# Patient Record
Sex: FEMALE | Race: WHITE | NOT HISPANIC OR LATINO | URBAN - METROPOLITAN AREA
[De-identification: names, ages, dates, MRNs, and addresses within clinical notes are randomized per-mention and may not be internally consistent; named-entity substitution may affect disease eponyms.]

---

## 2017-02-20 ENCOUNTER — EMERGENCY (EMERGENCY)
Facility: HOSPITAL | Age: 47
LOS: 1 days | End: 2017-02-20
Admitting: EMERGENCY MEDICINE
Payer: MEDICARE

## 2017-02-20 PROCEDURE — 72110 X-RAY EXAM L-2 SPINE 4/>VWS: CPT | Mod: 26

## 2017-02-20 PROCEDURE — 99284 EMERGENCY DEPT VISIT MOD MDM: CPT | Mod: 25

## 2017-02-20 PROCEDURE — 72110 X-RAY EXAM L-2 SPINE 4/>VWS: CPT

## 2017-02-20 PROCEDURE — 99283 EMERGENCY DEPT VISIT LOW MDM: CPT

## 2017-03-29 ENCOUNTER — TRANSCRIPTION ENCOUNTER (OUTPATIENT)
Age: 47
End: 2017-03-29

## 2017-03-30 PROBLEM — Z00.00 ENCOUNTER FOR PREVENTIVE HEALTH EXAMINATION: Status: ACTIVE | Noted: 2017-03-30

## 2017-04-06 ENCOUNTER — APPOINTMENT (OUTPATIENT)
Dept: INTERNAL MEDICINE | Facility: CLINIC | Age: 47
End: 2017-04-06

## 2017-04-06 VITALS
HEIGHT: 60 IN | DIASTOLIC BLOOD PRESSURE: 70 MMHG | RESPIRATION RATE: 14 BRPM | WEIGHT: 98 LBS | OXYGEN SATURATION: 98 % | BODY MASS INDEX: 19.24 KG/M2 | HEART RATE: 57 BPM | SYSTOLIC BLOOD PRESSURE: 110 MMHG

## 2017-04-06 RX ORDER — NAPROXEN 500 MG/1
500 TABLET ORAL
Qty: 30 | Refills: 0 | Status: ACTIVE | COMMUNITY
Start: 2017-02-20

## 2017-04-07 ENCOUNTER — MEDICATION RENEWAL (OUTPATIENT)
Age: 47
End: 2017-04-07

## 2017-04-07 LAB
25(OH)D3 SERPL-MCNC: 38.3 NG/ML
ALBUMIN SERPL ELPH-MCNC: 4.4 G/DL
ALP BLD-CCNC: 137 U/L
ALT SERPL-CCNC: 24 U/L
ANION GAP SERPL CALC-SCNC: 14 MMOL/L
AST SERPL-CCNC: 26 U/L
BASOPHILS # BLD AUTO: 0.02 K/UL
BASOPHILS NFR BLD AUTO: 0.6 %
BILIRUB SERPL-MCNC: 0.4 MG/DL
BUN SERPL-MCNC: 17 MG/DL
CALCIUM SERPL-MCNC: 8.8 MG/DL
CHLORIDE SERPL-SCNC: 91 MMOL/L
CHOLEST SERPL-MCNC: 160 MG/DL
CHOLEST/HDLC SERPL: 2.7 RATIO
CO2 SERPL-SCNC: 25 MMOL/L
CREAT SERPL-MCNC: 0.41 MG/DL
EOSINOPHIL # BLD AUTO: 0.01 K/UL
EOSINOPHIL NFR BLD AUTO: 0.3 %
FERRITIN SERPL-MCNC: 23.9 NG/ML
FOLATE SERPL-MCNC: 19.7 NG/ML
GLUCOSE SERPL-MCNC: 88 MG/DL
HBA1C MFR BLD HPLC: 4.1 %
HCT VFR BLD CALC: 26.2 %
HCV AB SER QL: NONREACTIVE
HCV S/CO RATIO: 0.07 S/CO
HDLC SERPL-MCNC: 59 MG/DL
HGB BLD-MCNC: 9.4 G/DL
IMM GRANULOCYTES NFR BLD AUTO: 0.3 %
IRON SATN MFR SERPL: 17 %
IRON SERPL-MCNC: 63 UG/DL
LDLC SERPL CALC-MCNC: 74 MG/DL
LYMPHOCYTES # BLD AUTO: 0.69 K/UL
LYMPHOCYTES NFR BLD AUTO: 19.4 %
MAN DIFF?: NORMAL
MCHC RBC-ENTMCNC: 35.9 GM/DL
MCHC RBC-ENTMCNC: 36 PG
MCV RBC AUTO: 100.4 FL
MONOCYTES # BLD AUTO: 0.39 K/UL
MONOCYTES NFR BLD AUTO: 11 %
NEUTROPHILS # BLD AUTO: 2.44 K/UL
NEUTROPHILS NFR BLD AUTO: 68.4 %
PLATELET # BLD AUTO: 205 K/UL
POTASSIUM SERPL-SCNC: 4.6 MMOL/L
PROT SERPL-MCNC: 6.2 G/DL
RBC # BLD: 2.61 M/UL
RBC # BLD: 2.61 M/UL
RBC # FLD: 12.8 %
RETICS # AUTO: 6 %
RETICS AGGREG/RBC NFR: 157.6 K/UL
SODIUM SERPL-SCNC: 130 MMOL/L
TIBC SERPL-MCNC: 364 UG/DL
TRIGL SERPL-MCNC: 136 MG/DL
TSH SERPL-ACNC: 3.47 UIU/ML
UIBC SERPL-MCNC: 301 UG/DL
URATE SERPL-MCNC: 2.3 MG/DL
VIT B12 SERPL-MCNC: 572 PG/ML
WBC # FLD AUTO: 3.56 K/UL

## 2017-04-07 RX ORDER — METAXALONE 800 MG/1
800 TABLET ORAL TWICE DAILY
Qty: 20 | Refills: 0 | Status: DISCONTINUED | COMMUNITY
Start: 2017-04-06 | End: 2017-04-07

## 2017-04-08 LAB — ERYTHROCYTE [SEDIMENTATION RATE] IN BLOOD BY WESTERGREN METHOD: 2 MM/HR

## 2017-04-28 ENCOUNTER — APPOINTMENT (OUTPATIENT)
Dept: INTERNAL MEDICINE | Facility: CLINIC | Age: 47
End: 2017-04-28

## 2017-04-28 VITALS
SYSTOLIC BLOOD PRESSURE: 120 MMHG | TEMPERATURE: 98.3 F | DIASTOLIC BLOOD PRESSURE: 70 MMHG | OXYGEN SATURATION: 98 % | RESPIRATION RATE: 14 BRPM | HEART RATE: 65 BPM | WEIGHT: 98 LBS | BODY MASS INDEX: 19.24 KG/M2 | HEIGHT: 60 IN

## 2017-05-10 RX ORDER — METHOCARBAMOL 500 MG/1
500 TABLET, FILM COATED ORAL
Qty: 20 | Refills: 0 | Status: DISCONTINUED | COMMUNITY
Start: 2017-04-07 | End: 2017-05-10

## 2017-05-11 ENCOUNTER — APPOINTMENT (OUTPATIENT)
Dept: INTERNAL MEDICINE | Facility: CLINIC | Age: 47
End: 2017-05-11

## 2017-05-11 VITALS
SYSTOLIC BLOOD PRESSURE: 130 MMHG | OXYGEN SATURATION: 98 % | DIASTOLIC BLOOD PRESSURE: 80 MMHG | RESPIRATION RATE: 14 BRPM | HEART RATE: 59 BPM | HEIGHT: 60 IN

## 2017-05-15 ENCOUNTER — LABORATORY RESULT (OUTPATIENT)
Age: 47
End: 2017-05-15

## 2017-05-17 RX ORDER — METHYLPREDNISOLONE 4 MG/1
4 TABLET ORAL
Qty: 21 | Refills: 0 | Status: DISCONTINUED | COMMUNITY
Start: 2017-02-27 | End: 2017-05-17

## 2017-06-01 ENCOUNTER — OUTPATIENT (OUTPATIENT)
Dept: OUTPATIENT SERVICES | Facility: HOSPITAL | Age: 47
LOS: 1 days | End: 2017-06-01
Payer: MEDICAID

## 2017-06-07 ENCOUNTER — MEDICATION RENEWAL (OUTPATIENT)
Age: 47
End: 2017-06-07

## 2017-06-07 RX ORDER — OXYCODONE 5 MG/1
5 TABLET ORAL TWICE DAILY
Qty: 10 | Refills: 0 | Status: ACTIVE | COMMUNITY
Start: 2017-06-07 | End: 1900-01-01

## 2017-06-13 DIAGNOSIS — R69 ILLNESS, UNSPECIFIED: ICD-10-CM

## 2017-06-15 ENCOUNTER — MEDICATION RENEWAL (OUTPATIENT)
Age: 47
End: 2017-06-15

## 2017-06-23 ENCOUNTER — MEDICATION RENEWAL (OUTPATIENT)
Age: 47
End: 2017-06-23

## 2017-06-23 RX ORDER — DOCUSATE SODIUM 100 MG/1
100 CAPSULE ORAL TWICE DAILY
Qty: 100 | Refills: 0 | Status: ACTIVE | COMMUNITY
Start: 2017-06-23 | End: 1900-01-01

## 2017-06-23 RX ORDER — OXYCODONE AND ACETAMINOPHEN 5; 325 MG/1; MG/1
5-325 TABLET ORAL 3 TIMES DAILY
Qty: 20 | Refills: 0 | Status: ACTIVE | COMMUNITY
Start: 2017-05-11 | End: 1900-01-01

## 2017-09-27 ENCOUNTER — APPOINTMENT (OUTPATIENT)
Dept: INTERNAL MEDICINE | Facility: CLINIC | Age: 47
End: 2017-09-27
Payer: MEDICARE

## 2017-09-27 VITALS
DIASTOLIC BLOOD PRESSURE: 80 MMHG | HEIGHT: 59 IN | WEIGHT: 83 LBS | SYSTOLIC BLOOD PRESSURE: 130 MMHG | BODY MASS INDEX: 16.73 KG/M2 | HEART RATE: 68 BPM

## 2017-09-27 DIAGNOSIS — Z23 ENCOUNTER FOR IMMUNIZATION: ICD-10-CM

## 2017-09-27 PROCEDURE — 99214 OFFICE O/P EST MOD 30 MIN: CPT

## 2017-09-28 ENCOUNTER — MED ADMIN CHARGE (OUTPATIENT)
Age: 47
End: 2017-09-28

## 2017-11-02 DIAGNOSIS — M54.9 DORSALGIA, UNSPECIFIED: ICD-10-CM

## 2017-12-28 ENCOUNTER — APPOINTMENT (OUTPATIENT)
Dept: RADIOLOGY | Facility: CLINIC | Age: 47
End: 2017-12-28

## 2018-01-11 ENCOUNTER — APPOINTMENT (OUTPATIENT)
Dept: RADIOLOGY | Facility: CLINIC | Age: 48
End: 2018-01-11

## 2018-01-12 ENCOUNTER — TRANSCRIPTION ENCOUNTER (OUTPATIENT)
Age: 48
End: 2018-01-12

## 2018-02-16 ENCOUNTER — MEDICATION RENEWAL (OUTPATIENT)
Age: 48
End: 2018-02-16

## 2018-02-22 ENCOUNTER — APPOINTMENT (OUTPATIENT)
Dept: INTERNAL MEDICINE | Facility: CLINIC | Age: 48
End: 2018-02-22

## 2018-03-14 ENCOUNTER — LABORATORY RESULT (OUTPATIENT)
Age: 48
End: 2018-03-14

## 2018-03-14 ENCOUNTER — APPOINTMENT (OUTPATIENT)
Dept: INTERNAL MEDICINE | Facility: CLINIC | Age: 48
End: 2018-03-14
Payer: MEDICARE

## 2018-03-14 VITALS
RESPIRATION RATE: 14 BRPM | DIASTOLIC BLOOD PRESSURE: 80 MMHG | WEIGHT: 80 LBS | HEART RATE: 55 BPM | HEIGHT: 59 IN | BODY MASS INDEX: 16.13 KG/M2 | SYSTOLIC BLOOD PRESSURE: 140 MMHG | OXYGEN SATURATION: 98 %

## 2018-03-14 DIAGNOSIS — K59.09 OTHER CONSTIPATION: ICD-10-CM

## 2018-03-14 PROCEDURE — 99214 OFFICE O/P EST MOD 30 MIN: CPT

## 2018-03-15 LAB
25(OH)D3 SERPL-MCNC: 43.5 NG/ML
ALBUMIN SERPL ELPH-MCNC: 4.5 G/DL
ALP BLD-CCNC: 72 U/L
ALT SERPL-CCNC: 80 U/L
ANION GAP SERPL CALC-SCNC: 12 MMOL/L
AST SERPL-CCNC: 59 U/L
BASOPHILS NFR BLD AUTO: 1 %
BILIRUB SERPL-MCNC: 0.5 MG/DL
BUN SERPL-MCNC: 20 MG/DL
CALCIUM SERPL-MCNC: 9.3 MG/DL
CALCIUM SERPL-MCNC: 9.3 MG/DL
CHLORIDE SERPL-SCNC: 92 MMOL/L
CHOLEST SERPL-MCNC: 146 MG/DL
CHOLEST/HDLC SERPL: 2.3 RATIO
CO2 SERPL-SCNC: 25 MMOL/L
CREAT SERPL-MCNC: 0.42 MG/DL
EOSINOPHIL NFR BLD AUTO: 0 %
FERRITIN SERPL-MCNC: 726 NG/ML
FOLATE SERPL-MCNC: >20 NG/ML
GLUCOSE SERPL-MCNC: 80 MG/DL
HBA1C MFR BLD HPLC: <4 %
HCT VFR BLD CALC: 25.9 %
HDLC SERPL-MCNC: 64 MG/DL
HGB BLD-MCNC: 8.7 G/DL
IRON SATN MFR SERPL: 48 %
IRON SERPL-MCNC: 111 UG/DL
LDLC SERPL CALC-MCNC: 61 MG/DL
LYMPHOCYTES # BLD AUTO: 0.57 K/UL
LYMPHOCYTES NFR BLD AUTO: 28.4 %
MAGNESIUM SERPL-MCNC: 1.9 MG/DL
MAN DIFF?: NORMAL
MCHC RBC-ENTMCNC: 33.6 GM/DL
MCHC RBC-ENTMCNC: 35.5 PG
MCV RBC AUTO: 105.7 FL
MONOCYTES NFR BLD AUTO: 9.8 %
NEUTROPHILS # BLD AUTO: 1.2 K/UL
NEUTROPHILS NFR BLD AUTO: 57.8 %
PARATHYROID HORMONE INTACT: 18 PG/ML
PHOSPHATE SERPL-MCNC: 3.6 MG/DL
PLATELET # BLD AUTO: 159 K/UL
POTASSIUM SERPL-SCNC: 5 MMOL/L
PROT SERPL-MCNC: 6.4 G/DL
RBC # BLD: 2.45 M/UL
RBC # BLD: 2.45 M/UL
RBC # FLD: 12.8 %
RETICS # AUTO: 4.6 %
RETICS AGGREG/RBC NFR: 112.9 K/UL
SODIUM SERPL-SCNC: 129 MMOL/L
TIBC SERPL-MCNC: 230 UG/DL
TRIGL SERPL-MCNC: 105 MG/DL
TSH SERPL-ACNC: 4.49 UIU/ML
UIBC SERPL-MCNC: 119 UG/DL
VIT B12 SERPL-MCNC: 925 PG/ML
WBC # FLD AUTO: 2.01 K/UL

## 2018-03-27 DIAGNOSIS — M25.551 PAIN IN RIGHT HIP: ICD-10-CM

## 2018-04-11 ENCOUNTER — RX RENEWAL (OUTPATIENT)
Age: 48
End: 2018-04-11

## 2018-08-01 ENCOUNTER — RX RENEWAL (OUTPATIENT)
Age: 48
End: 2018-08-01

## 2018-08-02 ENCOUNTER — RX RENEWAL (OUTPATIENT)
Age: 48
End: 2018-08-02

## 2018-08-03 ENCOUNTER — RX RENEWAL (OUTPATIENT)
Age: 48
End: 2018-08-03

## 2018-08-03 RX ORDER — LIDOCAINE 5% 700 MG/1
5 PATCH TOPICAL
Qty: 30 | Refills: 1 | Status: ACTIVE | COMMUNITY
Start: 2017-09-27 | End: 1900-01-01

## 2018-09-11 ENCOUNTER — TRANSCRIPTION ENCOUNTER (OUTPATIENT)
Age: 48
End: 2018-09-11

## 2018-09-19 ENCOUNTER — APPOINTMENT (OUTPATIENT)
Dept: INTERNAL MEDICINE | Facility: CLINIC | Age: 48
End: 2018-09-19
Payer: MEDICARE

## 2018-09-19 VITALS
DIASTOLIC BLOOD PRESSURE: 60 MMHG | HEIGHT: 59 IN | OXYGEN SATURATION: 98 % | RESPIRATION RATE: 14 BRPM | HEART RATE: 58 BPM | SYSTOLIC BLOOD PRESSURE: 110 MMHG | BODY MASS INDEX: 15.12 KG/M2 | WEIGHT: 75 LBS

## 2018-09-19 DIAGNOSIS — S22.000A WEDGE COMPRESSION FRACTURE OF UNSPECIFIED THORACIC VERTEBRA, INITIAL ENCOUNTER FOR CLOSED FRACTURE: ICD-10-CM

## 2018-09-19 PROCEDURE — 99214 OFFICE O/P EST MOD 30 MIN: CPT

## 2018-09-19 NOTE — HISTORY OF PRESENT ILLNESS
[FreeTextEntry8] : cc: wound infection\par \par Has an infection on back of left leg. Went to urgent care on 9/11/18 and was prescribed bactrim and something else. She went to [place] on 9/16/18. \par \par She is on forteo for her osteoporosis. She has severe back pain from compression fractures. She sees an osteopath at [facility] for gentil manipulation. Has an orthopedic appt tomorrow.

## 2018-09-19 NOTE — PHYSICAL EXAM
[No Acute Distress] : no acute distress [Cachectic] : cachexia was observed [No Respiratory Distress] : no respiratory distress  [Clear to Auscultation] : lungs were clear to auscultation bilaterally [Normal Rate] : normal rate  [Regular Rhythm] : with a regular rhythm [Normal Affect] : the affect was normal [Normal Insight/Judgement] : insight and judgment were intact [de-identified] : left leg with 2+ edema and right leg 1+ pitting [de-identified] : left posterior leg with scab at site of wound infection - no drainage

## 2018-10-08 ENCOUNTER — TRANSCRIPTION ENCOUNTER (OUTPATIENT)
Age: 48
End: 2018-10-08

## 2018-10-17 ENCOUNTER — TRANSCRIPTION ENCOUNTER (OUTPATIENT)
Age: 48
End: 2018-10-17

## 2019-05-16 ENCOUNTER — APPOINTMENT (OUTPATIENT)
Dept: INTERNAL MEDICINE | Facility: CLINIC | Age: 49
End: 2019-05-16
Payer: MEDICARE

## 2019-05-16 VITALS
WEIGHT: 75 LBS | HEART RATE: 62 BPM | RESPIRATION RATE: 14 BRPM | TEMPERATURE: 97.6 F | OXYGEN SATURATION: 99 % | BODY MASS INDEX: 15.12 KG/M2 | SYSTOLIC BLOOD PRESSURE: 120 MMHG | DIASTOLIC BLOOD PRESSURE: 80 MMHG | HEIGHT: 59 IN

## 2019-05-16 DIAGNOSIS — S32.000A WEDGE COMPRESSION FRACTURE OF UNSPECIFIED LUMBAR VERTEBRA, INITIAL ENCOUNTER FOR CLOSED FRACTURE: ICD-10-CM

## 2019-05-16 DIAGNOSIS — M81.0 AGE-RELATED OSTEOPOROSIS W/OUT CURRENT PATHOLOGICAL FRACTURE: ICD-10-CM

## 2019-05-16 DIAGNOSIS — D64.9 ANEMIA, UNSPECIFIED: ICD-10-CM

## 2019-05-16 PROCEDURE — 99214 OFFICE O/P EST MOD 30 MIN: CPT

## 2019-05-16 NOTE — HISTORY OF PRESENT ILLNESS
[FreeTextEntry1] : leg edema [de-identified] : Pt is here for swelling in her legs. She is having oozing fluid. \par She c/o loose stool which started after she took antibiotics.

## 2019-05-16 NOTE — PHYSICAL EXAM
[No Acute Distress] : no acute distress [Cachectic] : cachexia was observed [No Respiratory Distress] : no respiratory distress  [Clear to Auscultation] : lungs were clear to auscultation bilaterally [Regular Rhythm] : with a regular rhythm [Normal Rate] : normal rate  [de-identified] : 3+ edema legs R > L with oozing of fluid of left leg.

## 2019-05-24 ENCOUNTER — TRANSCRIPTION ENCOUNTER (OUTPATIENT)
Age: 49
End: 2019-05-24

## 2019-08-22 DIAGNOSIS — R60.0 LOCALIZED EDEMA: ICD-10-CM

## 2019-08-28 DIAGNOSIS — S81.802A UNSPECIFIED OPEN WOUND, LEFT LOWER LEG, INITIAL ENCOUNTER: ICD-10-CM

## 2019-08-28 RX ORDER — MUPIROCIN 20 MG/G
2 OINTMENT TOPICAL 3 TIMES DAILY
Qty: 1 | Refills: 0 | Status: ACTIVE | COMMUNITY
Start: 2019-08-28 | End: 1900-01-01

## 2020-09-21 ENCOUNTER — APPOINTMENT (OUTPATIENT)
Dept: OBGYN | Facility: CLINIC | Age: 50
End: 2020-09-21

## 2020-12-02 ENCOUNTER — APPOINTMENT (OUTPATIENT)
Dept: OBGYN | Facility: CLINIC | Age: 50
End: 2020-12-02

## 2023-01-01 ENCOUNTER — INPATIENT (INPATIENT)
Facility: HOSPITAL | Age: 53
LOS: 9 days | DRG: 808 | End: 2023-09-06
Attending: STUDENT IN AN ORGANIZED HEALTH CARE EDUCATION/TRAINING PROGRAM | Admitting: INTERNAL MEDICINE
Payer: MEDICARE

## 2023-01-01 ENCOUNTER — EMERGENCY (EMERGENCY)
Facility: HOSPITAL | Age: 53
LOS: 1 days | Discharge: LEFT BEFORE DISCHARGE | End: 2023-01-01
Attending: STUDENT IN AN ORGANIZED HEALTH CARE EDUCATION/TRAINING PROGRAM | Admitting: STUDENT IN AN ORGANIZED HEALTH CARE EDUCATION/TRAINING PROGRAM
Payer: MEDICARE

## 2023-01-01 VITALS
RESPIRATION RATE: 16 BRPM | TEMPERATURE: 97 F | SYSTOLIC BLOOD PRESSURE: 146 MMHG | HEIGHT: 55 IN | HEART RATE: 59 BPM | OXYGEN SATURATION: 99 % | WEIGHT: 59.97 LBS | DIASTOLIC BLOOD PRESSURE: 98 MMHG

## 2023-01-01 VITALS
TEMPERATURE: 98 F | RESPIRATION RATE: 20 BRPM | SYSTOLIC BLOOD PRESSURE: 125 MMHG | HEART RATE: 84 BPM | OXYGEN SATURATION: 100 % | DIASTOLIC BLOOD PRESSURE: 87 MMHG | HEIGHT: 55 IN | WEIGHT: 95.02 LBS

## 2023-01-01 DIAGNOSIS — R74.01 ELEVATION OF LEVELS OF LIVER TRANSAMINASE LEVELS: ICD-10-CM

## 2023-01-01 DIAGNOSIS — Z86.59 PERSONAL HISTORY OF OTHER MENTAL AND BEHAVIORAL DISORDERS: ICD-10-CM

## 2023-01-01 DIAGNOSIS — R62.7 ADULT FAILURE TO THRIVE: ICD-10-CM

## 2023-01-01 DIAGNOSIS — S81.802A UNSPECIFIED OPEN WOUND, LEFT LOWER LEG, INITIAL ENCOUNTER: ICD-10-CM

## 2023-01-01 DIAGNOSIS — R53.1 WEAKNESS: ICD-10-CM

## 2023-01-01 DIAGNOSIS — S62.102A FRACTURE OF UNSPECIFIED CARPAL BONE, LEFT WRIST, INITIAL ENCOUNTER FOR CLOSED FRACTURE: ICD-10-CM

## 2023-01-01 DIAGNOSIS — E87.8 OTHER DISORDERS OF ELECTROLYTE AND FLUID BALANCE, NOT ELSEWHERE CLASSIFIED: ICD-10-CM

## 2023-01-01 DIAGNOSIS — E87.1 HYPO-OSMOLALITY AND HYPONATREMIA: ICD-10-CM

## 2023-01-01 DIAGNOSIS — Z00.00 ENCOUNTER FOR GENERAL ADULT MEDICAL EXAMINATION WITHOUT ABNORMAL FINDINGS: ICD-10-CM

## 2023-01-01 DIAGNOSIS — E16.2 HYPOGLYCEMIA, UNSPECIFIED: ICD-10-CM

## 2023-01-01 DIAGNOSIS — E46 UNSPECIFIED PROTEIN-CALORIE MALNUTRITION: ICD-10-CM

## 2023-01-01 DIAGNOSIS — E03.9 HYPOTHYROIDISM, UNSPECIFIED: ICD-10-CM

## 2023-01-01 DIAGNOSIS — J96.01 ACUTE RESPIRATORY FAILURE WITH HYPOXIA: ICD-10-CM

## 2023-01-01 DIAGNOSIS — D61.818 OTHER PANCYTOPENIA: ICD-10-CM

## 2023-01-01 DIAGNOSIS — S81.801A UNSPECIFIED OPEN WOUND, RIGHT LOWER LEG, INITIAL ENCOUNTER: ICD-10-CM

## 2023-01-01 DIAGNOSIS — E87.6 HYPOKALEMIA: ICD-10-CM

## 2023-01-01 LAB
-  LEVOFLOXACIN: SIGNIFICANT CHANGE UP
-  MINOCYCLINE: SIGNIFICANT CHANGE UP
-  STREPTOCOCCUS SP. (NOT GRP A, B OR S PNEUMONIAE): SIGNIFICANT CHANGE UP
-  TRIMETHOPRIM/SULFAMETHOXAZOLE: SIGNIFICANT CHANGE UP
24R-OH-CALCIDIOL SERPL-MCNC: 46.6 NG/ML — SIGNIFICANT CHANGE UP (ref 30–80)
A1C WITH ESTIMATED AVERAGE GLUCOSE RESULT: 4.7 % — SIGNIFICANT CHANGE UP (ref 4–5.6)
ALBUMIN SERPL ELPH-MCNC: 1.5 G/DL — LOW (ref 3.3–5)
ALBUMIN SERPL ELPH-MCNC: 1.6 G/DL — LOW (ref 3.3–5)
ALBUMIN SERPL ELPH-MCNC: 1.6 G/DL — LOW (ref 3.3–5)
ALBUMIN SERPL ELPH-MCNC: 1.7 G/DL — LOW (ref 3.3–5)
ALBUMIN SERPL ELPH-MCNC: 1.8 G/DL — LOW (ref 3.3–5)
ALBUMIN SERPL ELPH-MCNC: 2 G/DL — LOW (ref 3.3–5)
ALBUMIN SERPL ELPH-MCNC: 2 G/DL — LOW (ref 3.3–5)
ALBUMIN SERPL ELPH-MCNC: 2.2 G/DL — LOW (ref 3.3–5)
ALBUMIN SERPL ELPH-MCNC: 2.5 G/DL — LOW (ref 3.3–5)
ALBUMIN SERPL ELPH-MCNC: 2.6 G/DL — LOW (ref 3.3–5)
ALBUMIN SERPL ELPH-MCNC: 2.6 G/DL — LOW (ref 3.3–5)
ALBUMIN SERPL ELPH-MCNC: 2.7 G/DL — LOW (ref 3.3–5)
ALBUMIN SERPL ELPH-MCNC: 2.9 G/DL — LOW (ref 3.3–5)
ALBUMIN SERPL ELPH-MCNC: 3.5 G/DL — SIGNIFICANT CHANGE UP (ref 3.3–5)
ALBUMIN SERPL ELPH-MCNC: 3.7 G/DL — SIGNIFICANT CHANGE UP (ref 3.3–5)
ALP SERPL-CCNC: 103 U/L — SIGNIFICANT CHANGE UP (ref 40–120)
ALP SERPL-CCNC: 105 U/L — SIGNIFICANT CHANGE UP (ref 40–120)
ALP SERPL-CCNC: 108 U/L — SIGNIFICANT CHANGE UP (ref 40–120)
ALP SERPL-CCNC: 109 U/L — SIGNIFICANT CHANGE UP (ref 40–120)
ALP SERPL-CCNC: 113 U/L — SIGNIFICANT CHANGE UP (ref 40–120)
ALP SERPL-CCNC: 135 U/L — HIGH (ref 40–120)
ALP SERPL-CCNC: 59 U/L — SIGNIFICANT CHANGE UP (ref 40–120)
ALP SERPL-CCNC: 59 U/L — SIGNIFICANT CHANGE UP (ref 40–120)
ALP SERPL-CCNC: 68 U/L — SIGNIFICANT CHANGE UP (ref 40–120)
ALP SERPL-CCNC: 76 U/L — SIGNIFICANT CHANGE UP (ref 40–120)
ALP SERPL-CCNC: 77 U/L — SIGNIFICANT CHANGE UP (ref 40–120)
ALP SERPL-CCNC: 81 U/L — SIGNIFICANT CHANGE UP (ref 40–120)
ALP SERPL-CCNC: 90 U/L — SIGNIFICANT CHANGE UP (ref 40–120)
ALP SERPL-CCNC: 91 U/L — SIGNIFICANT CHANGE UP (ref 40–120)
ALP SERPL-CCNC: 93 U/L — SIGNIFICANT CHANGE UP (ref 40–120)
ALP SERPL-CCNC: 94 U/L — SIGNIFICANT CHANGE UP (ref 40–120)
ALP SERPL-CCNC: 96 U/L — SIGNIFICANT CHANGE UP (ref 40–120)
ALT FLD-CCNC: 102 U/L — HIGH (ref 12–78)
ALT FLD-CCNC: 109 U/L — HIGH (ref 12–78)
ALT FLD-CCNC: 139 U/L — HIGH (ref 12–78)
ALT FLD-CCNC: 173 U/L — HIGH (ref 12–78)
ALT FLD-CCNC: 248 U/L — HIGH (ref 12–78)
ALT FLD-CCNC: 272 U/L — HIGH (ref 12–78)
ALT FLD-CCNC: 300 U/L — HIGH (ref 12–78)
ALT FLD-CCNC: 312 U/L — HIGH (ref 12–78)
ALT FLD-CCNC: 332 U/L — HIGH (ref 12–78)
ALT FLD-CCNC: 361 U/L — HIGH (ref 12–78)
ALT FLD-CCNC: 363 U/L — HIGH (ref 12–78)
ALT FLD-CCNC: 403 U/L — HIGH (ref 12–78)
ALT FLD-CCNC: 44 U/L — SIGNIFICANT CHANGE UP (ref 12–78)
ALT FLD-CCNC: 73 U/L — SIGNIFICANT CHANGE UP (ref 12–78)
ALT FLD-CCNC: 82 U/L — HIGH (ref 12–78)
ALT FLD-CCNC: 90 U/L — HIGH (ref 12–78)
ALT FLD-CCNC: 93 U/L — HIGH (ref 12–78)
AMMONIA BLD-MCNC: 44 UMOL/L — HIGH (ref 11–32)
ANION GAP SERPL CALC-SCNC: 1 MMOL/L — LOW (ref 5–17)
ANION GAP SERPL CALC-SCNC: 10 MMOL/L — SIGNIFICANT CHANGE UP (ref 5–17)
ANION GAP SERPL CALC-SCNC: 10 MMOL/L — SIGNIFICANT CHANGE UP (ref 5–17)
ANION GAP SERPL CALC-SCNC: 2 MMOL/L — LOW (ref 5–17)
ANION GAP SERPL CALC-SCNC: 2 MMOL/L — LOW (ref 5–17)
ANION GAP SERPL CALC-SCNC: 3 MMOL/L — LOW (ref 5–17)
ANION GAP SERPL CALC-SCNC: 4 MMOL/L — LOW (ref 5–17)
ANION GAP SERPL CALC-SCNC: 5 MMOL/L — SIGNIFICANT CHANGE UP (ref 5–17)
ANION GAP SERPL CALC-SCNC: 6 MMOL/L — SIGNIFICANT CHANGE UP (ref 5–17)
ANION GAP SERPL CALC-SCNC: 7 MMOL/L — SIGNIFICANT CHANGE UP (ref 5–17)
ANION GAP SERPL CALC-SCNC: 7 MMOL/L — SIGNIFICANT CHANGE UP (ref 5–17)
ANION GAP SERPL CALC-SCNC: 9 MMOL/L — SIGNIFICANT CHANGE UP (ref 5–17)
APAP SERPL-MCNC: 12 UG/ML — SIGNIFICANT CHANGE UP (ref 10–30)
APPEARANCE UR: CLEAR — SIGNIFICANT CHANGE UP
APTT BLD: 28.2 SEC — SIGNIFICANT CHANGE UP (ref 24.5–35.6)
APTT BLD: 29.1 SEC — SIGNIFICANT CHANGE UP (ref 24.5–35.6)
APTT BLD: 31.3 SEC — SIGNIFICANT CHANGE UP (ref 24.5–35.6)
APTT BLD: 41.6 SEC — HIGH (ref 24.5–35.6)
APTT BLD: 43.2 SEC — HIGH (ref 24.5–35.6)
AST SERPL-CCNC: 121 U/L — HIGH (ref 15–37)
AST SERPL-CCNC: 142 U/L — HIGH (ref 15–37)
AST SERPL-CCNC: 182 U/L — HIGH (ref 15–37)
AST SERPL-CCNC: 234 U/L — HIGH (ref 15–37)
AST SERPL-CCNC: 321 U/L — HIGH (ref 15–37)
AST SERPL-CCNC: 335 U/L — HIGH (ref 15–37)
AST SERPL-CCNC: 35 U/L — SIGNIFICANT CHANGE UP (ref 15–37)
AST SERPL-CCNC: 355 U/L — HIGH (ref 15–37)
AST SERPL-CCNC: 40 U/L — HIGH (ref 15–37)
AST SERPL-CCNC: 427 U/L — HIGH (ref 15–37)
AST SERPL-CCNC: 446 U/L — HIGH (ref 15–37)
AST SERPL-CCNC: 45 U/L — HIGH (ref 15–37)
AST SERPL-CCNC: 45 U/L — HIGH (ref 15–37)
AST SERPL-CCNC: 506 U/L — HIGH (ref 15–37)
AST SERPL-CCNC: 58 U/L — HIGH (ref 15–37)
AST SERPL-CCNC: 67 U/L — HIGH (ref 15–37)
AST SERPL-CCNC: 753 U/L — HIGH (ref 15–37)
BASE EXCESS BLDA CALC-SCNC: -0.5 MMOL/L — SIGNIFICANT CHANGE UP (ref -2–3)
BASE EXCESS BLDA CALC-SCNC: -1.1 MMOL/L — SIGNIFICANT CHANGE UP (ref -2–3)
BASE EXCESS BLDA CALC-SCNC: -10.1 MMOL/L — LOW (ref -2–3)
BASE EXCESS BLDA CALC-SCNC: -7 MMOL/L — LOW (ref -2–3)
BASE EXCESS BLDA CALC-SCNC: -7.6 MMOL/L — LOW (ref -2–3)
BASE EXCESS BLDA CALC-SCNC: -9.3 MMOL/L — LOW (ref -2–3)
BASE EXCESS BLDA CALC-SCNC: -9.8 MMOL/L — LOW (ref -2–3)
BASE EXCESS BLDA CALC-SCNC: 2.6 MMOL/L — SIGNIFICANT CHANGE UP (ref -2–3)
BASOPHILS # BLD AUTO: 0 K/UL — SIGNIFICANT CHANGE UP (ref 0–0.2)
BASOPHILS # BLD AUTO: 0.02 K/UL — SIGNIFICANT CHANGE UP (ref 0–0.2)
BASOPHILS NFR BLD AUTO: 0 % — SIGNIFICANT CHANGE UP (ref 0–2)
BASOPHILS NFR BLD AUTO: 0.9 % — SIGNIFICANT CHANGE UP (ref 0–2)
BILIRUB SERPL-MCNC: 0.3 MG/DL — SIGNIFICANT CHANGE UP (ref 0.2–1.2)
BILIRUB SERPL-MCNC: 0.5 MG/DL — SIGNIFICANT CHANGE UP (ref 0.2–1.2)
BILIRUB SERPL-MCNC: 0.5 MG/DL — SIGNIFICANT CHANGE UP (ref 0.2–1.2)
BILIRUB SERPL-MCNC: 0.7 MG/DL — SIGNIFICANT CHANGE UP (ref 0.2–1.2)
BILIRUB SERPL-MCNC: 0.9 MG/DL — SIGNIFICANT CHANGE UP (ref 0.2–1.2)
BILIRUB SERPL-MCNC: 0.9 MG/DL — SIGNIFICANT CHANGE UP (ref 0.2–1.2)
BILIRUB SERPL-MCNC: 1.4 MG/DL — HIGH (ref 0.2–1.2)
BILIRUB SERPL-MCNC: 1.5 MG/DL — HIGH (ref 0.2–1.2)
BILIRUB SERPL-MCNC: 1.5 MG/DL — HIGH (ref 0.2–1.2)
BILIRUB SERPL-MCNC: 1.6 MG/DL — HIGH (ref 0.2–1.2)
BILIRUB SERPL-MCNC: 1.7 MG/DL — HIGH (ref 0.2–1.2)
BILIRUB SERPL-MCNC: 1.8 MG/DL — HIGH (ref 0.2–1.2)
BILIRUB SERPL-MCNC: 1.9 MG/DL — HIGH (ref 0.2–1.2)
BILIRUB UR-MCNC: NEGATIVE — SIGNIFICANT CHANGE UP
BLOOD GAS COMMENTS ARTERIAL: SIGNIFICANT CHANGE UP
BUN SERPL-MCNC: 26 MG/DL — HIGH (ref 7–23)
BUN SERPL-MCNC: 27 MG/DL — HIGH (ref 7–23)
BUN SERPL-MCNC: 28 MG/DL — HIGH (ref 7–23)
BUN SERPL-MCNC: 28 MG/DL — HIGH (ref 7–23)
BUN SERPL-MCNC: 29 MG/DL — HIGH (ref 7–23)
BUN SERPL-MCNC: 34 MG/DL — HIGH (ref 7–23)
BUN SERPL-MCNC: 35 MG/DL — HIGH (ref 7–23)
BUN SERPL-MCNC: 37 MG/DL — HIGH (ref 7–23)
BUN SERPL-MCNC: 38 MG/DL — HIGH (ref 7–23)
BUN SERPL-MCNC: 39 MG/DL — HIGH (ref 7–23)
BUN SERPL-MCNC: 44 MG/DL — HIGH (ref 7–23)
BUN SERPL-MCNC: 44 MG/DL — HIGH (ref 7–23)
BUN SERPL-MCNC: 46 MG/DL — HIGH (ref 7–23)
BUN SERPL-MCNC: 48 MG/DL — HIGH (ref 7–23)
BUN SERPL-MCNC: 48 MG/DL — HIGH (ref 7–23)
BUN SERPL-MCNC: 49 MG/DL — HIGH (ref 7–23)
BUN SERPL-MCNC: 50 MG/DL — HIGH (ref 7–23)
BUN SERPL-MCNC: 50 MG/DL — HIGH (ref 7–23)
BUN SERPL-MCNC: 53 MG/DL — HIGH (ref 7–23)
BUN SERPL-MCNC: 54 MG/DL — HIGH (ref 7–23)
BUN SERPL-MCNC: 55 MG/DL — HIGH (ref 7–23)
BUN SERPL-MCNC: 59 MG/DL — HIGH (ref 7–23)
BUN SERPL-MCNC: 60 MG/DL — HIGH (ref 7–23)
BUN SERPL-MCNC: 67 MG/DL — HIGH (ref 7–23)
CALCIUM SERPL-MCNC: 5.7 MG/DL — CRITICAL LOW (ref 8.5–10.1)
CALCIUM SERPL-MCNC: 5.7 MG/DL — CRITICAL LOW (ref 8.5–10.1)
CALCIUM SERPL-MCNC: 5.8 MG/DL — CRITICAL LOW (ref 8.5–10.1)
CALCIUM SERPL-MCNC: 5.9 MG/DL — CRITICAL LOW (ref 8.5–10.1)
CALCIUM SERPL-MCNC: 6 MG/DL — CRITICAL LOW (ref 8.5–10.1)
CALCIUM SERPL-MCNC: 6.1 MG/DL — CRITICAL LOW (ref 8.5–10.1)
CALCIUM SERPL-MCNC: 6.2 MG/DL — CRITICAL LOW (ref 8.5–10.1)
CALCIUM SERPL-MCNC: 6.2 MG/DL — CRITICAL LOW (ref 8.5–10.1)
CALCIUM SERPL-MCNC: 6.3 MG/DL — CRITICAL LOW (ref 8.5–10.1)
CALCIUM SERPL-MCNC: 6.4 MG/DL — CRITICAL LOW (ref 8.5–10.1)
CALCIUM SERPL-MCNC: 6.5 MG/DL — CRITICAL LOW (ref 8.5–10.1)
CALCIUM SERPL-MCNC: 6.6 MG/DL — LOW (ref 8.5–10.1)
CALCIUM SERPL-MCNC: 6.7 MG/DL — LOW (ref 8.4–10.5)
CALCIUM SERPL-MCNC: 6.7 MG/DL — LOW (ref 8.5–10.1)
CALCIUM SERPL-MCNC: 6.7 MG/DL — LOW (ref 8.5–10.1)
CALCIUM SERPL-MCNC: 6.9 MG/DL — LOW (ref 8.5–10.1)
CALCIUM SERPL-MCNC: 7 MG/DL — LOW (ref 8.5–10.1)
CALCIUM SERPL-MCNC: 7.1 MG/DL — LOW (ref 8.5–10.1)
CALCIUM SERPL-MCNC: 7.2 MG/DL — LOW (ref 8.5–10.1)
CALCIUM SERPL-MCNC: 7.2 MG/DL — LOW (ref 8.5–10.1)
CALCIUM SERPL-MCNC: 7.8 MG/DL — LOW (ref 8.5–10.1)
CHLORIDE SERPL-SCNC: 100 MMOL/L — SIGNIFICANT CHANGE UP (ref 96–108)
CHLORIDE SERPL-SCNC: 100 MMOL/L — SIGNIFICANT CHANGE UP (ref 96–108)
CHLORIDE SERPL-SCNC: 105 MMOL/L — SIGNIFICANT CHANGE UP (ref 96–108)
CHLORIDE SERPL-SCNC: 109 MMOL/L — HIGH (ref 96–108)
CHLORIDE SERPL-SCNC: 111 MMOL/L — HIGH (ref 96–108)
CHLORIDE SERPL-SCNC: 112 MMOL/L — HIGH (ref 96–108)
CHLORIDE SERPL-SCNC: 113 MMOL/L — HIGH (ref 96–108)
CHLORIDE SERPL-SCNC: 114 MMOL/L — HIGH (ref 96–108)
CHLORIDE SERPL-SCNC: 115 MMOL/L — HIGH (ref 96–108)
CHLORIDE SERPL-SCNC: 117 MMOL/L — HIGH (ref 96–108)
CHLORIDE SERPL-SCNC: 119 MMOL/L — HIGH (ref 96–108)
CHLORIDE SERPL-SCNC: 120 MMOL/L — HIGH (ref 96–108)
CHLORIDE SERPL-SCNC: 121 MMOL/L — HIGH (ref 96–108)
CHLORIDE SERPL-SCNC: 121 MMOL/L — HIGH (ref 96–108)
CHLORIDE SERPL-SCNC: 122 MMOL/L — HIGH (ref 96–108)
CHLORIDE SERPL-SCNC: 123 MMOL/L — HIGH (ref 96–108)
CHLORIDE SERPL-SCNC: 98 MMOL/L — SIGNIFICANT CHANGE UP (ref 96–108)
CHOLEST SERPL-MCNC: 82 MG/DL — SIGNIFICANT CHANGE UP
CK MB BLD-MCNC: 4.8 % — HIGH (ref 0–3.5)
CK MB CFR SERPL CALC: 15.4 NG/ML — HIGH (ref 0–3.6)
CK SERPL-CCNC: 167 U/L — SIGNIFICANT CHANGE UP (ref 26–192)
CK SERPL-CCNC: 320 U/L — HIGH (ref 26–192)
CO2 SERPL-SCNC: 16 MMOL/L — LOW (ref 22–31)
CO2 SERPL-SCNC: 19 MMOL/L — LOW (ref 22–31)
CO2 SERPL-SCNC: 20 MMOL/L — LOW (ref 22–31)
CO2 SERPL-SCNC: 21 MMOL/L — LOW (ref 22–31)
CO2 SERPL-SCNC: 22 MMOL/L — SIGNIFICANT CHANGE UP (ref 22–31)
CO2 SERPL-SCNC: 23 MMOL/L — SIGNIFICANT CHANGE UP (ref 22–31)
CO2 SERPL-SCNC: 24 MMOL/L — SIGNIFICANT CHANGE UP (ref 22–31)
CO2 SERPL-SCNC: 25 MMOL/L — SIGNIFICANT CHANGE UP (ref 22–31)
COLOR SPEC: YELLOW — SIGNIFICANT CHANGE UP
CORTIS AM PEAK SERPL-MCNC: 24.5 UG/DL — HIGH (ref 6–18.4)
CREAT ?TM UR-MCNC: 8 MG/DL — SIGNIFICANT CHANGE UP
CREAT SERPL-MCNC: 0.36 MG/DL — LOW (ref 0.5–1.3)
CREAT SERPL-MCNC: 0.38 MG/DL — LOW (ref 0.5–1.3)
CREAT SERPL-MCNC: 0.38 MG/DL — LOW (ref 0.5–1.3)
CREAT SERPL-MCNC: 0.39 MG/DL — LOW (ref 0.5–1.3)
CREAT SERPL-MCNC: 0.4 MG/DL — LOW (ref 0.5–1.3)
CREAT SERPL-MCNC: 0.41 MG/DL — LOW (ref 0.5–1.3)
CREAT SERPL-MCNC: 0.44 MG/DL — LOW (ref 0.5–1.3)
CREAT SERPL-MCNC: 0.47 MG/DL — LOW (ref 0.5–1.3)
CREAT SERPL-MCNC: 0.47 MG/DL — LOW (ref 0.5–1.3)
CREAT SERPL-MCNC: 0.52 MG/DL — SIGNIFICANT CHANGE UP (ref 0.5–1.3)
CREAT SERPL-MCNC: 0.53 MG/DL — SIGNIFICANT CHANGE UP (ref 0.5–1.3)
CREAT SERPL-MCNC: 0.65 MG/DL — SIGNIFICANT CHANGE UP (ref 0.5–1.3)
CREAT SERPL-MCNC: 0.66 MG/DL — SIGNIFICANT CHANGE UP (ref 0.5–1.3)
CREAT SERPL-MCNC: 0.66 MG/DL — SIGNIFICANT CHANGE UP (ref 0.5–1.3)
CREAT SERPL-MCNC: 0.68 MG/DL — SIGNIFICANT CHANGE UP (ref 0.5–1.3)
CREAT SERPL-MCNC: 0.69 MG/DL — SIGNIFICANT CHANGE UP (ref 0.5–1.3)
CREAT SERPL-MCNC: 0.69 MG/DL — SIGNIFICANT CHANGE UP (ref 0.5–1.3)
CREAT SERPL-MCNC: 0.7 MG/DL — SIGNIFICANT CHANGE UP (ref 0.5–1.3)
CREAT SERPL-MCNC: 0.71 MG/DL — SIGNIFICANT CHANGE UP (ref 0.5–1.3)
CREAT SERPL-MCNC: 0.72 MG/DL — SIGNIFICANT CHANGE UP (ref 0.5–1.3)
CREAT SERPL-MCNC: 0.72 MG/DL — SIGNIFICANT CHANGE UP (ref 0.5–1.3)
CREAT SERPL-MCNC: 0.73 MG/DL — SIGNIFICANT CHANGE UP (ref 0.5–1.3)
CREAT SERPL-MCNC: 0.74 MG/DL — SIGNIFICANT CHANGE UP (ref 0.5–1.3)
CREAT SERPL-MCNC: 0.78 MG/DL — SIGNIFICANT CHANGE UP (ref 0.5–1.3)
CREAT SERPL-MCNC: 0.82 MG/DL — SIGNIFICANT CHANGE UP (ref 0.5–1.3)
CREAT SERPL-MCNC: 0.86 MG/DL — SIGNIFICANT CHANGE UP (ref 0.5–1.3)
CREAT SERPL-MCNC: 0.93 MG/DL — SIGNIFICANT CHANGE UP (ref 0.5–1.3)
CREAT SERPL-MCNC: 1 MG/DL — SIGNIFICANT CHANGE UP (ref 0.5–1.3)
CULTURE RESULTS: SIGNIFICANT CHANGE UP
DIFF PNL FLD: NEGATIVE — SIGNIFICANT CHANGE UP
EGFR: 101 ML/MIN/1.73M2 — SIGNIFICANT CHANGE UP
EGFR: 103 ML/MIN/1.73M2 — SIGNIFICANT CHANGE UP
EGFR: 104 ML/MIN/1.73M2 — SIGNIFICANT CHANGE UP
EGFR: 104 ML/MIN/1.73M2 — SIGNIFICANT CHANGE UP
EGFR: 105 ML/MIN/1.73M2 — SIGNIFICANT CHANGE UP
EGFR: 110 ML/MIN/1.73M2 — SIGNIFICANT CHANGE UP
EGFR: 110 ML/MIN/1.73M2 — SIGNIFICANT CHANGE UP
EGFR: 113 ML/MIN/1.73M2 — SIGNIFICANT CHANGE UP
EGFR: 113 ML/MIN/1.73M2 — SIGNIFICANT CHANGE UP
EGFR: 115 ML/MIN/1.73M2 — SIGNIFICANT CHANGE UP
EGFR: 117 ML/MIN/1.73M2 — SIGNIFICANT CHANGE UP
EGFR: 118 ML/MIN/1.73M2 — SIGNIFICANT CHANGE UP
EGFR: 118 ML/MIN/1.73M2 — SIGNIFICANT CHANGE UP
EGFR: 119 ML/MIN/1.73M2 — SIGNIFICANT CHANGE UP
EGFR: 119 ML/MIN/1.73M2 — SIGNIFICANT CHANGE UP
EGFR: 121 ML/MIN/1.73M2 — SIGNIFICANT CHANGE UP
EGFR: 67 ML/MIN/1.73M2 — SIGNIFICANT CHANGE UP
EGFR: 73 ML/MIN/1.73M2 — SIGNIFICANT CHANGE UP
EGFR: 80 ML/MIN/1.73M2 — SIGNIFICANT CHANGE UP
EGFR: 85 ML/MIN/1.73M2 — SIGNIFICANT CHANGE UP
EGFR: 90 ML/MIN/1.73M2 — SIGNIFICANT CHANGE UP
EGFR: 96 ML/MIN/1.73M2 — SIGNIFICANT CHANGE UP
EGFR: 98 ML/MIN/1.73M2 — SIGNIFICANT CHANGE UP
EGFR: 99 ML/MIN/1.73M2 — SIGNIFICANT CHANGE UP
EGFR: 99 ML/MIN/1.73M2 — SIGNIFICANT CHANGE UP
EOSINOPHIL # BLD AUTO: 0 K/UL — SIGNIFICANT CHANGE UP (ref 0–0.5)
EOSINOPHIL # BLD AUTO: 0.01 K/UL — SIGNIFICANT CHANGE UP (ref 0–0.5)
EOSINOPHIL NFR BLD AUTO: 0 % — SIGNIFICANT CHANGE UP (ref 0–6)
EOSINOPHIL NFR BLD AUTO: 0.6 % — SIGNIFICANT CHANGE UP (ref 0–6)
ESTIMATED AVERAGE GLUCOSE: 88 MG/DL — SIGNIFICANT CHANGE UP (ref 68–114)
ETHANOL SERPL-MCNC: 12 MG/DL — HIGH (ref 0–10)
FERRITIN SERPL-MCNC: 430 NG/ML — HIGH (ref 13–330)
FOLATE SERPL-MCNC: >20 NG/ML — SIGNIFICANT CHANGE UP
GAS PNL BLDA: SIGNIFICANT CHANGE UP
GLUCOSE SERPL-MCNC: 106 MG/DL — HIGH (ref 70–99)
GLUCOSE SERPL-MCNC: 1104 MG/DL — CRITICAL HIGH (ref 70–99)
GLUCOSE SERPL-MCNC: 117 MG/DL — HIGH (ref 70–99)
GLUCOSE SERPL-MCNC: 123 MG/DL — HIGH (ref 70–99)
GLUCOSE SERPL-MCNC: 139 MG/DL — HIGH (ref 70–99)
GLUCOSE SERPL-MCNC: 143 MG/DL — HIGH (ref 70–99)
GLUCOSE SERPL-MCNC: 145 MG/DL — HIGH (ref 70–99)
GLUCOSE SERPL-MCNC: 146 MG/DL — HIGH (ref 70–99)
GLUCOSE SERPL-MCNC: 152 MG/DL — HIGH (ref 70–99)
GLUCOSE SERPL-MCNC: 155 MG/DL — HIGH (ref 70–99)
GLUCOSE SERPL-MCNC: 159 MG/DL — HIGH (ref 70–99)
GLUCOSE SERPL-MCNC: 168 MG/DL — HIGH (ref 70–99)
GLUCOSE SERPL-MCNC: 171 MG/DL — HIGH (ref 70–99)
GLUCOSE SERPL-MCNC: 179 MG/DL — HIGH (ref 70–99)
GLUCOSE SERPL-MCNC: 186 MG/DL — HIGH (ref 70–99)
GLUCOSE SERPL-MCNC: 193 MG/DL — HIGH (ref 70–99)
GLUCOSE SERPL-MCNC: 193 MG/DL — HIGH (ref 70–99)
GLUCOSE SERPL-MCNC: 195 MG/DL — HIGH (ref 70–99)
GLUCOSE SERPL-MCNC: 208 MG/DL — HIGH (ref 70–99)
GLUCOSE SERPL-MCNC: 209 MG/DL — HIGH (ref 70–99)
GLUCOSE SERPL-MCNC: 209 MG/DL — HIGH (ref 70–99)
GLUCOSE SERPL-MCNC: 213 MG/DL — HIGH (ref 70–99)
GLUCOSE SERPL-MCNC: 228 MG/DL — HIGH (ref 70–99)
GLUCOSE SERPL-MCNC: 313 MG/DL — HIGH (ref 70–99)
GLUCOSE SERPL-MCNC: 448 MG/DL — HIGH (ref 70–99)
GLUCOSE SERPL-MCNC: 61 MG/DL — LOW (ref 70–99)
GLUCOSE SERPL-MCNC: 770 MG/DL — CRITICAL HIGH (ref 70–99)
GLUCOSE SERPL-MCNC: 82 MG/DL — SIGNIFICANT CHANGE UP (ref 70–99)
GLUCOSE UR QL: 100 MG/DL
GRAM STN FLD: SIGNIFICANT CHANGE UP
HCG SERPL-ACNC: <1 MIU/ML — SIGNIFICANT CHANGE UP
HCO3 BLDA-SCNC: 16 MMOL/L — LOW (ref 21–28)
HCO3 BLDA-SCNC: 17 MMOL/L — LOW (ref 21–28)
HCO3 BLDA-SCNC: 18 MMOL/L — LOW (ref 21–28)
HCO3 BLDA-SCNC: 20 MMOL/L — LOW (ref 21–28)
HCO3 BLDA-SCNC: 21 MMOL/L — SIGNIFICANT CHANGE UP (ref 21–28)
HCO3 BLDA-SCNC: 22 MMOL/L — SIGNIFICANT CHANGE UP (ref 21–28)
HCO3 BLDA-SCNC: 25 MMOL/L — SIGNIFICANT CHANGE UP (ref 21–28)
HCO3 BLDA-SCNC: 26 MMOL/L — SIGNIFICANT CHANGE UP (ref 21–28)
HCT VFR BLD CALC: 18.7 % — CRITICAL LOW (ref 34.5–45)
HCT VFR BLD CALC: 19.1 % — CRITICAL LOW (ref 34.5–45)
HCT VFR BLD CALC: 19.4 % — CRITICAL LOW (ref 34.5–45)
HCT VFR BLD CALC: 19.9 % — CRITICAL LOW (ref 34.5–45)
HCT VFR BLD CALC: 20.2 % — CRITICAL LOW (ref 34.5–45)
HCT VFR BLD CALC: 20.8 % — CRITICAL LOW (ref 34.5–45)
HCT VFR BLD CALC: 24.4 % — LOW (ref 34.5–45)
HCT VFR BLD CALC: 25.2 % — LOW (ref 34.5–45)
HCT VFR BLD CALC: 25.8 % — LOW (ref 34.5–45)
HCT VFR BLD CALC: 26.2 % — LOW (ref 34.5–45)
HCT VFR BLD CALC: 27.1 % — LOW (ref 34.5–45)
HCT VFR BLD CALC: 28 % — LOW (ref 34.5–45)
HCT VFR BLD CALC: 28.3 % — LOW (ref 34.5–45)
HCT VFR BLD CALC: 29.3 % — LOW (ref 34.5–45)
HCT VFR BLD CALC: 29.8 % — LOW (ref 34.5–45)
HCT VFR BLD CALC: 30.3 % — LOW (ref 34.5–45)
HCT VFR BLD CALC: 30.7 % — LOW (ref 34.5–45)
HCT VFR BLD CALC: 31.6 % — LOW (ref 34.5–45)
HCT VFR BLD CALC: 32.3 % — LOW (ref 34.5–45)
HDLC SERPL-MCNC: 74 MG/DL — SIGNIFICANT CHANGE UP
HGB BLD-MCNC: 10.1 G/DL — LOW (ref 11.5–15.5)
HGB BLD-MCNC: 10.2 G/DL — LOW (ref 11.5–15.5)
HGB BLD-MCNC: 10.4 G/DL — LOW (ref 11.5–15.5)
HGB BLD-MCNC: 10.4 G/DL — LOW (ref 11.5–15.5)
HGB BLD-MCNC: 10.6 G/DL — LOW (ref 11.5–15.5)
HGB BLD-MCNC: 11.9 G/DL — SIGNIFICANT CHANGE UP (ref 11.5–15.5)
HGB BLD-MCNC: 6.7 G/DL — CRITICAL LOW (ref 11.5–15.5)
HGB BLD-MCNC: 6.9 G/DL — CRITICAL LOW (ref 11.5–15.5)
HGB BLD-MCNC: 6.9 G/DL — CRITICAL LOW (ref 11.5–15.5)
HGB BLD-MCNC: 7 G/DL — CRITICAL LOW (ref 11.5–15.5)
HGB BLD-MCNC: 7.4 G/DL — LOW (ref 11.5–15.5)
HGB BLD-MCNC: 7.4 G/DL — LOW (ref 11.5–15.5)
HGB BLD-MCNC: 7.8 G/DL — LOW (ref 11.5–15.5)
HGB BLD-MCNC: 8.3 G/DL — LOW (ref 11.5–15.5)
HGB BLD-MCNC: 8.8 G/DL — LOW (ref 11.5–15.5)
HGB BLD-MCNC: 8.9 G/DL — LOW (ref 11.5–15.5)
HGB BLD-MCNC: 9.1 G/DL — LOW (ref 11.5–15.5)
HGB BLD-MCNC: 9.2 G/DL — LOW (ref 11.5–15.5)
HGB BLD-MCNC: 9.8 G/DL — LOW (ref 11.5–15.5)
HOROWITZ INDEX BLDA+IHG-RTO: 100 — SIGNIFICANT CHANGE UP
HOROWITZ INDEX BLDA+IHG-RTO: 100 — SIGNIFICANT CHANGE UP
HOROWITZ INDEX BLDA+IHG-RTO: 30 — SIGNIFICANT CHANGE UP
HOROWITZ INDEX BLDA+IHG-RTO: 30 — SIGNIFICANT CHANGE UP
HOROWITZ INDEX BLDA+IHG-RTO: 32 — SIGNIFICANT CHANGE UP
HOROWITZ INDEX BLDA+IHG-RTO: 80 — SIGNIFICANT CHANGE UP
IMM GRANULOCYTES NFR BLD AUTO: 0 % — SIGNIFICANT CHANGE UP (ref 0–0.9)
IMM GRANULOCYTES NFR BLD AUTO: 0.9 % — SIGNIFICANT CHANGE UP (ref 0–0.9)
IMM GRANULOCYTES NFR BLD AUTO: 1.8 % — HIGH (ref 0–0.9)
IMM GRANULOCYTES NFR BLD AUTO: 1.9 % — HIGH (ref 0–0.9)
IMM GRANULOCYTES NFR BLD AUTO: 4.6 % — HIGH (ref 0–0.9)
IMM GRANULOCYTES NFR BLD AUTO: 4.8 % — HIGH (ref 0–0.9)
IMM GRANULOCYTES NFR BLD AUTO: 7.8 % — HIGH (ref 0–0.9)
INR BLD: 0.89 RATIO — SIGNIFICANT CHANGE UP (ref 0.85–1.18)
INR BLD: 0.9 RATIO — SIGNIFICANT CHANGE UP (ref 0.85–1.18)
INR BLD: 1.03 RATIO — SIGNIFICANT CHANGE UP (ref 0.85–1.18)
INR BLD: 1.27 RATIO — HIGH (ref 0.85–1.18)
INR BLD: 1.36 RATIO — HIGH (ref 0.85–1.18)
IRON SATN MFR SERPL: 41 % — SIGNIFICANT CHANGE UP (ref 14–50)
IRON SATN MFR SERPL: 97 UG/DL — SIGNIFICANT CHANGE UP (ref 30–160)
KETONES UR-MCNC: NEGATIVE MG/DL — SIGNIFICANT CHANGE UP
LACTATE SERPL-SCNC: 0.6 MMOL/L — LOW (ref 0.7–2)
LACTATE SERPL-SCNC: 1 MMOL/L — SIGNIFICANT CHANGE UP (ref 0.7–2)
LACTATE SERPL-SCNC: 1.2 MMOL/L — SIGNIFICANT CHANGE UP (ref 0.7–2)
LACTATE SERPL-SCNC: 1.9 MMOL/L — SIGNIFICANT CHANGE UP (ref 0.7–2)
LEUKOCYTE ESTERASE UR-ACNC: ABNORMAL
LIDOCAIN IGE QN: 69 U/L — SIGNIFICANT CHANGE UP (ref 13–75)
LIPID PNL WITH DIRECT LDL SERPL: SIGNIFICANT CHANGE UP MG/DL
LYMPHOCYTES # BLD AUTO: 0.03 K/UL — LOW (ref 1–3.3)
LYMPHOCYTES # BLD AUTO: 0.03 K/UL — LOW (ref 1–3.3)
LYMPHOCYTES # BLD AUTO: 0.04 K/UL — LOW (ref 1–3.3)
LYMPHOCYTES # BLD AUTO: 0.04 K/UL — LOW (ref 1–3.3)
LYMPHOCYTES # BLD AUTO: 0.15 K/UL — LOW (ref 1–3.3)
LYMPHOCYTES # BLD AUTO: 0.18 K/UL — LOW (ref 1–3.3)
LYMPHOCYTES # BLD AUTO: 0.25 K/UL — LOW (ref 1–3.3)
LYMPHOCYTES # BLD AUTO: 0.26 K/UL — LOW (ref 1–3.3)
LYMPHOCYTES # BLD AUTO: 0.27 K/UL — LOW (ref 1–3.3)
LYMPHOCYTES # BLD AUTO: 0.28 K/UL — LOW (ref 1–3.3)
LYMPHOCYTES # BLD AUTO: 0.28 K/UL — LOW (ref 1–3.3)
LYMPHOCYTES # BLD AUTO: 0.34 K/UL — LOW (ref 1–3.3)
LYMPHOCYTES # BLD AUTO: 0.42 K/UL — LOW (ref 1–3.3)
LYMPHOCYTES # BLD AUTO: 1.9 % — LOW (ref 13–44)
LYMPHOCYTES # BLD AUTO: 11 % — LOW (ref 13–44)
LYMPHOCYTES # BLD AUTO: 12 % — LOW (ref 13–44)
LYMPHOCYTES # BLD AUTO: 12.6 % — LOW (ref 13–44)
LYMPHOCYTES # BLD AUTO: 13 % — SIGNIFICANT CHANGE UP (ref 13–44)
LYMPHOCYTES # BLD AUTO: 18.5 % — SIGNIFICANT CHANGE UP (ref 13–44)
LYMPHOCYTES # BLD AUTO: 20.9 % — SIGNIFICANT CHANGE UP (ref 13–44)
LYMPHOCYTES # BLD AUTO: 3 % — LOW (ref 13–44)
LYMPHOCYTES # BLD AUTO: 3 % — LOW (ref 13–44)
LYMPHOCYTES # BLD AUTO: 4.3 % — LOW (ref 13–44)
LYMPHOCYTES # BLD AUTO: 4.6 % — LOW (ref 13–44)
LYMPHOCYTES # BLD AUTO: 7.4 % — LOW (ref 13–44)
LYMPHOCYTES # BLD AUTO: 8 % — LOW (ref 13–44)
MAGNESIUM SERPL-MCNC: 1.7 MG/DL — SIGNIFICANT CHANGE UP (ref 1.6–2.6)
MAGNESIUM SERPL-MCNC: 1.7 MG/DL — SIGNIFICANT CHANGE UP (ref 1.6–2.6)
MAGNESIUM SERPL-MCNC: 1.8 MG/DL — SIGNIFICANT CHANGE UP (ref 1.6–2.6)
MAGNESIUM SERPL-MCNC: 1.9 MG/DL — SIGNIFICANT CHANGE UP (ref 1.6–2.6)
MAGNESIUM SERPL-MCNC: 2 MG/DL — SIGNIFICANT CHANGE UP (ref 1.6–2.6)
MAGNESIUM SERPL-MCNC: 2.1 MG/DL — SIGNIFICANT CHANGE UP (ref 1.6–2.6)
MAGNESIUM SERPL-MCNC: 2.1 MG/DL — SIGNIFICANT CHANGE UP (ref 1.6–2.6)
MAGNESIUM SERPL-MCNC: 2.2 MG/DL — SIGNIFICANT CHANGE UP (ref 1.6–2.6)
MAGNESIUM SERPL-MCNC: 2.3 MG/DL — SIGNIFICANT CHANGE UP (ref 1.6–2.6)
MAGNESIUM SERPL-MCNC: 2.5 MG/DL — SIGNIFICANT CHANGE UP (ref 1.6–2.6)
MCHC RBC-ENTMCNC: 29.3 PG — SIGNIFICANT CHANGE UP (ref 27–34)
MCHC RBC-ENTMCNC: 29.5 PG — SIGNIFICANT CHANGE UP (ref 27–34)
MCHC RBC-ENTMCNC: 29.6 PG — SIGNIFICANT CHANGE UP (ref 27–34)
MCHC RBC-ENTMCNC: 29.8 PG — SIGNIFICANT CHANGE UP (ref 27–34)
MCHC RBC-ENTMCNC: 30 PG — SIGNIFICANT CHANGE UP (ref 27–34)
MCHC RBC-ENTMCNC: 30.3 PG — SIGNIFICANT CHANGE UP (ref 27–34)
MCHC RBC-ENTMCNC: 31 GM/DL — LOW (ref 32–36)
MCHC RBC-ENTMCNC: 31 PG — SIGNIFICANT CHANGE UP (ref 27–34)
MCHC RBC-ENTMCNC: 31.4 GM/DL — LOW (ref 32–36)
MCHC RBC-ENTMCNC: 31.6 PG — SIGNIFICANT CHANGE UP (ref 27–34)
MCHC RBC-ENTMCNC: 31.7 PG — SIGNIFICANT CHANGE UP (ref 27–34)
MCHC RBC-ENTMCNC: 32 PG — SIGNIFICANT CHANGE UP (ref 27–34)
MCHC RBC-ENTMCNC: 32.5 GM/DL — SIGNIFICANT CHANGE UP (ref 32–36)
MCHC RBC-ENTMCNC: 33.5 GM/DL — SIGNIFICANT CHANGE UP (ref 32–36)
MCHC RBC-ENTMCNC: 33.7 GM/DL — SIGNIFICANT CHANGE UP (ref 32–36)
MCHC RBC-ENTMCNC: 33.7 PG — SIGNIFICANT CHANGE UP (ref 27–34)
MCHC RBC-ENTMCNC: 33.9 GM/DL — SIGNIFICANT CHANGE UP (ref 32–36)
MCHC RBC-ENTMCNC: 33.9 GM/DL — SIGNIFICANT CHANGE UP (ref 32–36)
MCHC RBC-ENTMCNC: 33.9 PG — SIGNIFICANT CHANGE UP (ref 27–34)
MCHC RBC-ENTMCNC: 34 GM/DL — SIGNIFICANT CHANGE UP (ref 32–36)
MCHC RBC-ENTMCNC: 34 GM/DL — SIGNIFICANT CHANGE UP (ref 32–36)
MCHC RBC-ENTMCNC: 34.1 PG — HIGH (ref 27–34)
MCHC RBC-ENTMCNC: 34.2 PG — HIGH (ref 27–34)
MCHC RBC-ENTMCNC: 34.3 PG — HIGH (ref 27–34)
MCHC RBC-ENTMCNC: 34.5 PG — HIGH (ref 27–34)
MCHC RBC-ENTMCNC: 34.7 GM/DL — SIGNIFICANT CHANGE UP (ref 32–36)
MCHC RBC-ENTMCNC: 35 PG — HIGH (ref 27–34)
MCHC RBC-ENTMCNC: 35.1 GM/DL — SIGNIFICANT CHANGE UP (ref 32–36)
MCHC RBC-ENTMCNC: 35.1 PG — HIGH (ref 27–34)
MCHC RBC-ENTMCNC: 35.3 GM/DL — SIGNIFICANT CHANGE UP (ref 32–36)
MCHC RBC-ENTMCNC: 35.5 GM/DL — SIGNIFICANT CHANGE UP (ref 32–36)
MCHC RBC-ENTMCNC: 35.6 GM/DL — SIGNIFICANT CHANGE UP (ref 32–36)
MCHC RBC-ENTMCNC: 36.1 GM/DL — HIGH (ref 32–36)
MCHC RBC-ENTMCNC: 36.2 GM/DL — HIGH (ref 32–36)
MCHC RBC-ENTMCNC: 36.6 GM/DL — HIGH (ref 32–36)
MCHC RBC-ENTMCNC: 36.8 GM/DL — HIGH (ref 32–36)
MCV RBC AUTO: 88.3 FL — SIGNIFICANT CHANGE UP (ref 80–100)
MCV RBC AUTO: 89.1 FL — SIGNIFICANT CHANGE UP (ref 80–100)
MCV RBC AUTO: 89.1 FL — SIGNIFICANT CHANGE UP (ref 80–100)
MCV RBC AUTO: 90 FL — SIGNIFICANT CHANGE UP (ref 80–100)
MCV RBC AUTO: 91.3 FL — SIGNIFICANT CHANGE UP (ref 80–100)
MCV RBC AUTO: 91.4 FL — SIGNIFICANT CHANGE UP (ref 80–100)
MCV RBC AUTO: 91.6 FL — SIGNIFICANT CHANGE UP (ref 80–100)
MCV RBC AUTO: 93.1 FL — SIGNIFICANT CHANGE UP (ref 80–100)
MCV RBC AUTO: 93.3 FL — SIGNIFICANT CHANGE UP (ref 80–100)
MCV RBC AUTO: 93.3 FL — SIGNIFICANT CHANGE UP (ref 80–100)
MCV RBC AUTO: 94.4 FL — SIGNIFICANT CHANGE UP (ref 80–100)
MCV RBC AUTO: 95.4 FL — SIGNIFICANT CHANGE UP (ref 80–100)
MCV RBC AUTO: 95.5 FL — SIGNIFICANT CHANGE UP (ref 80–100)
MCV RBC AUTO: 95.7 FL — SIGNIFICANT CHANGE UP (ref 80–100)
MCV RBC AUTO: 97 FL — SIGNIFICANT CHANGE UP (ref 80–100)
MCV RBC AUTO: 97 FL — SIGNIFICANT CHANGE UP (ref 80–100)
MCV RBC AUTO: 98.5 FL — SIGNIFICANT CHANGE UP (ref 80–100)
MCV RBC AUTO: 99.2 FL — SIGNIFICANT CHANGE UP (ref 80–100)
METHOD TYPE: SIGNIFICANT CHANGE UP
MONOCYTES # BLD AUTO: 0 K/UL — SIGNIFICANT CHANGE UP (ref 0–0.9)
MONOCYTES # BLD AUTO: 0.03 K/UL — SIGNIFICANT CHANGE UP (ref 0–0.9)
MONOCYTES # BLD AUTO: 0.03 K/UL — SIGNIFICANT CHANGE UP (ref 0–0.9)
MONOCYTES # BLD AUTO: 0.04 K/UL — SIGNIFICANT CHANGE UP (ref 0–0.9)
MONOCYTES # BLD AUTO: 0.04 K/UL — SIGNIFICANT CHANGE UP (ref 0–0.9)
MONOCYTES # BLD AUTO: 0.07 K/UL — SIGNIFICANT CHANGE UP (ref 0–0.9)
MONOCYTES # BLD AUTO: 0.07 K/UL — SIGNIFICANT CHANGE UP (ref 0–0.9)
MONOCYTES # BLD AUTO: 0.1 K/UL — SIGNIFICANT CHANGE UP (ref 0–0.9)
MONOCYTES # BLD AUTO: 0.1 K/UL — SIGNIFICANT CHANGE UP (ref 0–0.9)
MONOCYTES # BLD AUTO: 0.12 K/UL — SIGNIFICANT CHANGE UP (ref 0–0.9)
MONOCYTES # BLD AUTO: 0.13 K/UL — SIGNIFICANT CHANGE UP (ref 0–0.9)
MONOCYTES # BLD AUTO: 0.13 K/UL — SIGNIFICANT CHANGE UP (ref 0–0.9)
MONOCYTES # BLD AUTO: 0.18 K/UL — SIGNIFICANT CHANGE UP (ref 0–0.9)
MONOCYTES NFR BLD AUTO: 0 % — LOW (ref 2–14)
MONOCYTES NFR BLD AUTO: 1.5 % — LOW (ref 2–14)
MONOCYTES NFR BLD AUTO: 2.5 % — SIGNIFICANT CHANGE UP (ref 2–14)
MONOCYTES NFR BLD AUTO: 3 % — SIGNIFICANT CHANGE UP (ref 2–14)
MONOCYTES NFR BLD AUTO: 3.1 % — SIGNIFICANT CHANGE UP (ref 2–14)
MONOCYTES NFR BLD AUTO: 4 % — SIGNIFICANT CHANGE UP (ref 2–14)
MONOCYTES NFR BLD AUTO: 4 % — SIGNIFICANT CHANGE UP (ref 2–14)
MONOCYTES NFR BLD AUTO: 5 % — SIGNIFICANT CHANGE UP (ref 2–14)
MONOCYTES NFR BLD AUTO: 5.6 % — SIGNIFICANT CHANGE UP (ref 2–14)
MONOCYTES NFR BLD AUTO: 5.6 % — SIGNIFICANT CHANGE UP (ref 2–14)
MONOCYTES NFR BLD AUTO: 6 % — SIGNIFICANT CHANGE UP (ref 2–14)
MRSA PCR RESULT.: SIGNIFICANT CHANGE UP
NEUTROPHILS # BLD AUTO: 0.66 K/UL — LOW (ref 1.8–7.4)
NEUTROPHILS # BLD AUTO: 1.09 K/UL — LOW (ref 1.8–7.4)
NEUTROPHILS # BLD AUTO: 1.21 K/UL — LOW (ref 1.8–7.4)
NEUTROPHILS # BLD AUTO: 1.23 K/UL — LOW (ref 1.8–7.4)
NEUTROPHILS # BLD AUTO: 1.38 K/UL — LOW (ref 1.8–7.4)
NEUTROPHILS # BLD AUTO: 1.61 K/UL — LOW (ref 1.8–7.4)
NEUTROPHILS # BLD AUTO: 1.67 K/UL — LOW (ref 1.8–7.4)
NEUTROPHILS # BLD AUTO: 1.76 K/UL — LOW (ref 1.8–7.4)
NEUTROPHILS # BLD AUTO: 1.94 K/UL — SIGNIFICANT CHANGE UP (ref 1.8–7.4)
NEUTROPHILS # BLD AUTO: 2.1 K/UL — SIGNIFICANT CHANGE UP (ref 1.8–7.4)
NEUTROPHILS # BLD AUTO: 2.76 K/UL — SIGNIFICANT CHANGE UP (ref 1.8–7.4)
NEUTROPHILS # BLD AUTO: 2.92 K/UL — SIGNIFICANT CHANGE UP (ref 1.8–7.4)
NEUTROPHILS # BLD AUTO: 3 K/UL — SIGNIFICANT CHANGE UP (ref 1.8–7.4)
NEUTROPHILS NFR BLD AUTO: 73.6 % — SIGNIFICANT CHANGE UP (ref 43–77)
NEUTROPHILS NFR BLD AUTO: 74.2 % — SIGNIFICANT CHANGE UP (ref 43–77)
NEUTROPHILS NFR BLD AUTO: 78 % — HIGH (ref 43–77)
NEUTROPHILS NFR BLD AUTO: 78.1 % — HIGH (ref 43–77)
NEUTROPHILS NFR BLD AUTO: 81 % — HIGH (ref 43–77)
NEUTROPHILS NFR BLD AUTO: 84 % — HIGH (ref 43–77)
NEUTROPHILS NFR BLD AUTO: 85 % — HIGH (ref 43–77)
NEUTROPHILS NFR BLD AUTO: 85.2 % — HIGH (ref 43–77)
NEUTROPHILS NFR BLD AUTO: 87 % — HIGH (ref 43–77)
NEUTROPHILS NFR BLD AUTO: 88.7 % — HIGH (ref 43–77)
NEUTROPHILS NFR BLD AUTO: 89.7 % — HIGH (ref 43–77)
NEUTROPHILS NFR BLD AUTO: 94 % — HIGH (ref 43–77)
NEUTROPHILS NFR BLD AUTO: 95.7 % — HIGH (ref 43–77)
NITRITE UR-MCNC: NEGATIVE — SIGNIFICANT CHANGE UP
NON HDL CHOLESTEROL: 8 MG/DL — SIGNIFICANT CHANGE UP
NRBC # BLD: 0 /100 WBCS — SIGNIFICANT CHANGE UP (ref 0–0)
NRBC # BLD: SIGNIFICANT CHANGE UP /100 WBCS (ref 0–0)
NT-PROBNP SERPL-SCNC: 3558 PG/ML — HIGH (ref 0–125)
ORGANISM # SPEC MICROSCOPIC CNT: SIGNIFICANT CHANGE UP
OSMOLALITY UR: 399 MOSM/KG — SIGNIFICANT CHANGE UP (ref 50–1200)
PCO2 BLDA: 25 MMHG — LOW (ref 32–35)
PCO2 BLDA: 31 MMHG — LOW (ref 32–35)
PCO2 BLDA: 33 MMHG — SIGNIFICANT CHANGE UP (ref 32–35)
PCO2 BLDA: 34 MMHG — SIGNIFICANT CHANGE UP (ref 32–35)
PCO2 BLDA: 36 MMHG — HIGH (ref 32–35)
PCO2 BLDA: 42 MMHG — HIGH (ref 32–35)
PCO2 BLDA: 44 MMHG — HIGH (ref 32–35)
PCO2 BLDA: 84 MMHG — CRITICAL HIGH (ref 32–35)
PCP SPEC-MCNC: SIGNIFICANT CHANGE UP
PH BLDA: 7.02 — CRITICAL LOW (ref 7.35–7.45)
PH BLDA: 7.28 — LOW (ref 7.35–7.45)
PH BLDA: 7.28 — LOW (ref 7.35–7.45)
PH BLDA: 7.32 — LOW (ref 7.35–7.45)
PH BLDA: 7.35 — SIGNIFICANT CHANGE UP (ref 7.35–7.45)
PH BLDA: 7.36 — SIGNIFICANT CHANGE UP (ref 7.35–7.45)
PH BLDA: 7.49 — HIGH (ref 7.35–7.45)
PH BLDA: 7.54 — HIGH (ref 7.35–7.45)
PH UR: 7.5 — SIGNIFICANT CHANGE UP (ref 5–8)
PHOSPHATE SERPL-MCNC: 12.4 MG/DL — HIGH (ref 2.5–4.5)
PHOSPHATE SERPL-MCNC: 2.1 MG/DL — LOW (ref 2.5–4.5)
PHOSPHATE SERPL-MCNC: 2.2 MG/DL — LOW (ref 2.5–4.5)
PHOSPHATE SERPL-MCNC: 2.3 MG/DL — LOW (ref 2.5–4.5)
PHOSPHATE SERPL-MCNC: 2.4 MG/DL — LOW (ref 2.5–4.5)
PHOSPHATE SERPL-MCNC: 2.6 MG/DL — SIGNIFICANT CHANGE UP (ref 2.5–4.5)
PHOSPHATE SERPL-MCNC: 2.6 MG/DL — SIGNIFICANT CHANGE UP (ref 2.5–4.5)
PHOSPHATE SERPL-MCNC: 2.7 MG/DL — SIGNIFICANT CHANGE UP (ref 2.5–4.5)
PHOSPHATE SERPL-MCNC: 2.7 MG/DL — SIGNIFICANT CHANGE UP (ref 2.5–4.5)
PHOSPHATE SERPL-MCNC: 3 MG/DL — SIGNIFICANT CHANGE UP (ref 2.5–4.5)
PHOSPHATE SERPL-MCNC: 3.1 MG/DL — SIGNIFICANT CHANGE UP (ref 2.5–4.5)
PHOSPHATE SERPL-MCNC: 3.3 MG/DL — SIGNIFICANT CHANGE UP (ref 2.5–4.5)
PHOSPHATE SERPL-MCNC: 3.5 MG/DL — SIGNIFICANT CHANGE UP (ref 2.5–4.5)
PHOSPHATE SERPL-MCNC: 3.7 MG/DL — SIGNIFICANT CHANGE UP (ref 2.5–4.5)
PHOSPHATE SERPL-MCNC: 3.9 MG/DL — SIGNIFICANT CHANGE UP (ref 2.5–4.5)
PHOSPHATE SERPL-MCNC: 3.9 MG/DL — SIGNIFICANT CHANGE UP (ref 2.5–4.5)
PHOSPHATE SERPL-MCNC: 4 MG/DL — SIGNIFICANT CHANGE UP (ref 2.5–4.5)
PHOSPHATE SERPL-MCNC: 4.3 MG/DL — SIGNIFICANT CHANGE UP (ref 2.5–4.5)
PHOSPHATE SERPL-MCNC: 4.3 MG/DL — SIGNIFICANT CHANGE UP (ref 2.5–4.5)
PHOSPHATE SERPL-MCNC: 4.6 MG/DL — HIGH (ref 2.5–4.5)
PHOSPHATE SERPL-MCNC: 4.9 MG/DL — HIGH (ref 2.5–4.5)
PHOSPHATE SERPL-MCNC: 5.4 MG/DL — HIGH (ref 2.5–4.5)
PHOSPHATE SERPL-MCNC: 5.4 MG/DL — HIGH (ref 2.5–4.5)
PHOSPHATE SERPL-MCNC: 5.6 MG/DL — HIGH (ref 2.5–4.5)
PLATELET # BLD AUTO: 100 K/UL — LOW (ref 150–400)
PLATELET # BLD AUTO: 112 K/UL — LOW (ref 150–400)
PLATELET # BLD AUTO: 12 K/UL — CRITICAL LOW (ref 150–400)
PLATELET # BLD AUTO: 123 K/UL — LOW (ref 150–400)
PLATELET # BLD AUTO: 15 K/UL — CRITICAL LOW (ref 150–400)
PLATELET # BLD AUTO: 157 K/UL — SIGNIFICANT CHANGE UP (ref 150–400)
PLATELET # BLD AUTO: 21 K/UL — LOW (ref 150–400)
PLATELET # BLD AUTO: 30 K/UL — LOW (ref 150–400)
PLATELET # BLD AUTO: 35 K/UL — LOW (ref 150–400)
PLATELET # BLD AUTO: 39 K/UL — LOW (ref 150–400)
PLATELET # BLD AUTO: 45 K/UL — LOW (ref 150–400)
PLATELET # BLD AUTO: 54 K/UL — LOW (ref 150–400)
PLATELET # BLD AUTO: 60 K/UL — LOW (ref 150–400)
PLATELET # BLD AUTO: 61 K/UL — LOW (ref 150–400)
PLATELET # BLD AUTO: 64 K/UL — LOW (ref 150–400)
PLATELET # BLD AUTO: 68 K/UL — LOW (ref 150–400)
PLATELET # BLD AUTO: 89 K/UL — LOW (ref 150–400)
PLATELET # BLD AUTO: 90 K/UL — LOW (ref 150–400)
PO2 BLDA: 139 MMHG — HIGH (ref 83–108)
PO2 BLDA: 155 MMHG — HIGH (ref 83–108)
PO2 BLDA: 268 MMHG — HIGH (ref 83–108)
PO2 BLDA: 317 MMHG — HIGH (ref 83–108)
PO2 BLDA: 46 MMHG — CRITICAL LOW (ref 83–108)
PO2 BLDA: 62 MMHG — LOW (ref 83–108)
PO2 BLDA: 75 MMHG — LOW (ref 83–108)
PO2 BLDA: 97 MMHG — SIGNIFICANT CHANGE UP (ref 83–108)
POTASSIUM SERPL-MCNC: 2.7 MMOL/L — CRITICAL LOW (ref 3.5–5.3)
POTASSIUM SERPL-MCNC: 2.9 MMOL/L — CRITICAL LOW (ref 3.5–5.3)
POTASSIUM SERPL-MCNC: 3.3 MMOL/L — LOW (ref 3.5–5.3)
POTASSIUM SERPL-MCNC: 3.3 MMOL/L — LOW (ref 3.5–5.3)
POTASSIUM SERPL-MCNC: 3.4 MMOL/L — LOW (ref 3.5–5.3)
POTASSIUM SERPL-MCNC: 3.5 MMOL/L — SIGNIFICANT CHANGE UP (ref 3.5–5.3)
POTASSIUM SERPL-MCNC: 3.6 MMOL/L — SIGNIFICANT CHANGE UP (ref 3.5–5.3)
POTASSIUM SERPL-MCNC: 3.7 MMOL/L — SIGNIFICANT CHANGE UP (ref 3.5–5.3)
POTASSIUM SERPL-MCNC: 3.9 MMOL/L — SIGNIFICANT CHANGE UP (ref 3.5–5.3)
POTASSIUM SERPL-MCNC: 3.9 MMOL/L — SIGNIFICANT CHANGE UP (ref 3.5–5.3)
POTASSIUM SERPL-MCNC: 4 MMOL/L — SIGNIFICANT CHANGE UP (ref 3.5–5.3)
POTASSIUM SERPL-MCNC: 4.1 MMOL/L — SIGNIFICANT CHANGE UP (ref 3.5–5.3)
POTASSIUM SERPL-MCNC: 4.2 MMOL/L — SIGNIFICANT CHANGE UP (ref 3.5–5.3)
POTASSIUM SERPL-MCNC: 4.2 MMOL/L — SIGNIFICANT CHANGE UP (ref 3.5–5.3)
POTASSIUM SERPL-MCNC: 4.3 MMOL/L — SIGNIFICANT CHANGE UP (ref 3.5–5.3)
POTASSIUM SERPL-MCNC: 4.3 MMOL/L — SIGNIFICANT CHANGE UP (ref 3.5–5.3)
POTASSIUM SERPL-MCNC: 4.4 MMOL/L — SIGNIFICANT CHANGE UP (ref 3.5–5.3)
POTASSIUM SERPL-MCNC: 4.5 MMOL/L — SIGNIFICANT CHANGE UP (ref 3.5–5.3)
POTASSIUM SERPL-MCNC: 4.9 MMOL/L — SIGNIFICANT CHANGE UP (ref 3.5–5.3)
POTASSIUM SERPL-MCNC: 5 MMOL/L — SIGNIFICANT CHANGE UP (ref 3.5–5.3)
POTASSIUM SERPL-MCNC: 5.2 MMOL/L — SIGNIFICANT CHANGE UP (ref 3.5–5.3)
POTASSIUM SERPL-MCNC: 5.3 MMOL/L — SIGNIFICANT CHANGE UP (ref 3.5–5.3)
POTASSIUM SERPL-MCNC: 5.5 MMOL/L — HIGH (ref 3.5–5.3)
POTASSIUM SERPL-MCNC: 5.6 MMOL/L — HIGH (ref 3.5–5.3)
POTASSIUM SERPL-MCNC: 5.7 MMOL/L — HIGH (ref 3.5–5.3)
POTASSIUM SERPL-MCNC: 9.4 MMOL/L — CRITICAL HIGH (ref 3.5–5.3)
POTASSIUM SERPL-SCNC: 2.7 MMOL/L — CRITICAL LOW (ref 3.5–5.3)
POTASSIUM SERPL-SCNC: 2.9 MMOL/L — CRITICAL LOW (ref 3.5–5.3)
POTASSIUM SERPL-SCNC: 3.3 MMOL/L — LOW (ref 3.5–5.3)
POTASSIUM SERPL-SCNC: 3.3 MMOL/L — LOW (ref 3.5–5.3)
POTASSIUM SERPL-SCNC: 3.4 MMOL/L — LOW (ref 3.5–5.3)
POTASSIUM SERPL-SCNC: 3.5 MMOL/L — SIGNIFICANT CHANGE UP (ref 3.5–5.3)
POTASSIUM SERPL-SCNC: 3.6 MMOL/L — SIGNIFICANT CHANGE UP (ref 3.5–5.3)
POTASSIUM SERPL-SCNC: 3.7 MMOL/L — SIGNIFICANT CHANGE UP (ref 3.5–5.3)
POTASSIUM SERPL-SCNC: 3.9 MMOL/L — SIGNIFICANT CHANGE UP (ref 3.5–5.3)
POTASSIUM SERPL-SCNC: 3.9 MMOL/L — SIGNIFICANT CHANGE UP (ref 3.5–5.3)
POTASSIUM SERPL-SCNC: 4 MMOL/L — SIGNIFICANT CHANGE UP (ref 3.5–5.3)
POTASSIUM SERPL-SCNC: 4.1 MMOL/L — SIGNIFICANT CHANGE UP (ref 3.5–5.3)
POTASSIUM SERPL-SCNC: 4.2 MMOL/L — SIGNIFICANT CHANGE UP (ref 3.5–5.3)
POTASSIUM SERPL-SCNC: 4.2 MMOL/L — SIGNIFICANT CHANGE UP (ref 3.5–5.3)
POTASSIUM SERPL-SCNC: 4.3 MMOL/L — SIGNIFICANT CHANGE UP (ref 3.5–5.3)
POTASSIUM SERPL-SCNC: 4.3 MMOL/L — SIGNIFICANT CHANGE UP (ref 3.5–5.3)
POTASSIUM SERPL-SCNC: 4.4 MMOL/L — SIGNIFICANT CHANGE UP (ref 3.5–5.3)
POTASSIUM SERPL-SCNC: 4.5 MMOL/L — SIGNIFICANT CHANGE UP (ref 3.5–5.3)
POTASSIUM SERPL-SCNC: 4.9 MMOL/L — SIGNIFICANT CHANGE UP (ref 3.5–5.3)
POTASSIUM SERPL-SCNC: 5 MMOL/L — SIGNIFICANT CHANGE UP (ref 3.5–5.3)
POTASSIUM SERPL-SCNC: 5.2 MMOL/L — SIGNIFICANT CHANGE UP (ref 3.5–5.3)
POTASSIUM SERPL-SCNC: 5.3 MMOL/L — SIGNIFICANT CHANGE UP (ref 3.5–5.3)
POTASSIUM SERPL-SCNC: 5.5 MMOL/L — HIGH (ref 3.5–5.3)
POTASSIUM SERPL-SCNC: 5.6 MMOL/L — HIGH (ref 3.5–5.3)
POTASSIUM SERPL-SCNC: 5.7 MMOL/L — HIGH (ref 3.5–5.3)
POTASSIUM SERPL-SCNC: 9.4 MMOL/L — CRITICAL HIGH (ref 3.5–5.3)
POTASSIUM UR-SCNC: 42.9 MMOL/L — SIGNIFICANT CHANGE UP
PROT SERPL-MCNC: 3.9 G/DL — LOW (ref 6–8.3)
PROT SERPL-MCNC: 4 G/DL — LOW (ref 6–8.3)
PROT SERPL-MCNC: 4.1 G/DL — LOW (ref 6–8.3)
PROT SERPL-MCNC: 4.1 G/DL — LOW (ref 6–8.3)
PROT SERPL-MCNC: 4.3 G/DL — LOW (ref 6–8.3)
PROT SERPL-MCNC: 4.3 G/DL — LOW (ref 6–8.3)
PROT SERPL-MCNC: 4.4 G/DL — LOW (ref 6–8.3)
PROT SERPL-MCNC: 4.4 G/DL — LOW (ref 6–8.3)
PROT SERPL-MCNC: 4.5 G/DL — LOW (ref 6–8.3)
PROT SERPL-MCNC: 4.5 G/DL — LOW (ref 6–8.3)
PROT SERPL-MCNC: 4.6 G/DL — LOW (ref 6–8.3)
PROT SERPL-MCNC: 4.7 G/DL — LOW (ref 6–8.3)
PROT SERPL-MCNC: 4.7 G/DL — LOW (ref 6–8.3)
PROT SERPL-MCNC: 4.8 G/DL — LOW (ref 6–8.3)
PROT SERPL-MCNC: 5.3 G/DL — LOW (ref 6–8.3)
PROT SERPL-MCNC: 6.1 G/DL — SIGNIFICANT CHANGE UP (ref 6–8.3)
PROT SERPL-MCNC: 6.4 G/DL — SIGNIFICANT CHANGE UP (ref 6–8.3)
PROT UR-MCNC: NEGATIVE MG/DL — SIGNIFICANT CHANGE UP
PROTHROM AB SERPL-ACNC: 10.4 SEC — SIGNIFICANT CHANGE UP (ref 9.5–13)
PROTHROM AB SERPL-ACNC: 10.6 SEC — SIGNIFICANT CHANGE UP (ref 9.5–13)
PROTHROM AB SERPL-ACNC: 12 SEC — SIGNIFICANT CHANGE UP (ref 9.5–13)
PROTHROM AB SERPL-ACNC: 14.8 SEC — HIGH (ref 9.5–13)
PROTHROM AB SERPL-ACNC: 15.8 SEC — HIGH (ref 9.5–13)
PTH-INTACT FLD-MCNC: 129 PG/ML — HIGH (ref 15–65)
RBC # BLD: 1.94 M/UL — LOW (ref 3.8–5.2)
RBC # BLD: 2 M/UL — LOW (ref 3.8–5.2)
RBC # BLD: 2.11 M/UL — LOW (ref 3.8–5.2)
RBC # BLD: 2.18 M/UL — LOW (ref 3.8–5.2)
RBC # BLD: 2.31 M/UL — LOW (ref 3.8–5.2)
RBC # BLD: 2.46 M/UL — LOW (ref 3.8–5.2)
RBC # BLD: 2.64 M/UL — LOW (ref 3.8–5.2)
RBC # BLD: 2.66 M/UL — LOW (ref 3.8–5.2)
RBC # BLD: 2.87 M/UL — LOW (ref 3.8–5.2)
RBC # BLD: 2.94 M/UL — LOW (ref 3.8–5.2)
RBC # BLD: 3 M/UL — LOW (ref 3.8–5.2)
RBC # BLD: 3.07 M/UL — LOW (ref 3.8–5.2)
RBC # BLD: 3.09 M/UL — LOW (ref 3.8–5.2)
RBC # BLD: 3.26 M/UL — LOW (ref 3.8–5.2)
RBC # BLD: 3.29 M/UL — LOW (ref 3.8–5.2)
RBC # BLD: 3.4 M/UL — LOW (ref 3.8–5.2)
RBC # BLD: 3.47 M/UL — LOW (ref 3.8–5.2)
RBC # BLD: 3.58 M/UL — LOW (ref 3.8–5.2)
RBC # FLD: 12.3 % — SIGNIFICANT CHANGE UP (ref 10.3–14.5)
RBC # FLD: 12.4 % — SIGNIFICANT CHANGE UP (ref 10.3–14.5)
RBC # FLD: 14.4 % — SIGNIFICANT CHANGE UP (ref 10.3–14.5)
RBC # FLD: 14.7 % — HIGH (ref 10.3–14.5)
RBC # FLD: 14.8 % — HIGH (ref 10.3–14.5)
RBC # FLD: 14.8 % — HIGH (ref 10.3–14.5)
RBC # FLD: 14.9 % — HIGH (ref 10.3–14.5)
RBC # FLD: 15.1 % — HIGH (ref 10.3–14.5)
RBC # FLD: 15.2 % — HIGH (ref 10.3–14.5)
RBC # FLD: 16 % — HIGH (ref 10.3–14.5)
RBC # FLD: 16 % — HIGH (ref 10.3–14.5)
RBC # FLD: 16.5 % — HIGH (ref 10.3–14.5)
RBC # FLD: 16.7 % — HIGH (ref 10.3–14.5)
RBC # FLD: 16.9 % — HIGH (ref 10.3–14.5)
RBC # FLD: 17.1 % — HIGH (ref 10.3–14.5)
RBC # FLD: 17.1 % — HIGH (ref 10.3–14.5)
RBC # FLD: 17.2 % — HIGH (ref 10.3–14.5)
RBC # FLD: 17.4 % — HIGH (ref 10.3–14.5)
S AUREUS DNA NOSE QL NAA+PROBE: SIGNIFICANT CHANGE UP
SALICYLATES SERPL-MCNC: 12.2 MG/DL — SIGNIFICANT CHANGE UP (ref 2.8–20)
SAO2 % BLDA: 69.1 % — LOW (ref 94–98)
SAO2 % BLDA: 90.1 % — LOW (ref 94–98)
SAO2 % BLDA: 93.2 % — LOW (ref 94–98)
SAO2 % BLDA: 97 % — SIGNIFICANT CHANGE UP (ref 94–98)
SAO2 % BLDA: 98.3 % — HIGH (ref 94–98)
SAO2 % BLDA: 98.6 % — HIGH (ref 94–98)
SARS-COV-2 RNA SPEC QL NAA+PROBE: SIGNIFICANT CHANGE UP
SODIUM SERPL-SCNC: 128 MMOL/L — LOW (ref 135–145)
SODIUM SERPL-SCNC: 129 MMOL/L — LOW (ref 135–145)
SODIUM SERPL-SCNC: 130 MMOL/L — LOW (ref 135–145)
SODIUM SERPL-SCNC: 134 MMOL/L — LOW (ref 135–145)
SODIUM SERPL-SCNC: 134 MMOL/L — LOW (ref 135–145)
SODIUM SERPL-SCNC: 137 MMOL/L — SIGNIFICANT CHANGE UP (ref 135–145)
SODIUM SERPL-SCNC: 139 MMOL/L — SIGNIFICANT CHANGE UP (ref 135–145)
SODIUM SERPL-SCNC: 140 MMOL/L — SIGNIFICANT CHANGE UP (ref 135–145)
SODIUM SERPL-SCNC: 142 MMOL/L — SIGNIFICANT CHANGE UP (ref 135–145)
SODIUM SERPL-SCNC: 144 MMOL/L — SIGNIFICANT CHANGE UP (ref 135–145)
SODIUM SERPL-SCNC: 145 MMOL/L — SIGNIFICANT CHANGE UP (ref 135–145)
SODIUM SERPL-SCNC: 146 MMOL/L — HIGH (ref 135–145)
SODIUM SERPL-SCNC: 147 MMOL/L — HIGH (ref 135–145)
SODIUM SERPL-SCNC: 148 MMOL/L — HIGH (ref 135–145)
SODIUM SERPL-SCNC: 149 MMOL/L — HIGH (ref 135–145)
SODIUM UR-SCNC: 35 MMOL/L — SIGNIFICANT CHANGE UP
SP GR SPEC: 1.01 — SIGNIFICANT CHANGE UP (ref 1–1.03)
SPECIMEN SOURCE: SIGNIFICANT CHANGE UP
T3 SERPL-MCNC: 26 NG/DL — LOW (ref 80–200)
T4 AB SER-ACNC: 6.5 UG/DL — SIGNIFICANT CHANGE UP (ref 4.6–12)
T4 FREE SERPL-MCNC: 0.8 NG/DL — LOW (ref 0.9–1.8)
T4 FREE SERPL-MCNC: 0.8 NG/DL — LOW (ref 0.9–1.8)
T4/T3 UPTAKE INDEX SERPL: 1 TBI — SIGNIFICANT CHANGE UP (ref 0.8–1.3)
TIBC SERPL-MCNC: 239 UG/DL — SIGNIFICANT CHANGE UP (ref 220–430)
TRANSFERRIN SERPL-MCNC: 189 MG/DL — LOW (ref 200–360)
TRIGL SERPL-MCNC: 21 MG/DL — SIGNIFICANT CHANGE UP
TRIGL SERPL-MCNC: <10 MG/DL — SIGNIFICANT CHANGE UP
TROPONIN I, HIGH SENSITIVITY RESULT: 12.4 NG/L — SIGNIFICANT CHANGE UP
TROPONIN I, HIGH SENSITIVITY RESULT: 265.6 NG/L — HIGH
TSH SERPL-MCNC: 10.5 UIU/ML — HIGH (ref 0.36–3.74)
TSH SERPL-MCNC: 11.4 UIU/ML — HIGH (ref 0.36–3.74)
TSH SERPL-MCNC: 4.02 UIU/ML — HIGH (ref 0.36–3.74)
UIBC SERPL-MCNC: 142 UG/DL — SIGNIFICANT CHANGE UP (ref 110–370)
UROBILINOGEN FLD QL: 0.2 MG/DL — SIGNIFICANT CHANGE UP (ref 0.2–1)
VANCOMYCIN FLD-MCNC: 16 UG/ML — SIGNIFICANT CHANGE UP (ref 10–25)
VIT B12 SERPL-MCNC: 1988 PG/ML — HIGH (ref 232–1245)
VIT D25+D1,25 OH+D1,25 PNL SERPL-MCNC: 89.9 PG/ML — HIGH (ref 19.9–79.3)
WBC # BLD: 0.69 K/UL — CRITICAL LOW (ref 3.8–10.5)
WBC # BLD: 1.16 K/UL — LOW (ref 3.8–10.5)
WBC # BLD: 1.25 K/UL — LOW (ref 3.8–10.5)
WBC # BLD: 1.47 K/UL — LOW (ref 3.8–10.5)
WBC # BLD: 1.48 K/UL — LOW (ref 3.8–10.5)
WBC # BLD: 1.51 K/UL — LOW (ref 3.8–10.5)
WBC # BLD: 1.63 K/UL — LOW (ref 3.8–10.5)
WBC # BLD: 2.06 K/UL — LOW (ref 3.8–10.5)
WBC # BLD: 2.16 K/UL — LOW (ref 3.8–10.5)
WBC # BLD: 2.17 K/UL — LOW (ref 3.8–10.5)
WBC # BLD: 2.27 K/UL — LOW (ref 3.8–10.5)
WBC # BLD: 2.45 K/UL — LOW (ref 3.8–10.5)
WBC # BLD: 2.45 K/UL — LOW (ref 3.8–10.5)
WBC # BLD: 2.5 K/UL — LOW (ref 3.8–10.5)
WBC # BLD: 3.24 K/UL — LOW (ref 3.8–10.5)
WBC # BLD: 3.32 K/UL — LOW (ref 3.8–10.5)
WBC # BLD: 3.38 K/UL — LOW (ref 3.8–10.5)
WBC # BLD: 4.56 K/UL — SIGNIFICANT CHANGE UP (ref 3.8–10.5)
WBC # FLD AUTO: 0.69 K/UL — CRITICAL LOW (ref 3.8–10.5)
WBC # FLD AUTO: 1.16 K/UL — LOW (ref 3.8–10.5)
WBC # FLD AUTO: 1.25 K/UL — LOW (ref 3.8–10.5)
WBC # FLD AUTO: 1.47 K/UL — LOW (ref 3.8–10.5)
WBC # FLD AUTO: 1.48 K/UL — LOW (ref 3.8–10.5)
WBC # FLD AUTO: 1.51 K/UL — LOW (ref 3.8–10.5)
WBC # FLD AUTO: 1.63 K/UL — LOW (ref 3.8–10.5)
WBC # FLD AUTO: 2.06 K/UL — LOW (ref 3.8–10.5)
WBC # FLD AUTO: 2.16 K/UL — LOW (ref 3.8–10.5)
WBC # FLD AUTO: 2.17 K/UL — LOW (ref 3.8–10.5)
WBC # FLD AUTO: 2.27 K/UL — LOW (ref 3.8–10.5)
WBC # FLD AUTO: 2.45 K/UL — LOW (ref 3.8–10.5)
WBC # FLD AUTO: 2.45 K/UL — LOW (ref 3.8–10.5)
WBC # FLD AUTO: 2.5 K/UL — LOW (ref 3.8–10.5)
WBC # FLD AUTO: 3.24 K/UL — LOW (ref 3.8–10.5)
WBC # FLD AUTO: 3.32 K/UL — LOW (ref 3.8–10.5)
WBC # FLD AUTO: 3.38 K/UL — LOW (ref 3.8–10.5)
WBC # FLD AUTO: 4.56 K/UL — SIGNIFICANT CHANGE UP (ref 3.8–10.5)

## 2023-01-01 PROCEDURE — 36430 TRANSFUSION BLD/BLD COMPNT: CPT

## 2023-01-01 PROCEDURE — 83550 IRON BINDING TEST: CPT

## 2023-01-01 PROCEDURE — 36556 INSERT NON-TUNNEL CV CATH: CPT | Mod: GC

## 2023-01-01 PROCEDURE — 99221 1ST HOSP IP/OBS SF/LOW 40: CPT

## 2023-01-01 PROCEDURE — 71260 CT THORAX DX C+: CPT

## 2023-01-01 PROCEDURE — 82607 VITAMIN B-12: CPT

## 2023-01-01 PROCEDURE — 99291 CRITICAL CARE FIRST HOUR: CPT

## 2023-01-01 PROCEDURE — 82140 ASSAY OF AMMONIA: CPT

## 2023-01-01 PROCEDURE — 87150 DNA/RNA AMPLIFIED PROBE: CPT

## 2023-01-01 PROCEDURE — 87641 MR-STAPH DNA AMP PROBE: CPT

## 2023-01-01 PROCEDURE — 74018 RADEX ABDOMEN 1 VIEW: CPT | Mod: 26

## 2023-01-01 PROCEDURE — 82962 GLUCOSE BLOOD TEST: CPT

## 2023-01-01 PROCEDURE — 74176 CT ABD & PELVIS W/O CONTRAST: CPT | Mod: MA

## 2023-01-01 PROCEDURE — 99222 1ST HOSP IP/OBS MODERATE 55: CPT

## 2023-01-01 PROCEDURE — 93306 TTE W/DOPPLER COMPLETE: CPT | Mod: 26

## 2023-01-01 PROCEDURE — 72125 CT NECK SPINE W/O DYE: CPT | Mod: MA

## 2023-01-01 PROCEDURE — 99233 SBSQ HOSP IP/OBS HIGH 50: CPT

## 2023-01-01 PROCEDURE — 74174 CTA ABD&PLVS W/CONTRAST: CPT

## 2023-01-01 PROCEDURE — 84443 ASSAY THYROID STIM HORMONE: CPT

## 2023-01-01 PROCEDURE — 72131 CT LUMBAR SPINE W/O DYE: CPT | Mod: QQ

## 2023-01-01 PROCEDURE — 73070 X-RAY EXAM OF ELBOW: CPT

## 2023-01-01 PROCEDURE — 93308 TTE F-UP OR LMTD: CPT | Mod: 26

## 2023-01-01 PROCEDURE — 36600 WITHDRAWAL OF ARTERIAL BLOOD: CPT

## 2023-01-01 PROCEDURE — 76604 US EXAM CHEST: CPT | Mod: 26

## 2023-01-01 PROCEDURE — 71045 X-RAY EXAM CHEST 1 VIEW: CPT | Mod: 26,77

## 2023-01-01 PROCEDURE — 93005 ELECTROCARDIOGRAM TRACING: CPT

## 2023-01-01 PROCEDURE — 93010 ELECTROCARDIOGRAM REPORT: CPT

## 2023-01-01 PROCEDURE — 87635 SARS-COV-2 COVID-19 AMP PRB: CPT

## 2023-01-01 PROCEDURE — 84133 ASSAY OF URINE POTASSIUM: CPT

## 2023-01-01 PROCEDURE — 74174 CTA ABD&PLVS W/CONTRAST: CPT | Mod: 26

## 2023-01-01 PROCEDURE — 84466 ASSAY OF TRANSFERRIN: CPT

## 2023-01-01 PROCEDURE — 72125 CT NECK SPINE W/O DYE: CPT | Mod: 26,MA

## 2023-01-01 PROCEDURE — 87186 SC STD MICRODIL/AGAR DIL: CPT

## 2023-01-01 PROCEDURE — 86666 EHRLICHIA ANTIBODY: CPT

## 2023-01-01 PROCEDURE — 76705 ECHO EXAM OF ABDOMEN: CPT | Mod: 26

## 2023-01-01 PROCEDURE — 99223 1ST HOSP IP/OBS HIGH 75: CPT | Mod: GC

## 2023-01-01 PROCEDURE — 83735 ASSAY OF MAGNESIUM: CPT

## 2023-01-01 PROCEDURE — 80053 COMPREHEN METABOLIC PANEL: CPT

## 2023-01-01 PROCEDURE — 43752 NASAL/OROGASTRIC W/TUBE PLMT: CPT | Mod: 59

## 2023-01-01 PROCEDURE — 84484 ASSAY OF TROPONIN QUANT: CPT

## 2023-01-01 PROCEDURE — 80202 ASSAY OF VANCOMYCIN: CPT

## 2023-01-01 PROCEDURE — 72170 X-RAY EXAM OF PELVIS: CPT | Mod: 26

## 2023-01-01 PROCEDURE — 99284 EMERGENCY DEPT VISIT MOD MDM: CPT | Mod: 25

## 2023-01-01 PROCEDURE — 71250 CT THORAX DX C-: CPT | Mod: 26,MA

## 2023-01-01 PROCEDURE — 85018 HEMOGLOBIN: CPT

## 2023-01-01 PROCEDURE — 84478 ASSAY OF TRIGLYCERIDES: CPT

## 2023-01-01 PROCEDURE — 70450 CT HEAD/BRAIN W/O DYE: CPT | Mod: 26

## 2023-01-01 PROCEDURE — 85610 PROTHROMBIN TIME: CPT

## 2023-01-01 PROCEDURE — P9100: CPT

## 2023-01-01 PROCEDURE — 99232 SBSQ HOSP IP/OBS MODERATE 35: CPT

## 2023-01-01 PROCEDURE — 83540 ASSAY OF IRON: CPT

## 2023-01-01 PROCEDURE — 85025 COMPLETE CBC W/AUTO DIFF WBC: CPT

## 2023-01-01 PROCEDURE — 84702 CHORIONIC GONADOTROPIN TEST: CPT

## 2023-01-01 PROCEDURE — P9037: CPT

## 2023-01-01 PROCEDURE — 93308 TTE F-UP OR LMTD: CPT | Mod: 26,GC

## 2023-01-01 PROCEDURE — 86753 PROTOZOA ANTIBODY NOS: CPT

## 2023-01-01 PROCEDURE — 84436 ASSAY OF TOTAL THYROXINE: CPT

## 2023-01-01 PROCEDURE — 82570 ASSAY OF URINE CREATININE: CPT

## 2023-01-01 PROCEDURE — 73120 X-RAY EXAM OF HAND: CPT

## 2023-01-01 PROCEDURE — 87070 CULTURE OTHR SPECIMN AEROBIC: CPT

## 2023-01-01 PROCEDURE — 70450 CT HEAD/BRAIN W/O DYE: CPT | Mod: MA

## 2023-01-01 PROCEDURE — 80307 DRUG TEST PRSMV CHEM ANLYZR: CPT

## 2023-01-01 PROCEDURE — 99233 SBSQ HOSP IP/OBS HIGH 50: CPT | Mod: 25

## 2023-01-01 PROCEDURE — 71045 X-RAY EXAM CHEST 1 VIEW: CPT | Mod: 26

## 2023-01-01 PROCEDURE — 82746 ASSAY OF FOLIC ACID SERUM: CPT

## 2023-01-01 PROCEDURE — 73120 X-RAY EXAM OF HAND: CPT | Mod: 26,LT

## 2023-01-01 PROCEDURE — 36415 COLL VENOUS BLD VENIPUNCTURE: CPT

## 2023-01-01 PROCEDURE — 87040 BLOOD CULTURE FOR BACTERIA: CPT

## 2023-01-01 PROCEDURE — 83036 HEMOGLOBIN GLYCOSYLATED A1C: CPT

## 2023-01-01 PROCEDURE — 99284 EMERGENCY DEPT VISIT MOD MDM: CPT

## 2023-01-01 PROCEDURE — 86901 BLOOD TYPING SEROLOGIC RH(D): CPT

## 2023-01-01 PROCEDURE — 70450 CT HEAD/BRAIN W/O DYE: CPT | Mod: 26,MA

## 2023-01-01 PROCEDURE — P9016: CPT

## 2023-01-01 PROCEDURE — 80061 LIPID PANEL: CPT

## 2023-01-01 PROCEDURE — 72131 CT LUMBAR SPINE W/O DYE: CPT | Mod: 26,QQ

## 2023-01-01 PROCEDURE — 81001 URINALYSIS AUTO W/SCOPE: CPT

## 2023-01-01 PROCEDURE — 82553 CREATINE MB FRACTION: CPT

## 2023-01-01 PROCEDURE — 73070 X-RAY EXAM OF ELBOW: CPT | Mod: 26,LT

## 2023-01-01 PROCEDURE — 87640 STAPH A DNA AMP PROBE: CPT

## 2023-01-01 PROCEDURE — 84439 ASSAY OF FREE THYROXINE: CPT

## 2023-01-01 PROCEDURE — 85014 HEMATOCRIT: CPT

## 2023-01-01 PROCEDURE — 87184 SC STD DISK METHOD PER PLATE: CPT

## 2023-01-01 PROCEDURE — 83880 ASSAY OF NATRIURETIC PEPTIDE: CPT

## 2023-01-01 PROCEDURE — 76604 US EXAM CHEST: CPT | Mod: 26,GC

## 2023-01-01 PROCEDURE — P9045: CPT

## 2023-01-01 PROCEDURE — 82803 BLOOD GASES ANY COMBINATION: CPT

## 2023-01-01 PROCEDURE — 83935 ASSAY OF URINE OSMOLALITY: CPT

## 2023-01-01 PROCEDURE — 73090 X-RAY EXAM OF FOREARM: CPT | Mod: 26,LT

## 2023-01-01 PROCEDURE — 73590 X-RAY EXAM OF LOWER LEG: CPT

## 2023-01-01 PROCEDURE — 83970 ASSAY OF PARATHORMONE: CPT

## 2023-01-01 PROCEDURE — 74176 CT ABD & PELVIS W/O CONTRAST: CPT | Mod: 26,MA

## 2023-01-01 PROCEDURE — 74018 RADEX ABDOMEN 1 VIEW: CPT

## 2023-01-01 PROCEDURE — 87086 URINE CULTURE/COLONY COUNT: CPT

## 2023-01-01 PROCEDURE — 82728 ASSAY OF FERRITIN: CPT

## 2023-01-01 PROCEDURE — 82306 VITAMIN D 25 HYDROXY: CPT

## 2023-01-01 PROCEDURE — 96374 THER/PROPH/DIAG INJ IV PUSH: CPT

## 2023-01-01 PROCEDURE — 85730 THROMBOPLASTIN TIME PARTIAL: CPT

## 2023-01-01 PROCEDURE — 84479 ASSAY OF THYROID (T3 OR T4): CPT

## 2023-01-01 PROCEDURE — 86850 RBC ANTIBODY SCREEN: CPT

## 2023-01-01 PROCEDURE — 73110 X-RAY EXAM OF WRIST: CPT

## 2023-01-01 PROCEDURE — 87798 DETECT AGENT NOS DNA AMP: CPT

## 2023-01-01 PROCEDURE — 84300 ASSAY OF URINE SODIUM: CPT

## 2023-01-01 PROCEDURE — 73562 X-RAY EXAM OF KNEE 3: CPT | Mod: 26,LT

## 2023-01-01 PROCEDURE — 71045 X-RAY EXAM CHEST 1 VIEW: CPT

## 2023-01-01 PROCEDURE — 94760 N-INVAS EAR/PLS OXIMETRY 1: CPT

## 2023-01-01 PROCEDURE — 73552 X-RAY EXAM OF FEMUR 2/>: CPT | Mod: 26,LT

## 2023-01-01 PROCEDURE — 93306 TTE W/DOPPLER COMPLETE: CPT

## 2023-01-01 PROCEDURE — 73110 X-RAY EXAM OF WRIST: CPT | Mod: 26,LT

## 2023-01-01 PROCEDURE — 73552 X-RAY EXAM OF FEMUR 2/>: CPT

## 2023-01-01 PROCEDURE — 99292 CRITICAL CARE ADDL 30 MIN: CPT | Mod: 25

## 2023-01-01 PROCEDURE — 82550 ASSAY OF CK (CPK): CPT

## 2023-01-01 PROCEDURE — 85027 COMPLETE CBC AUTOMATED: CPT

## 2023-01-01 PROCEDURE — 94799 UNLISTED PULMONARY SVC/PX: CPT

## 2023-01-01 PROCEDURE — 71260 CT THORAX DX C+: CPT | Mod: 26

## 2023-01-01 PROCEDURE — 83605 ASSAY OF LACTIC ACID: CPT

## 2023-01-01 PROCEDURE — 84480 ASSAY TRIIODOTHYRONINE (T3): CPT

## 2023-01-01 PROCEDURE — 72128 CT CHEST SPINE W/O DYE: CPT | Mod: 26,QQ

## 2023-01-01 PROCEDURE — 73090 X-RAY EXAM OF FOREARM: CPT

## 2023-01-01 PROCEDURE — 71250 CT THORAX DX C-: CPT | Mod: MA

## 2023-01-01 PROCEDURE — 72128 CT CHEST SPINE W/O DYE: CPT | Mod: QQ

## 2023-01-01 PROCEDURE — 86923 COMPATIBILITY TEST ELECTRIC: CPT

## 2023-01-01 PROCEDURE — 73590 X-RAY EXAM OF LOWER LEG: CPT | Mod: 26,LT

## 2023-01-01 PROCEDURE — 94002 VENT MGMT INPAT INIT DAY: CPT

## 2023-01-01 PROCEDURE — 73562 X-RAY EXAM OF KNEE 3: CPT

## 2023-01-01 PROCEDURE — 99285 EMERGENCY DEPT VISIT HI MDM: CPT | Mod: FS

## 2023-01-01 PROCEDURE — 72170 X-RAY EXAM OF PELVIS: CPT

## 2023-01-01 PROCEDURE — 82310 ASSAY OF CALCIUM: CPT

## 2023-01-01 PROCEDURE — 86618 LYME DISEASE ANTIBODY: CPT

## 2023-01-01 PROCEDURE — 99291 CRITICAL CARE FIRST HOUR: CPT | Mod: 25

## 2023-01-01 PROCEDURE — 71045 X-RAY EXAM CHEST 1 VIEW: CPT | Mod: 26,76

## 2023-01-01 PROCEDURE — 96376 TX/PRO/DX INJ SAME DRUG ADON: CPT

## 2023-01-01 PROCEDURE — 92610 EVALUATE SWALLOWING FUNCTION: CPT

## 2023-01-01 PROCEDURE — 84100 ASSAY OF PHOSPHORUS: CPT

## 2023-01-01 PROCEDURE — 82533 TOTAL CORTISOL: CPT

## 2023-01-01 PROCEDURE — 99285 EMERGENCY DEPT VISIT HI MDM: CPT

## 2023-01-01 PROCEDURE — 80048 BASIC METABOLIC PNL TOTAL CA: CPT

## 2023-01-01 PROCEDURE — 82652 VIT D 1 25-DIHYDROXY: CPT

## 2023-01-01 PROCEDURE — 87077 CULTURE AEROBIC IDENTIFY: CPT

## 2023-01-01 PROCEDURE — 94003 VENT MGMT INPAT SUBQ DAY: CPT

## 2023-01-01 PROCEDURE — 76705 ECHO EXAM OF ABDOMEN: CPT

## 2023-01-01 PROCEDURE — 86900 BLOOD TYPING SEROLOGIC ABO: CPT

## 2023-01-01 PROCEDURE — 83690 ASSAY OF LIPASE: CPT

## 2023-01-01 RX ORDER — SODIUM CHLORIDE 9 MG/ML
1000 INJECTION, SOLUTION INTRAVENOUS
Refills: 0 | Status: DISCONTINUED | OUTPATIENT
Start: 2023-01-01 | End: 2023-01-01

## 2023-01-01 RX ORDER — SODIUM CHLORIDE 9 MG/ML
500 INJECTION, SOLUTION INTRAVENOUS ONCE
Refills: 0 | Status: COMPLETED | OUTPATIENT
Start: 2023-01-01 | End: 2023-01-01

## 2023-01-01 RX ORDER — DEXTROSE 50 % IN WATER 50 %
25 SYRINGE (ML) INTRAVENOUS ONCE
Refills: 0 | Status: COMPLETED | OUTPATIENT
Start: 2023-01-01 | End: 2023-01-01

## 2023-01-01 RX ORDER — POTASSIUM CHLORIDE 20 MEQ
10 PACKET (EA) ORAL
Refills: 0 | Status: COMPLETED | OUTPATIENT
Start: 2023-01-01 | End: 2023-01-01

## 2023-01-01 RX ORDER — CALCIUM GLUCONATE 100 MG/ML
1 VIAL (ML) INTRAVENOUS ONCE
Refills: 0 | Status: COMPLETED | OUTPATIENT
Start: 2023-01-01 | End: 2023-01-01

## 2023-01-01 RX ORDER — DEXTROSE 50 % IN WATER 50 %
25 SYRINGE (ML) INTRAVENOUS ONCE
Refills: 0 | Status: DISCONTINUED | OUTPATIENT
Start: 2023-01-01 | End: 2023-01-01

## 2023-01-01 RX ORDER — POTASSIUM CHLORIDE 20 MEQ
40 PACKET (EA) ORAL ONCE
Refills: 0 | Status: DISCONTINUED | OUTPATIENT
Start: 2023-01-01 | End: 2023-01-01

## 2023-01-01 RX ORDER — HEPARIN SODIUM 5000 [USP'U]/ML
5000 INJECTION INTRAVENOUS; SUBCUTANEOUS EVERY 12 HOURS
Refills: 0 | Status: DISCONTINUED | OUTPATIENT
Start: 2023-01-01 | End: 2023-01-01

## 2023-01-01 RX ORDER — FENTANYL CITRATE 50 UG/ML
25 INJECTION INTRAVENOUS EVERY 4 HOURS
Refills: 0 | Status: DISCONTINUED | OUTPATIENT
Start: 2023-01-01 | End: 2023-01-01

## 2023-01-01 RX ORDER — FAMOTIDINE 10 MG/ML
20 INJECTION INTRAVENOUS
Refills: 0 | Status: DISCONTINUED | OUTPATIENT
Start: 2023-01-01 | End: 2023-01-01

## 2023-01-01 RX ORDER — MIDAZOLAM HYDROCHLORIDE 1 MG/ML
2 INJECTION, SOLUTION INTRAMUSCULAR; INTRAVENOUS ONCE
Refills: 0 | Status: DISCONTINUED | OUTPATIENT
Start: 2023-01-01 | End: 2023-01-01

## 2023-01-01 RX ORDER — VANCOMYCIN HCL 1 G
500 VIAL (EA) INTRAVENOUS EVERY 12 HOURS
Refills: 0 | Status: DISCONTINUED | OUTPATIENT
Start: 2023-01-01 | End: 2023-01-01

## 2023-01-01 RX ORDER — NOREPINEPHRINE BITARTRATE/D5W 8 MG/250ML
0.05 PLASTIC BAG, INJECTION (ML) INTRAVENOUS
Qty: 8 | Refills: 0 | Status: DISCONTINUED | OUTPATIENT
Start: 2023-01-01 | End: 2023-01-01

## 2023-01-01 RX ORDER — PROPOFOL 10 MG/ML
20 INJECTION, EMULSION INTRAVENOUS
Qty: 1000 | Refills: 0 | Status: DISCONTINUED | OUTPATIENT
Start: 2023-01-01 | End: 2023-01-01

## 2023-01-01 RX ORDER — CALCIUM GLUCONATE 100 MG/ML
2 VIAL (ML) INTRAVENOUS ONCE
Refills: 0 | Status: COMPLETED | OUTPATIENT
Start: 2023-01-01 | End: 2023-01-01

## 2023-01-01 RX ORDER — POTASSIUM CHLORIDE 20 MEQ
20 PACKET (EA) ORAL ONCE
Refills: 0 | Status: COMPLETED | OUTPATIENT
Start: 2023-01-01 | End: 2023-01-01

## 2023-01-01 RX ORDER — DEXTROSE 50 % IN WATER 50 %
50 SYRINGE (ML) INTRAVENOUS ONCE
Refills: 0 | Status: DISCONTINUED | OUTPATIENT
Start: 2023-01-01 | End: 2023-01-01

## 2023-01-01 RX ORDER — THIAMINE MONONITRATE (VIT B1) 100 MG
500 TABLET ORAL THREE TIMES A DAY
Refills: 0 | Status: DISCONTINUED | OUTPATIENT
Start: 2023-01-01 | End: 2023-01-01

## 2023-01-01 RX ORDER — POTASSIUM PHOSPHATE, MONOBASIC POTASSIUM PHOSPHATE, DIBASIC 236; 224 MG/ML; MG/ML
15 INJECTION, SOLUTION INTRAVENOUS ONCE
Refills: 0 | Status: COMPLETED | OUTPATIENT
Start: 2023-01-01 | End: 2023-01-01

## 2023-01-01 RX ORDER — MULTIVIT-MIN/FERROUS GLUCONATE 9 MG/15 ML
15 LIQUID (ML) ORAL DAILY
Refills: 0 | Status: DISCONTINUED | OUTPATIENT
Start: 2023-01-01 | End: 2023-01-01

## 2023-01-01 RX ORDER — POTASSIUM CHLORIDE 20 MEQ
40 PACKET (EA) ORAL ONCE
Refills: 0 | Status: COMPLETED | OUTPATIENT
Start: 2023-01-01 | End: 2023-01-01

## 2023-01-01 RX ORDER — VASOPRESSIN 20 [USP'U]/ML
0.04 INJECTION INTRAVENOUS
Qty: 40 | Refills: 0 | Status: DISCONTINUED | OUTPATIENT
Start: 2023-01-01 | End: 2023-01-01

## 2023-01-01 RX ORDER — PREGABALIN 225 MG/1
1000 CAPSULE ORAL DAILY
Refills: 0 | Status: DISCONTINUED | OUTPATIENT
Start: 2023-01-01 | End: 2023-01-01

## 2023-01-01 RX ORDER — SODIUM CHLORIDE 9 MG/ML
1000 INJECTION, SOLUTION INTRAVENOUS ONCE
Refills: 0 | Status: DISCONTINUED | OUTPATIENT
Start: 2023-01-01 | End: 2023-01-01

## 2023-01-01 RX ORDER — GLUCAGON INJECTION, SOLUTION 0.5 MG/.1ML
1 INJECTION, SOLUTION SUBCUTANEOUS ONCE
Refills: 0 | Status: DISCONTINUED | OUTPATIENT
Start: 2023-01-01 | End: 2023-01-01

## 2023-01-01 RX ORDER — MAGNESIUM SULFATE 500 MG/ML
1 VIAL (ML) INJECTION ONCE
Refills: 0 | Status: COMPLETED | OUTPATIENT
Start: 2023-01-01 | End: 2023-01-01

## 2023-01-01 RX ORDER — ALBUMIN HUMAN 25 %
250 VIAL (ML) INTRAVENOUS ONCE
Refills: 0 | Status: COMPLETED | OUTPATIENT
Start: 2023-01-01 | End: 2023-01-01

## 2023-01-01 RX ORDER — FENTANYL CITRATE 50 UG/ML
50 INJECTION INTRAVENOUS ONCE
Refills: 0 | Status: DISCONTINUED | OUTPATIENT
Start: 2023-01-01 | End: 2023-01-01

## 2023-01-01 RX ORDER — THIAMINE MONONITRATE (VIT B1) 100 MG
200 TABLET ORAL DAILY
Refills: 0 | Status: DISCONTINUED | OUTPATIENT
Start: 2023-01-01 | End: 2023-01-01

## 2023-01-01 RX ORDER — CEFEPIME 1 G/1
2000 INJECTION, POWDER, FOR SOLUTION INTRAMUSCULAR; INTRAVENOUS EVERY 8 HOURS
Refills: 0 | Status: DISCONTINUED | OUTPATIENT
Start: 2023-01-01 | End: 2023-01-01

## 2023-01-01 RX ORDER — DEXTROSE 50 % IN WATER 50 %
12.5 SYRINGE (ML) INTRAVENOUS ONCE
Refills: 0 | Status: DISCONTINUED | OUTPATIENT
Start: 2023-01-01 | End: 2023-01-01

## 2023-01-01 RX ORDER — SODIUM CHLORIDE 9 MG/ML
250 INJECTION INTRAMUSCULAR; INTRAVENOUS; SUBCUTANEOUS ONCE
Refills: 0 | Status: DISCONTINUED | OUTPATIENT
Start: 2023-01-01 | End: 2023-01-01

## 2023-01-01 RX ORDER — PROPOFOL 10 MG/ML
10 INJECTION, EMULSION INTRAVENOUS
Qty: 1000 | Refills: 0 | Status: DISCONTINUED | OUTPATIENT
Start: 2023-01-01 | End: 2023-01-01

## 2023-01-01 RX ORDER — POTASSIUM CHLORIDE 20 MEQ
20 PACKET (EA) ORAL ONCE
Refills: 0 | Status: DISCONTINUED | OUTPATIENT
Start: 2023-01-01 | End: 2023-01-01

## 2023-01-01 RX ORDER — DEXTROSE 50 % IN WATER 50 %
15 SYRINGE (ML) INTRAVENOUS ONCE
Refills: 0 | Status: DISCONTINUED | OUTPATIENT
Start: 2023-01-01 | End: 2023-01-01

## 2023-01-01 RX ORDER — MIDAZOLAM HYDROCHLORIDE 1 MG/ML
1 INJECTION, SOLUTION INTRAMUSCULAR; INTRAVENOUS ONCE
Refills: 0 | Status: DISCONTINUED | OUTPATIENT
Start: 2023-01-01 | End: 2023-01-01

## 2023-01-01 RX ORDER — CHLORHEXIDINE GLUCONATE 213 G/1000ML
15 SOLUTION TOPICAL EVERY 12 HOURS
Refills: 0 | Status: DISCONTINUED | OUTPATIENT
Start: 2023-01-01 | End: 2023-01-01

## 2023-01-01 RX ORDER — MIDAZOLAM HYDROCHLORIDE 1 MG/ML
4 INJECTION, SOLUTION INTRAMUSCULAR; INTRAVENOUS ONCE
Refills: 0 | Status: DISCONTINUED | OUTPATIENT
Start: 2023-01-01 | End: 2023-01-01

## 2023-01-01 RX ORDER — LEVOTHYROXINE SODIUM 125 MCG
25 TABLET ORAL DAILY
Refills: 0 | Status: DISCONTINUED | OUTPATIENT
Start: 2023-01-01 | End: 2023-01-01

## 2023-01-01 RX ORDER — LEVOTHYROXINE SODIUM 125 MCG
18.5 TABLET ORAL AT BEDTIME
Refills: 0 | Status: DISCONTINUED | OUTPATIENT
Start: 2023-01-01 | End: 2023-01-01

## 2023-01-01 RX ORDER — MAGNESIUM SULFATE 500 MG/ML
2 VIAL (ML) INJECTION ONCE
Refills: 0 | Status: COMPLETED | OUTPATIENT
Start: 2023-01-01 | End: 2023-01-01

## 2023-01-01 RX ORDER — FAMOTIDINE 10 MG/ML
20 INJECTION INTRAVENOUS DAILY
Refills: 0 | Status: COMPLETED | OUTPATIENT
Start: 2023-01-01 | End: 2023-01-01

## 2023-01-01 RX ORDER — HYDROMORPHONE HYDROCHLORIDE 2 MG/ML
0.5 INJECTION INTRAMUSCULAR; INTRAVENOUS; SUBCUTANEOUS ONCE
Refills: 0 | Status: DISCONTINUED | OUTPATIENT
Start: 2023-01-01 | End: 2023-01-01

## 2023-01-01 RX ORDER — TETANUS TOXOID, REDUCED DIPHTHERIA TOXOID AND ACELLULAR PERTUSSIS VACCINE, ADSORBED 5; 2.5; 8; 8; 2.5 [IU]/.5ML; [IU]/.5ML; UG/.5ML; UG/.5ML; UG/.5ML
0.5 SUSPENSION INTRAMUSCULAR ONCE
Refills: 0 | Status: DISCONTINUED | OUTPATIENT
Start: 2023-01-01 | End: 2023-01-01

## 2023-01-01 RX ORDER — CALCIUM GLUCONATE 100 MG/ML
2 VIAL (ML) INTRAVENOUS ONCE
Refills: 0 | Status: DISCONTINUED | OUTPATIENT
Start: 2023-01-01 | End: 2023-01-01

## 2023-01-01 RX ORDER — CHLORHEXIDINE GLUCONATE 213 G/1000ML
1 SOLUTION TOPICAL
Refills: 0 | Status: DISCONTINUED | OUTPATIENT
Start: 2023-01-01 | End: 2023-01-01

## 2023-01-01 RX ORDER — LANOLIN ALCOHOL/MO/W.PET/CERES
3 CREAM (GRAM) TOPICAL AT BEDTIME
Refills: 0 | Status: DISCONTINUED | OUTPATIENT
Start: 2023-01-01 | End: 2023-01-01

## 2023-01-01 RX ORDER — POTASSIUM CHLORIDE 20 MEQ
20 PACKET (EA) ORAL
Refills: 0 | Status: COMPLETED | OUTPATIENT
Start: 2023-01-01 | End: 2023-01-01

## 2023-01-01 RX ORDER — FENTANYL CITRATE 50 UG/ML
100 INJECTION INTRAVENOUS ONCE
Refills: 0 | Status: DISCONTINUED | OUTPATIENT
Start: 2023-01-01 | End: 2023-01-01

## 2023-01-01 RX ORDER — CEFEPIME 1 G/1
2000 INJECTION, POWDER, FOR SOLUTION INTRAMUSCULAR; INTRAVENOUS EVERY 12 HOURS
Refills: 0 | Status: DISCONTINUED | OUTPATIENT
Start: 2023-01-01 | End: 2023-01-01

## 2023-01-01 RX ORDER — POTASSIUM PHOSPHATE, MONOBASIC POTASSIUM PHOSPHATE, DIBASIC 236; 224 MG/ML; MG/ML
30 INJECTION, SOLUTION INTRAVENOUS ONCE
Refills: 0 | Status: DISCONTINUED | OUTPATIENT
Start: 2023-01-01 | End: 2023-01-01

## 2023-01-01 RX ORDER — SODIUM CHLORIDE 9 MG/ML
500 INJECTION INTRAMUSCULAR; INTRAVENOUS; SUBCUTANEOUS ONCE
Refills: 0 | Status: DISCONTINUED | OUTPATIENT
Start: 2023-01-01 | End: 2023-01-01

## 2023-01-01 RX ORDER — DEXTROSE MONOHYDRATE, SODIUM CHLORIDE, AND POTASSIUM CHLORIDE 50; .745; 4.5 G/1000ML; G/1000ML; G/1000ML
1000 INJECTION, SOLUTION INTRAVENOUS
Refills: 0 | Status: DISCONTINUED | OUTPATIENT
Start: 2023-01-01 | End: 2023-01-01

## 2023-01-01 RX ORDER — MORPHINE SULFATE 50 MG/1
2 CAPSULE, EXTENDED RELEASE ORAL ONCE
Refills: 0 | Status: COMPLETED | OUTPATIENT
Start: 2023-01-01 | End: 2023-01-01

## 2023-01-01 RX ORDER — ROBINUL 0.2 MG/ML
0.2 INJECTION INTRAMUSCULAR; INTRAVENOUS ONCE
Refills: 0 | Status: COMPLETED | OUTPATIENT
Start: 2023-01-01 | End: 2023-01-01

## 2023-01-01 RX ORDER — POTASSIUM PHOSPHATE, MONOBASIC POTASSIUM PHOSPHATE, DIBASIC 236; 224 MG/ML; MG/ML
30 INJECTION, SOLUTION INTRAVENOUS ONCE
Refills: 0 | Status: COMPLETED | OUTPATIENT
Start: 2023-01-01 | End: 2023-01-01

## 2023-01-01 RX ORDER — MINOCYCLINE HYDROCHLORIDE 45 MG/1
200 TABLET, EXTENDED RELEASE ORAL ONCE
Refills: 0 | Status: DISCONTINUED | OUTPATIENT
Start: 2023-01-01 | End: 2023-01-01

## 2023-01-01 RX ADMIN — Medication 15 MILLILITER(S): at 11:50

## 2023-01-01 RX ADMIN — Medication 105 MILLIGRAM(S): at 05:24

## 2023-01-01 RX ADMIN — FAMOTIDINE 20 MILLIGRAM(S): 10 INJECTION INTRAVENOUS at 17:52

## 2023-01-01 RX ADMIN — PROPOFOL 3.2 MICROGRAM(S)/KG/MIN: 10 INJECTION, EMULSION INTRAVENOUS at 16:10

## 2023-01-01 RX ADMIN — Medication 2.5 MICROGRAM(S)/KG/MIN: at 19:23

## 2023-01-01 RX ADMIN — SODIUM CHLORIDE 3000 MILLILITER(S): 9 INJECTION, SOLUTION INTRAVENOUS at 21:40

## 2023-01-01 RX ADMIN — Medication 40 MILLIEQUIVALENT(S): at 13:50

## 2023-01-01 RX ADMIN — Medication 25 GRAM(S): at 12:28

## 2023-01-01 RX ADMIN — FAMOTIDINE 20 MILLIGRAM(S): 10 INJECTION INTRAVENOUS at 11:21

## 2023-01-01 RX ADMIN — CHLORHEXIDINE GLUCONATE 15 MILLILITER(S): 213 SOLUTION TOPICAL at 05:24

## 2023-01-01 RX ADMIN — Medication 25 MILLILITER(S): at 21:25

## 2023-01-01 RX ADMIN — Medication 100 MILLIEQUIVALENT(S): at 09:00

## 2023-01-01 RX ADMIN — POTASSIUM PHOSPHATE, MONOBASIC POTASSIUM PHOSPHATE, DIBASIC 62.5 MILLIMOLE(S): 236; 224 INJECTION, SOLUTION INTRAVENOUS at 00:12

## 2023-01-01 RX ADMIN — Medication 2.5 MICROGRAM(S)/KG/MIN: at 19:18

## 2023-01-01 RX ADMIN — Medication 2.5 MICROGRAM(S)/KG/MIN: at 10:11

## 2023-01-01 RX ADMIN — DEXTROSE MONOHYDRATE, SODIUM CHLORIDE, AND POTASSIUM CHLORIDE 70 MILLILITER(S): 50; .745; 4.5 INJECTION, SOLUTION INTRAVENOUS at 13:08

## 2023-01-01 RX ADMIN — Medication 100 MILLIEQUIVALENT(S): at 21:19

## 2023-01-01 RX ADMIN — Medication 100 MILLIEQUIVALENT(S): at 02:17

## 2023-01-01 RX ADMIN — FENTANYL CITRATE 25 MICROGRAM(S): 50 INJECTION INTRAVENOUS at 09:05

## 2023-01-01 RX ADMIN — CEFEPIME 100 MILLIGRAM(S): 1 INJECTION, POWDER, FOR SOLUTION INTRAMUSCULAR; INTRAVENOUS at 13:44

## 2023-01-01 RX ADMIN — HEPARIN SODIUM 5000 UNIT(S): 5000 INJECTION INTRAVENOUS; SUBCUTANEOUS at 05:24

## 2023-01-01 RX ADMIN — Medication 15 MILLILITER(S): at 12:11

## 2023-01-01 RX ADMIN — CHLORHEXIDINE GLUCONATE 15 MILLILITER(S): 213 SOLUTION TOPICAL at 17:01

## 2023-01-01 RX ADMIN — CEFEPIME 100 MILLIGRAM(S): 1 INJECTION, POWDER, FOR SOLUTION INTRAMUSCULAR; INTRAVENOUS at 05:24

## 2023-01-01 RX ADMIN — SODIUM CHLORIDE 75 MILLILITER(S): 9 INJECTION, SOLUTION INTRAVENOUS at 21:08

## 2023-01-01 RX ADMIN — PROPOFOL 3.2 MICROGRAM(S)/KG/MIN: 10 INJECTION, EMULSION INTRAVENOUS at 18:04

## 2023-01-01 RX ADMIN — Medication 100 GRAM(S): at 00:55

## 2023-01-01 RX ADMIN — CHLORHEXIDINE GLUCONATE 15 MILLILITER(S): 213 SOLUTION TOPICAL at 05:05

## 2023-01-01 RX ADMIN — Medication 100 MILLIGRAM(S): at 05:10

## 2023-01-01 RX ADMIN — Medication 105 MILLIGRAM(S): at 13:26

## 2023-01-01 RX ADMIN — CEFEPIME 100 MILLIGRAM(S): 1 INJECTION, POWDER, FOR SOLUTION INTRAMUSCULAR; INTRAVENOUS at 21:02

## 2023-01-01 RX ADMIN — SODIUM CHLORIDE 500 MILLILITER(S): 9 INJECTION, SOLUTION INTRAVENOUS at 12:24

## 2023-01-01 RX ADMIN — POTASSIUM PHOSPHATE, MONOBASIC POTASSIUM PHOSPHATE, DIBASIC 83.33 MILLIMOLE(S): 236; 224 INJECTION, SOLUTION INTRAVENOUS at 01:31

## 2023-01-01 RX ADMIN — Medication 102 MILLIGRAM(S): at 12:11

## 2023-01-01 RX ADMIN — CEFEPIME 100 MILLIGRAM(S): 1 INJECTION, POWDER, FOR SOLUTION INTRAMUSCULAR; INTRAVENOUS at 05:10

## 2023-01-01 RX ADMIN — Medication 102 MILLIGRAM(S): at 15:48

## 2023-01-01 RX ADMIN — Medication 125 MILLILITER(S): at 12:11

## 2023-01-01 RX ADMIN — Medication 100 GRAM(S): at 21:19

## 2023-01-01 RX ADMIN — CHLORHEXIDINE GLUCONATE 1 APPLICATION(S): 213 SOLUTION TOPICAL at 05:00

## 2023-01-01 RX ADMIN — MIDAZOLAM HYDROCHLORIDE 4 MILLIGRAM(S): 1 INJECTION, SOLUTION INTRAMUSCULAR; INTRAVENOUS at 07:47

## 2023-01-01 RX ADMIN — HEPARIN SODIUM 5000 UNIT(S): 5000 INJECTION INTRAVENOUS; SUBCUTANEOUS at 17:41

## 2023-01-01 RX ADMIN — CHLORHEXIDINE GLUCONATE 15 MILLILITER(S): 213 SOLUTION TOPICAL at 17:41

## 2023-01-01 RX ADMIN — Medication 100 MILLIGRAM(S): at 13:44

## 2023-01-01 RX ADMIN — Medication 15 MILLILITER(S): at 11:43

## 2023-01-01 RX ADMIN — CHLORHEXIDINE GLUCONATE 1 APPLICATION(S): 213 SOLUTION TOPICAL at 05:45

## 2023-01-01 RX ADMIN — Medication 105 MILLIGRAM(S): at 21:26

## 2023-01-01 RX ADMIN — CHLORHEXIDINE GLUCONATE 15 MILLILITER(S): 213 SOLUTION TOPICAL at 17:22

## 2023-01-01 RX ADMIN — SODIUM CHLORIDE 75 MILLILITER(S): 9 INJECTION, SOLUTION INTRAVENOUS at 01:31

## 2023-01-01 RX ADMIN — Medication 15 MILLILITER(S): at 11:09

## 2023-01-01 RX ADMIN — Medication 102 MILLIGRAM(S): at 11:50

## 2023-01-01 RX ADMIN — SODIUM CHLORIDE 70 MILLILITER(S): 9 INJECTION, SOLUTION INTRAVENOUS at 09:53

## 2023-01-01 RX ADMIN — CHLORHEXIDINE GLUCONATE 1 APPLICATION(S): 213 SOLUTION TOPICAL at 05:23

## 2023-01-01 RX ADMIN — FAMOTIDINE 20 MILLIGRAM(S): 10 INJECTION INTRAVENOUS at 12:11

## 2023-01-01 RX ADMIN — SODIUM CHLORIDE 60 MILLILITER(S): 9 INJECTION, SOLUTION INTRAVENOUS at 13:44

## 2023-01-01 RX ADMIN — POTASSIUM PHOSPHATE, MONOBASIC POTASSIUM PHOSPHATE, DIBASIC 62.5 MILLIMOLE(S): 236; 224 INJECTION, SOLUTION INTRAVENOUS at 13:07

## 2023-01-01 RX ADMIN — Medication 102 MILLIGRAM(S): at 12:22

## 2023-01-01 RX ADMIN — HYDROMORPHONE HYDROCHLORIDE 0.5 MILLIGRAM(S): 2 INJECTION INTRAMUSCULAR; INTRAVENOUS; SUBCUTANEOUS at 13:07

## 2023-01-01 RX ADMIN — FAMOTIDINE 20 MILLIGRAM(S): 10 INJECTION INTRAVENOUS at 05:10

## 2023-01-01 RX ADMIN — Medication 25 GRAM(S): at 17:42

## 2023-01-01 RX ADMIN — CEFEPIME 100 MILLIGRAM(S): 1 INJECTION, POWDER, FOR SOLUTION INTRAMUSCULAR; INTRAVENOUS at 17:06

## 2023-01-01 RX ADMIN — CHLORHEXIDINE GLUCONATE 1 APPLICATION(S): 213 SOLUTION TOPICAL at 05:04

## 2023-01-01 RX ADMIN — ROBINUL 0.2 MILLIGRAM(S): 0.2 INJECTION INTRAMUSCULAR; INTRAVENOUS at 13:06

## 2023-01-01 RX ADMIN — Medication 2.5 MICROGRAM(S)/KG/MIN: at 06:27

## 2023-01-01 RX ADMIN — Medication 102 MILLIGRAM(S): at 12:40

## 2023-01-01 RX ADMIN — Medication 100 MILLIEQUIVALENT(S): at 11:21

## 2023-01-01 RX ADMIN — HEPARIN SODIUM 5000 UNIT(S): 5000 INJECTION INTRAVENOUS; SUBCUTANEOUS at 05:16

## 2023-01-01 RX ADMIN — CHLORHEXIDINE GLUCONATE 1 APPLICATION(S): 213 SOLUTION TOPICAL at 05:17

## 2023-01-01 RX ADMIN — Medication 2.5 MICROGRAM(S)/KG/MIN: at 17:42

## 2023-01-01 RX ADMIN — SODIUM CHLORIDE 75 MILLILITER(S): 9 INJECTION, SOLUTION INTRAVENOUS at 11:42

## 2023-01-01 RX ADMIN — HEPARIN SODIUM 5000 UNIT(S): 5000 INJECTION INTRAVENOUS; SUBCUTANEOUS at 17:52

## 2023-01-01 RX ADMIN — Medication 1 MILLIGRAM(S): at 13:06

## 2023-01-01 RX ADMIN — SODIUM CHLORIDE 500 MILLILITER(S): 9 INJECTION, SOLUTION INTRAVENOUS at 12:42

## 2023-01-01 RX ADMIN — Medication 200 GRAM(S): at 11:21

## 2023-01-01 RX ADMIN — HYDROMORPHONE HYDROCHLORIDE 0.5 MILLIGRAM(S): 2 INJECTION INTRAMUSCULAR; INTRAVENOUS; SUBCUTANEOUS at 13:38

## 2023-01-01 RX ADMIN — FENTANYL CITRATE 25 MICROGRAM(S): 50 INJECTION INTRAVENOUS at 08:50

## 2023-01-01 RX ADMIN — HEPARIN SODIUM 5000 UNIT(S): 5000 INJECTION INTRAVENOUS; SUBCUTANEOUS at 17:23

## 2023-01-01 RX ADMIN — CHLORHEXIDINE GLUCONATE 15 MILLILITER(S): 213 SOLUTION TOPICAL at 17:10

## 2023-01-01 RX ADMIN — FENTANYL CITRATE 25 MICROGRAM(S): 50 INJECTION INTRAVENOUS at 01:40

## 2023-01-01 RX ADMIN — Medication 102 MILLIGRAM(S): at 11:02

## 2023-01-01 RX ADMIN — Medication 100 MILLIEQUIVALENT(S): at 00:49

## 2023-01-01 RX ADMIN — FAMOTIDINE 20 MILLIGRAM(S): 10 INJECTION INTRAVENOUS at 17:41

## 2023-01-01 RX ADMIN — CHLORHEXIDINE GLUCONATE 1 APPLICATION(S): 213 SOLUTION TOPICAL at 05:16

## 2023-01-01 RX ADMIN — HEPARIN SODIUM 5000 UNIT(S): 5000 INJECTION INTRAVENOUS; SUBCUTANEOUS at 18:54

## 2023-01-01 RX ADMIN — CHLORHEXIDINE GLUCONATE 1 APPLICATION(S): 213 SOLUTION TOPICAL at 06:39

## 2023-01-01 RX ADMIN — PREGABALIN 1000 MICROGRAM(S): 225 CAPSULE ORAL at 12:11

## 2023-01-01 RX ADMIN — Medication 100 MILLIEQUIVALENT(S): at 23:19

## 2023-01-01 RX ADMIN — POTASSIUM PHOSPHATE, MONOBASIC POTASSIUM PHOSPHATE, DIBASIC 62.5 MILLIMOLE(S): 236; 224 INJECTION, SOLUTION INTRAVENOUS at 00:10

## 2023-01-01 RX ADMIN — Medication 256.56 MILLIGRAM(S): at 10:40

## 2023-01-01 RX ADMIN — Medication 15 MILLILITER(S): at 12:23

## 2023-01-01 RX ADMIN — SODIUM CHLORIDE 500 MILLILITER(S): 9 INJECTION, SOLUTION INTRAVENOUS at 14:40

## 2023-01-01 RX ADMIN — FAMOTIDINE 20 MILLIGRAM(S): 10 INJECTION INTRAVENOUS at 06:09

## 2023-01-01 RX ADMIN — CHLORHEXIDINE GLUCONATE 15 MILLILITER(S): 213 SOLUTION TOPICAL at 05:11

## 2023-01-01 RX ADMIN — Medication 25 GRAM(S): at 00:22

## 2023-01-01 RX ADMIN — Medication 100 GRAM(S): at 08:23

## 2023-01-01 RX ADMIN — PROPOFOL 3.2 MICROGRAM(S)/KG/MIN: 10 INJECTION, EMULSION INTRAVENOUS at 06:27

## 2023-01-01 RX ADMIN — Medication 100 GRAM(S): at 23:36

## 2023-01-01 RX ADMIN — SODIUM CHLORIDE 60 MILLILITER(S): 9 INJECTION, SOLUTION INTRAVENOUS at 00:53

## 2023-01-01 RX ADMIN — CHLORHEXIDINE GLUCONATE 15 MILLILITER(S): 213 SOLUTION TOPICAL at 06:10

## 2023-01-01 RX ADMIN — Medication 15 MILLILITER(S): at 12:24

## 2023-01-01 RX ADMIN — SODIUM CHLORIDE 60 MILLILITER(S): 9 INJECTION, SOLUTION INTRAVENOUS at 17:06

## 2023-01-01 RX ADMIN — CHLORHEXIDINE GLUCONATE 15 MILLILITER(S): 213 SOLUTION TOPICAL at 17:23

## 2023-01-01 RX ADMIN — CHLORHEXIDINE GLUCONATE 1 APPLICATION(S): 213 SOLUTION TOPICAL at 06:10

## 2023-01-01 RX ADMIN — PROPOFOL 3.2 MICROGRAM(S)/KG/MIN: 10 INJECTION, EMULSION INTRAVENOUS at 19:19

## 2023-01-01 RX ADMIN — Medication 100 MILLIEQUIVALENT(S): at 10:23

## 2023-01-01 RX ADMIN — Medication 4.04 MICROGRAM(S)/KG/MIN: at 07:05

## 2023-01-01 RX ADMIN — Medication 102 MILLIGRAM(S): at 11:09

## 2023-01-01 RX ADMIN — SODIUM CHLORIDE 60 MILLILITER(S): 9 INJECTION, SOLUTION INTRAVENOUS at 12:39

## 2023-01-01 RX ADMIN — CHLORHEXIDINE GLUCONATE 15 MILLILITER(S): 213 SOLUTION TOPICAL at 17:40

## 2023-01-01 RX ADMIN — SODIUM CHLORIDE 60 MILLILITER(S): 9 INJECTION, SOLUTION INTRAVENOUS at 12:26

## 2023-01-01 RX ADMIN — Medication 100 MILLIEQUIVALENT(S): at 15:59

## 2023-01-01 RX ADMIN — Medication 100 MILLIEQUIVALENT(S): at 22:38

## 2023-01-01 RX ADMIN — CHLORHEXIDINE GLUCONATE 1 APPLICATION(S): 213 SOLUTION TOPICAL at 05:55

## 2023-01-01 RX ADMIN — CEFEPIME 100 MILLIGRAM(S): 1 INJECTION, POWDER, FOR SOLUTION INTRAMUSCULAR; INTRAVENOUS at 17:41

## 2023-01-01 RX ADMIN — CEFEPIME 100 MILLIGRAM(S): 1 INJECTION, POWDER, FOR SOLUTION INTRAMUSCULAR; INTRAVENOUS at 06:09

## 2023-01-01 RX ADMIN — SODIUM CHLORIDE 2000 MILLILITER(S): 9 INJECTION, SOLUTION INTRAVENOUS at 15:47

## 2023-01-01 RX ADMIN — Medication 40 MILLIEQUIVALENT(S): at 23:36

## 2023-01-01 RX ADMIN — SODIUM CHLORIDE 60 MILLILITER(S): 9 INJECTION, SOLUTION INTRAVENOUS at 05:11

## 2023-01-01 RX ADMIN — SODIUM CHLORIDE 70 MILLILITER(S): 9 INJECTION, SOLUTION INTRAVENOUS at 08:08

## 2023-01-01 RX ADMIN — HEPARIN SODIUM 5000 UNIT(S): 5000 INJECTION INTRAVENOUS; SUBCUTANEOUS at 05:04

## 2023-01-01 RX ADMIN — Medication 102 MILLIGRAM(S): at 11:43

## 2023-01-01 RX ADMIN — PROPOFOL 1.6 MICROGRAM(S)/KG/MIN: 10 INJECTION, EMULSION INTRAVENOUS at 08:14

## 2023-01-01 RX ADMIN — Medication 18.5 MICROGRAM(S): at 21:26

## 2023-01-01 RX ADMIN — FAMOTIDINE 20 MILLIGRAM(S): 10 INJECTION INTRAVENOUS at 05:04

## 2023-01-01 RX ADMIN — Medication 40 MILLIEQUIVALENT(S): at 20:08

## 2023-01-01 RX ADMIN — Medication 100 MILLIEQUIVALENT(S): at 14:55

## 2023-01-01 RX ADMIN — CHLORHEXIDINE GLUCONATE 15 MILLILITER(S): 213 SOLUTION TOPICAL at 17:06

## 2023-01-01 RX ADMIN — Medication 40 MILLIEQUIVALENT(S): at 18:05

## 2023-01-01 RX ADMIN — MIDAZOLAM HYDROCHLORIDE 2 MILLIGRAM(S): 1 INJECTION, SOLUTION INTRAMUSCULAR; INTRAVENOUS at 09:54

## 2023-01-01 RX ADMIN — Medication 100 GRAM(S): at 08:50

## 2023-01-01 RX ADMIN — CHLORHEXIDINE GLUCONATE 15 MILLILITER(S): 213 SOLUTION TOPICAL at 05:49

## 2023-01-01 RX ADMIN — MIDAZOLAM HYDROCHLORIDE 1 MILLIGRAM(S): 1 INJECTION, SOLUTION INTRAMUSCULAR; INTRAVENOUS at 14:20

## 2023-01-01 RX ADMIN — Medication 102 MILLIGRAM(S): at 12:10

## 2023-01-01 RX ADMIN — Medication 2.5 MICROGRAM(S)/KG/MIN: at 02:25

## 2023-01-01 RX ADMIN — Medication 100 MILLIGRAM(S): at 17:41

## 2023-01-01 RX ADMIN — Medication 100 MILLIEQUIVALENT(S): at 17:30

## 2023-01-01 RX ADMIN — FENTANYL CITRATE 50 MICROGRAM(S): 50 INJECTION INTRAVENOUS at 10:00

## 2023-01-01 RX ADMIN — Medication 2.5 MICROGRAM(S)/KG/MIN: at 06:34

## 2023-01-01 RX ADMIN — CHLORHEXIDINE GLUCONATE 15 MILLILITER(S): 213 SOLUTION TOPICAL at 05:00

## 2023-01-01 RX ADMIN — Medication 20 MILLIEQUIVALENT(S): at 21:43

## 2023-01-01 RX ADMIN — Medication 2.5 MICROGRAM(S)/KG/MIN: at 13:27

## 2023-01-01 RX ADMIN — FENTANYL CITRATE 25 MICROGRAM(S): 50 INJECTION INTRAVENOUS at 01:55

## 2023-01-01 RX ADMIN — Medication 2.5 MICROGRAM(S)/KG/MIN: at 03:42

## 2023-01-01 RX ADMIN — FENTANYL CITRATE 50 MICROGRAM(S): 50 INJECTION INTRAVENOUS at 10:32

## 2023-01-01 RX ADMIN — POTASSIUM PHOSPHATE, MONOBASIC POTASSIUM PHOSPHATE, DIBASIC 62.5 MILLIMOLE(S): 236; 224 INJECTION, SOLUTION INTRAVENOUS at 18:55

## 2023-08-24 NOTE — ED PROVIDER NOTE - CARE PLAN
Principal Discharge DX:	Distal radius fracture, left   1 Principal Discharge DX:	Distal radius fracture, left  Secondary Diagnosis:	Anemia  Secondary Diagnosis:	Hyponatremia  Secondary Diagnosis:	Hypokalemia  Secondary Diagnosis:	Hypocalcemia  Secondary Diagnosis:	Hypothyroidism  Secondary Diagnosis:	Elevated brain natriuretic peptide (BNP) level

## 2023-08-24 NOTE — CONSULT NOTE ADULT - SUBJECTIVE AND OBJECTIVE BOX
HPI:  This is a 52 y/o F  seen in the ED for left wrist Fx    PAST MEDICAL HISTORY:  PAST MEDICAL & SURGICAL HISTORY:  Anemia  Depression  Chronic musculoskeletal pain  Anorexia  at age 15 yrs  No significant past surgical history          PAST SURGICAL HISTORY:    REVIEW OF SYSTEMS:  General/Constitutional: No acute distress, no headache, weakness, fevers, or chills   HEENT: Denies auditory or visual changes/disturbances, no vertigo, no throat pain, no dysphagia    Neck: Denies neck pain/stiffness, denies swelling/lumps/hoarseness   Lymphatic: Denies lumps or swelling in the axillae, groin, or neck bilaterally   Respiratory: Denies cough/hemoptysis, denies wheezing/SOB/dyspnea  Cardiac: Denies chest pain, palpitations  Abdomen: Denies abdominal bloating/fullness, nausea or vomiting, denies abdominal pain  Genitourinary: Denies urinary issues or complaints, denies dysuria/hematuria  Neuro: Denies weakness, paraesthesias, paralysis, syncope.  Skin: Denies pruritus, pain, rashes  Psych: Denies hallucinations, visual disturbances, or depression    MEDICATIONS:  Home Medications:  acetaminophen-oxyCODONE 325 mg-5 mg oral tablet: 2 tab(s) orally every 4 hours, As needed, Severe Pain (21 May 2015 11:38)  acetaminophen-oxyCODONE 325 mg-5 mg oral tablet: 1 tab(s) orally every 4 hours, As needed, Moderate Pain (21 May 2015 11:38)  Actamin 325 mg oral tablet: 2 tab(s) orally every 6 hours, As needed, Moderate Pain (21 May 2015 11:38)  freetext medication  -: 500 cap(s) orally 2 times a day (21 May 2015 11:38)  Metamucil [facility] Fiber 3.4 g/7 g oral powder for reconstitution: 1 packet(s) orally 3 times a day (21 May 2015 11:38)  multivitamin: 1 tab(s) orally once a day (21 May 2015 11:38)    MEDICATIONS  (STANDING):  diphtheria/tetanus/pertussis (acellular) Vaccine (Adacel) 0.5 milliLiter(s) IntraMuscular once    MEDICATIONS  (PRN):      ALLERGIES:  Allergies    No Known Allergies    Intolerances        SOCIAL HISTORY:  Social History:    Smoking: Yes [ ]  No [ X]   ______pk yrs  ETOH  Yes [ ]  No [X ]  Social [ ]  DRUGS:  Yes [ ]  No [X ]  if so what______________    FAMILY HISTORY:  FAMILY HISTORY:  No pertinent family history in first degree relatives        VITAL SIGNS:  Vital Signs Last 24 Hrs  T(C): 36.3 (24 Aug 2023 13:35), Max: 36.3 (24 Aug 2023 13:35)  T(F): 97.3 (24 Aug 2023 13:35), Max: 97.3 (24 Aug 2023 13:35)  HR: 59 (24 Aug 2023 13:35) (59 - 59)  BP: 146/98 (24 Aug 2023 13:35) (146/98 - 146/98)  BP(mean): --  RR: 16 (24 Aug 2023 13:35) (16 - 16)  SpO2: 99% (24 Aug 2023 13:35) (99% - 99%)    Parameters below as of 24 Aug 2023 13:35  Patient On (Oxygen Delivery Method): room air        PHYSICAL EXAM:  AAO x3. No acute distress, appears comfortable, well-groomed, appears stated age  Palpable radial pulses bilaterally  Sensory intact throughout ulnar, radial and median nerve distributions, bilaterally  Bilat UE motor intact, 5/5 strength   pain with palpation to left DR  satisfactory ROM      RADIOLOGIC STUDIES: Xrays show comminuted, slightly displaced left distal radius Fx    ASSESSMENT: left wrist Fx    PLAN:  -splint applied  -Keep splint/dressing clean and dry.   -ICE and elevate  -Do not remove dressing/splint until follow up in the office.   -Follow up with Dr. Watkins within 1 week. Please call the office to make an appointment.

## 2023-08-24 NOTE — ED PROVIDER NOTE - NSFOLLOWUPINSTRUCTIONS_ED_ALL_ED_FT
Keep the splint/dressing clean and dry.     Apply ICE and elevate the left arm.    Do not remove dressing/splint until your follow-up appointment in the office.    Follow-up with Dr. Watkins within 1 week. Please call the office to make an appointment. His contact information is included below.    You can use 500-1000mg Tylenol every 6 hours for pain - as needed.  This is an over-the-counter medications - please respect the warnings on the label. This medication come with certain risks and side effects that you need to discuss with your doctor, especially if you are taking it for a prolonged period.    You can use 400-600mg Ibuprofen (such as motrin or advil) every 6 to 8 hours as needed for pain control.  Take ibuprofen with food or milk to lessen stomach upset.  This is an over-the-counter medication please respect the warnings on the label. All medications come with certain risks and side effects that you need to discuss with your doctor, especially if you are taking them for a prolonged period.    You have multiple abnormalities on your blood work including anemia (low hemoglobin), low white blood cell count, low sodium, low potassium, low calcium, low thyroid hormone, elevated BNP (which could indicate heart failure).    You need to follow-up with the orthopedic surgeon, primary care doctor, hematologist, endocrinologist, and cardiologist. Their contact information is included below. Please call to schedule an appointment as soon as possible.    Anemia    Anemia is a condition in which the concentration of red blood cells or hemoglobin in the blood is below normal. Hemoglobin is a substance in red blood cells that carries oxygen to the tissues of the body. Anemia results in not enough oxygen reaching these tissues which can cause symptoms such as weakness, dizziness/lightheadedness, shortness of breath, chest pain, paleness, or nausea. The cause of your anemia may or may not be determined immediately. If your hemoglobin was dangerously low, you may have received a blood transfusion. Usually reactions to transfusions occur immediately but monitor yourself for any fevers, rash, or shortness of breath.    SEEK IMMEDIATE MEDICAL CARE IF YOU HAVE ANY OF THE FOLLOWING SYMPTOMS: extreme weakness/chest pain/shortness of breath, black or bloody stools, vomiting blood, fainting, fever, or any signs of dehydration.    Syncope    Syncope is when you temporarily lose consciousness, also called fainting or passing out. It is caused by a sudden decrease in blood flow to the brain. Even though most causes of syncope are not dangerous, syncope can possibly be a sign of a serious medical problem. Signs that you may be about to faint include feeling dizzy, lightheaded, nausea, visual changes, or cold/clammy skin. Do not drive, operate heavy machinery, or play sports until your health care provider says it is okay.    SEEK IMMEDIATE MEDICAL CARE IF YOU HAVE ANY OF THE FOLLOWING SYMPTOMS: severe headache, pain in your chest/abdomen/back, bleeding from your mouth or rectum, palpitations, shortness of breath, pain with breathing, seizure, confusion, or trouble walking.    Shortness of breath    Shortness of breath (dyspnea) means you have trouble breathing and could indicate a medical problem. Causes include lung disease, heart disease, low amount of red blood cells (anemia), poor physical fitness, being overweight, smoking, etc. Your health care provider today may not be able to find a cause for your shortness of breath after your exam. In this case, it is important to have a follow-up exam with your primary care physician as instructed. If medicines were prescribed, take them as directed for the full length of time directed. Refrain from tobacco products.    SEEK IMMEDIATE MEDICAL CARE IF YOU HAVE ANY OF THE FOLLOWING SYMPTOMS: worsening shortness of breath, chest pain, back pain, abdominal pain, fever, coughing up blood, lightheadedness/dizziness.    Chest Pain    Chest pain can be caused by many different conditions which may or may not be dangerous. Causes include heartburn, lung infections, heart attack, blood clot in lungs, skin infections, strain or damage to muscle, cartilage, or bones, etc. In addition to a history and physical examination, an electrocardiogram (ECG) or other lab tests may have been performed to determine the cause of your chest pain. Follow up with your primary care provider or with a cardiologist as instructed.     SEEK IMMEDIATE MEDICAL CARE IF YOU HAVE ANY OF THE FOLLOWING SYMPTOMS: worsening chest pain, coughing up blood, unexplained back/neck/jaw pain, severe abdominal pain, dizziness or lightheadedness, fainting, shortness of breath, sweaty or clammy skin, vomiting, or racing heart beat. These symptoms may represent a serious problem that is an emergency. Do not wait to see if the symptoms will go away. Get medical help right away. Call 911 and do not drive yourself to the hospital.    Fall Prevention:     Fall prevention includes ways to make your home and other areas safer. It also includes ways you can move more carefully to prevent a fall. Health conditions that cause changes in your blood pressure, vision, or muscle strength and coordination may increase your risk for falls. Medicines may also increase your risk for falls if they make you dizzy, weak, or sleepy.    DISCHARGE INSTRUCTIONS:  Call 911 or have someone else call if:   •You have fallen and are unconscious.  •You have fallen and cannot move part of your body.    Contact your healthcare provider if:   •You have fallen and have pain or a headache.  •You have questions or concerns about your condition or care.    Fall prevention tips:   •Stand or sit up slowly. This may help you keep your balance and prevent falls.  •Use assistive devices as directed. Your healthcare provider may suggest that you use a cane or walker to help you keep your balance. You may need to have grab bars put in your bathroom near the toilet or in the shower.  •Wear shoes that fit well and have soles that . Wear shoes both inside and outside. Use slippers with good . Do not wear shoes with high heels.  •Wear a personal alarm. This is a device that allows you to call 911 if you fall and need help. Ask your healthcare provider for more information  •Stay active. Exercise can help strengthen your muscles and improve your balance. Your healthcare provider may recommend water aerobics or walking. He or she may also recommend physical therapy to improve your coordination. Never start an exercise program without talking to your healthcare provider first.  Walking for Exercise  •Manage your medical conditions.  Keep all appointments with your healthcare providers. Visit your eye doctor as directed.    Home safety tips:   •Add items to prevent falls in the bathroom. Put nonslip strips on your bath or shower floor to prevent you from slipping. Use a bath mat if you do not have carpet in the bathroom. This will prevent you from falling when you step out of the bath or shower. Use a shower seat so you do not need to stand while you shower. Sit on the toilet or a chair in your bathroom to dry yourself and put on clothing. This will prevent you from losing your balance from drying or dressing yourself while you are standing.  •Keep paths clear. Remove books, shoes, and other objects from walkways and stairs. Place cords for telephones and lamps out of the way so that you do not need to walk over them. Tape them down if you cannot move them. Remove small rugs. If you cannot remove a rug, secure it with double-sided tape. This will prevent you from tripping.  •Install bright lights in your home. Use night lights to help light paths to the bathroom or kitchen. Always turn on the light before you start walking.  •Keep items you use often on shelves within reach. Do not use a step stool to help you reach an item.  •Paint or place reflective tape on the edges of your stairs. This will help you see the stairs better.

## 2023-08-24 NOTE — ED ADULT NURSE REASSESSMENT NOTE - NS ED NURSE REASSESS COMMENT FT1
at bedside explaining risk for leaving AMA, Pt verbalizing understanding of risks and still requesting to leave AMA.  PT a/ox4, no signs of acute distress noted.

## 2023-08-24 NOTE — ED PROVIDER NOTE - PROGRESS NOTE DETAILS
Shen Butcher MD (Attending Physician): Pt was re-evaluated at bedside. Pt appears to be a fall risk while ambulating and has multiple abnormalities on her blood-work. Ortho splinted LUE for distal radius fracture. Told pt that she should be admitted for being a fall risk and having multiple abnormalities on her blood-work, but pt is refusing, saying that she would rather go home. Pt did not give urine, refusing EKG as well. Pt has full capacity and expressed understanding of risks going home. ED team went through all aspects of her blood-work and imaging. Instructed pt to follow-up with multiple specialists, and pt is agreeable. Pt arranged ride for transport back home. Pt signed AMA paper-work. Results were discussed with patient as well as return precautions and follow up plan with PCP and multiple specialists. Time was taken to answer any questions that the patient had before providing them with discharge paperwork.

## 2023-08-24 NOTE — ED ADULT NURSE NOTE - NSFALLHARMRISKINTERV_ED_ALL_ED

## 2023-08-24 NOTE — ED ADULT NURSE NOTE - OBJECTIVE STATEMENT
Patient states she fell yesterday while at physical therapy. Pt states she feels pain to pelvis and R arm. Pt endorses she has chronic conditions in lumbar and upper back, also that she is anemia which causes the increased bruising. No LOC, no head strike

## 2023-08-24 NOTE — ED PROVIDER NOTE - CARE PROVIDER_API CALL
Yevgeniy Watkins  Orthopaedic Surgery  [address] [place] [address]  Phone: [phone]  Fax: [phone]  Follow Up Time: 1-3 Days    Vivek Culp  Cardiovascular Disease  [address], [place] [id number]  Phone: [phone]  Fax: [phone]  Follow Up Time: 1-3 Days

## 2023-08-24 NOTE — ED PROVIDER NOTE - PHYSICAL EXAMINATION
GEN - Appears cachectic, A&Ox3  HEAD - NC/AT  EYES - PERRL, EOMI  ENT - Airway patent, mucous membranes dry  NECK - Supple, non-tender without lymphadenopathy, no masses  PULMONARY - CTA b/l, symmetric breath sounds, no W/R/R  CHEST - Ribs nontender b/l  CARDIAC - +S1S2, RRR, no M/G/R, no JVD  ABDOMEN - +BS, ND, mildly TTP diffusely, soft, no guarding, no rebound, no masses, no rigidity  PELVIS - Stable, nontender   - No CVA TTP  BACK - Diffuse midline TTP in thoracic and lumbar spines  EXTREMITIES - Scattered abrasions over LUE from elbow to hand, as well as from L hip to distal L tib/fib area. No lacerations. Ecchymosis, swelling, and TTP over L wrist with decreased passive/active ROM of L wrist. Symmetric pulses diffusely, trace edema in b/l ankles.  NEUROLOGIC - Alert, speech clear, no focal deficits, CN II-XII grossly intact, unable to assess gait at this time, strength and sensation grossly intact, FTN negative b/l  PSYCH - Anxious mood/affect, normal insight

## 2023-08-24 NOTE — ED PROVIDER NOTE - OBJECTIVE STATEMENT
The patient is a 53y Female with pmhx of anemia, hypothyroidism, osteoporosis, chronic hyponatremia, depression/anxiety states she fell yesterday while at physical therapy. Pt is not the best historian. Says she's not sure why she fell, thinks that her legs gave out. Says she fell onto her left side, suffering abrasions/bruising to LUE and LLE. Pt doesn't think she hit head or lost consciousness. Not on blood thinners. Says that her L wrist is swollen, painful to touch, also has pain with movement of L wrist. Also endorsing L pelvic pain. Unsure of last tetanus shot. Says that after fall, one of the staff members helped her up and she was able to ambulate. Doesn't use any assistive devices for ambulation, but thinks shes been more unsteady the last few weeks. Has a HHA a few days a week for a few hours per day. Endorses chronic back pain from spinal compression fractures from her osteoporosis. Says her ankles occasionally swell up. Denies fever, cp, sob, abd pain, n/v/d, urinary symptoms, GI bleed. NKDA.

## 2023-08-24 NOTE — ED ADULT NURSE REASSESSMENT NOTE - NSFALLUNIVINTERV_ED_ALL_ED
Bed/Stretcher in lowest position, wheels locked, appropriate side rails in place/Call bell, personal items and telephone in reach/Instruct patient to call for assistance before getting out of bed/chair/stretcher/Non-slip footwear applied when patient is off stretcher/[place] to call system/Physically safe environment - no spills, clutter or unnecessary equipment/Purposeful proactive rounding/Room/bathroom lighting operational, light cord in reach

## 2023-08-24 NOTE — ED PROVIDER NOTE - PATIENT PORTAL LINK FT
You can access the FollowMyHealth Patient Portal offered by [facility] by registering at the following website: [email or web address]. By joining [employer] [facility]’s FollowMyHealth portal, you will also be able to view your health information using other applications (apps) compatible with our system.

## 2023-08-24 NOTE — ED ADULT NURSE NOTE - BREATHING, MLM
Next Depo is due between August 10 th & August 24 th, 2018.  
Spontaneous, unlabored and symmetrical

## 2023-08-24 NOTE — ED PROVIDER NOTE - SECONDARY DIAGNOSIS.
Hypoglycemia Hypokalemia Hypothyroidism Hyponatremia Anemia Elevated brain natriuretic peptide (BNP) level Hypocalcemia

## 2023-08-24 NOTE — ED PROVIDER NOTE - NSICDXPASTMEDICALHX_GEN_ALL_CORE_FT
PAST MEDICAL HISTORY:  Anemia     Anorexia at age 15 yrs    Chronic musculoskeletal pain     Depression

## 2023-08-24 NOTE — ED PROVIDER NOTE - PROVIDER TOKENS
PROVIDER:[TOKEN:[[id number]],FOLLOWUP:[1-3 Days]],PROVIDER:[TOKEN:[[id number]],FOLLOWUP:[1-3 Days]]

## 2023-08-24 NOTE — ED PROVIDER NOTE - NSFOLLOWUPCLINICS_GEN_ALL_ED_FT
[facility]  Endocrinology  [address] [place] [address]  Phone: [phone]  Fax:     [facility]  Hematology/Oncology  [address] [place] [id number]  Phone: [phone]  Fax:

## 2023-08-24 NOTE — ED PROVIDER NOTE - CLINICAL SUMMARY MEDICAL DECISION MAKING FREE TEXT BOX
Shen Butcher MD (Attending Physician): Shen Butcher MD (Attending Physician): The patient is a 53y Female with pmhx of anemia, hypothyroidism, osteoporosis, chronic hyponatremia, depression/anxiety states she fell yesterday while at physical therapy. DDx includes, but not limited to: L distal radius fracture, intracranial bleed, spinal fractures, rib fractures, pelvic fractures, LLE fractures, electrolyte derangement, anemia, UTI, ACS, CHF, hypothyroidism, hypoglycemia. ekg, cxr, x-ray pelvis, x-rays LUE, x-rays LLE, pan-CT scans, pain control, labs, urine, tetanus shot, IVF. Dispo pending w/u.

## 2023-08-27 NOTE — PATIENT PROFILE ADULT - HAVE YOU EXPERIENCED VIOLENCE OR A TRAUMATIC EVENT?
no

## 2023-08-27 NOTE — ED PROVIDER NOTE - CONSIDERATION OF ADMISSION OBSERVATION
consider admission due to weakness and frequent falls. not safe discharge Counseling Text: I reviewed the side effect in detail. Patient should get monthly blood tests, not donate blood, not drive at night if vision affected, and not share medication. Consideration of Admission/Observation

## 2023-08-27 NOTE — H&P ADULT - PROBLEM SELECTOR PLAN 1
on arrival, WBC 2.5, Hgb 7.0, plt 123. normocytic. concern for malnutrition    - trend CBC  - f/u iron studies  - f/u B12, folate  - maintain active type and screen, transfuse for Hgb < 7 On arrival, WBC 2.5, Hgb 7.0, plt 123. normocytic. concern for malnutrition    - encourage PO intake  - trend CBC  - f/u iron studies  - f/u B12, folate  - maintain active type and screen, transfuse for Hgb < 7 On arrival, WBC 2.5, Hgb 7.0, plt 123. normocytic. concern for malnutrition    - encourage PO intake  - trend CBC  - f/u iron studies  - f/u B12, folate  -1 u PRBC ordered  - maintain active type and screen, transfuse for Hgb < 7 On arrival, WBC 2.5, Hgb 7.0, plt 123. normocytic. concern for malnutrition    - encourage PO intake  - trend CBC  - f/u iron studies  - f/u B12, folate  -1 u PRBC ordered  - maintain active type and screen, transfuse for Hgb < 7  -hem onc dr Schultz consulted

## 2023-08-27 NOTE — H&P ADULT - PROBLEM SELECTOR PLAN 2
on arrival, Na 129, K 2.7, Ca 7.2.     - encourage PO intake  - replete as necessary on arrival, Na 129, K 2.7, Ca 7.2.     - encourage PO intake  - replete as necessary, K  - f/u BMP, Vitamin D, PTH, Mag on arrival, Na 129, K 2.7, Ca 7.2.     - encourage PO intake  - replete as necessary, K  - f/u BMP, Vitamin D, PTH, Mag  -nephro consulted

## 2023-08-27 NOTE — ED BEHAVIORAL HEALTH ASSESSMENT NOTE - RISK ASSESSMENT
Chronic risk factors: psych hx including eating disorder, Axis II; long hx of noncompliance/help rejecting; severe physical debility and medical conditions leading to being disabled at an early age/impacts quality of life.  Protective factors: age < 65 yrs old, female gender, no known hx of substance abuse or aggression, stable domicile, strong family support; access to health services. Acute risk factors identified: see above

## 2023-08-27 NOTE — ED PROVIDER NOTE - CARE PLAN
1 Principal Discharge DX:	Adult failure to thrive  Secondary Diagnosis:	Anemia  Secondary Diagnosis:	Hypokalemia  Secondary Diagnosis:	Hyponatremia

## 2023-08-27 NOTE — CONSULT NOTE ADULT - SUBJECTIVE AND OBJECTIVE BOX
Patient is a 53y old  Female who presents with a chief complaint of fall, pancytopenia (27 Aug 2023 17:14)       HPI:  52yo F with PMH anemia, hypothyroidism, osteoporosis, chronic hyponatremia, depression, anxiety presents to ED on 23 due to weakness and hypoglycemia.  Patient was seen at this emergency room  ago for a fall at physical therapy .  Was diagnosed with left wrist fracture at that time.  Splint was applied and seen by orthopedics.  Recommended admission due to fall risk and unsafe discharge.  Patient refused admission and left AMA.  Patient was fully aware of  fall risk and all lab abnormalities.  States she wanted to follow-up outpatient.  Today patient became weak and aide assisted her down to the floor.  Denies hitting head or any injuries from the fall because aide assisted her.  EMS was called.  Sugar on arrival per EMS was 48 and given D10.  Sugar on arrival to emergency room was 171.  Patient denies any pain or complaints.  Denies any injuries from the fall.  At this time patient is refusing any treatment or test.  States she just wants to go home and eat.  States her sugar was low because she did not eat today but she says she will eat when she gets home. Patient removed splint that was placed 2 days ago and was not wearing splint today per EMS. Patient's sister reports that she has been getting worse for the last 6 months and has become increasingly more frail. Patient lives alone with aides as per sister. Patient has ritualistic eating habits and a diet which includes eggs, fish, and vegetables. She currently takes no medications.   Renal consulted for multiple electrolyte abnormalities.  She states she wants to urinate.  Denies any N/V/SOB.  Eating. States she had edema.          PAST MEDICAL & SURGICAL HISTORY:  Anemia      Depression      Chronic musculoskeletal pain      Anorexia  at age 15 yrs      No significant past surgical history           FAMILY HISTORY:  No pertinent family history in first degree relatives    [place]    Social History:Non smoker    MEDICATIONS  (STANDING):  dextrose 50% Injectable 25 Gram(s) IV Push once  dextrose 50% Injectable 12.5 Gram(s) IV Push once  dextrose 50% Injectable 25 Gram(s) IV Push once  dextrose Oral Gel 15 Gram(s) Oral once  glucagon  Injectable 1 milliGRAM(s) IntraMuscular once  levothyroxine 25 MICROGram(s) Oral daily  potassium chloride    Tablet ER 40 milliEquivalent(s) Oral once    MEDICATIONS  (PRN):  melatonin 3 milliGRAM(s) Oral at bedtime PRN Insomnia   Meds reviewed    Allergies    No Known Allergies    Intolerances         REVIEW OF SYSTEMS:    Review of Systems:   Constitutional: weakness  HEENT: Denies headaches and dizziness  Respiratory: denies SOB, cough, or wheezing  Cardiovascular: denies CP, palpitations  Gastrointestinal: Denies nausea, denies vomiting, diarrhea, constipation, abdominal pain, or bloody stools  Genitourinary: denies painful urination, increased frequency, urgency, or bloody urine  Skin: denies rashes or itching  Musculoskeletal: denies muscle aches, joint swelling  Neurologic: Denies generalized weakness, denies loss of sensation, numbness, or tingling      Vital Signs Last 24 Hrs  T(C): 36.7 (27 Aug 2023 11:08), Max: 36.7 (27 Aug 2023 11:08)  T(F): 98.1 (27 Aug 2023 11:08), Max: 98.1 (27 Aug 2023 11:08)  HR: 84 (27 Aug 2023 11:08) (84 - 84)  BP: 125/87 (27 Aug 2023 11:08) (125/87 - 125/87)  BP(mean): --  RR: 20 (27 Aug 2023 11:08) (20 - 20)  SpO2: 100% (27 Aug 2023 11:08) (100% - 100%)    Parameters below as of 27 Aug 2023 11:08  Patient On (Oxygen Delivery Method): room air      Daily Height in cm: 132.08 (27 Aug 2023 11:08)    Daily     PHYSICAL EXAM:    GENERAL: cachectic, fraill appearing  HEAD:  Atraumatic, Normocephalic  EYES: EOMI, conjunctiva and sclera clear  ENMT: No Drainage from nares, No drainage from ears  NECK: Supple, neck  veins full  NERVOUS SYSTEM:  Awake and Alert  CHEST/LUNG: Clear to percussion bilaterally; No rales, rhonchi, wheezing, or rubs  HEART: Regular rate and rhythm; No murmurs, rubs, or gallops  ABDOMEN: Soft, Nontender, Nondistended; Bowel sounds present  EXTREMITIES:  No Edema  SKIN: + Ecchoymosis LE      LABS:                        6.9    x     )-----------( x        ( 27 Aug 2023 18:14 )             18.7     08    129<L>  |  98  |  55<H>  ----------------------------<  209<H>  2.7<LL>   |  25  |  0.53    Ca    7.2<L>      27 Aug 2023 12:20  Phos  3.1       Mg     2.0         TPro  6.1  /  Alb  3.5  /  TBili  0.9  /  DBili  x   /  AST  142<H>  /  ALT  90<H>  /  AlkPhos  59        Urinalysis Basic - ( 27 Aug 2023 13:20 )    Color: Yellow / Appearance: Clear / S.009 / pH: x  Gluc: x / Ketone: Negative mg/dL  / Bili: Negative / Urobili: 0.2 mg/dL   Blood: x / Protein: Negative mg/dL / Nitrite: Negative   Leuk Esterase: Trace / RBC: 0 /HPF / WBC 2 /HPF   Sq Epi: x / Non Sq Epi: x / Bacteria: x      Phosphorus: 3.1 mg/dL ( @ 18:14)  Magnesium: 2.0 mg/dL ( @ 18:14)          RADIOLOGY & ADDITIONAL TESTS:

## 2023-08-27 NOTE — ED PROVIDER NOTE - PROGRESS NOTE DETAILS
Patient stable.  Potassium low.  Potassium pills given in emergency room.  Patient continues to request to leave AGAINST MEDICAL ADVICE.  Will consult telepsych to evaluate for patient's capacity to make decisions to leave AGAINST MEDICAL ADVICE.  Telemetry psych made aware of patient and will consult. Telemetry psych consulted.  Reports patient does not have capacity to leave AGAINST MEDICAL ADVICE.  will plan to admit.  Spoke with Dr. Arthur Montilla, kindly accepts patient for admission

## 2023-08-27 NOTE — H&P ADULT - PROBLEM SELECTOR PLAN 8
behavioral health consulted, recorded history of schizoid personality disorder, anorexia, and hospitalization over 20 years ago    - f/u psych recs

## 2023-08-27 NOTE — ED PROVIDER NOTE - ATTENDING APP SHARED VISIT CONTRIBUTION OF CARE
Shen Butcher MD (Attending Physician):    I performed a history and physical exam of the patient and discussed their management with the ANALY. I have reviewed the ANALY note and agree with the documented findings and plan of care, except as noted. This was a shared visit with an ANALY. I reviewed and verified the documentation and independently performed my own history/exam/medical decision making. My medical decision making and observations are found below. Please refer to any progress notes for updates on clinical course.    HPI:  53-year-old female with history of anemia, hypothyroidism, osteoporosis, chronic hyponatremia, depression, anxiety brought in by ambulance for evaluation after weakness and hypoglycemia.  Patient was seen at this emergency room 2 days ago for a fall at physical therapy the day before.  Was diagnosed with left wrist fracture at that time.  Splint was applied and seen by orthopedics. Recommended admission at that time since patient was thought to be a fall risk and not a safe discharge.  Patient left AMA and refused admission.  Patient was fully aware that she was a fall risk and all lab abnormalities.  States she wanted to follow-up outpatient.  Today patient became weak and aide assisted her down to the floor.  Denies hitting head or any injuries from the fall because aide assisted her.  EMS was called.  Sugar on arrival per EMS was 48 and given D10.  Sugar on arrival to emergency room was 171.  Patient denies any pain or complaints.  Denies any injuries from the fall.  At this time patient is refusing any treatment or test.  States she just wants to go home and eat.  States her sugar was low because she did not eat today but she says she will eat when she gets home. patient removed splint that was placed 2 days ago and was not wearing splint today per EMS. No current PCP.    PE:  GEN - Cachectic, chronically ill-appearing, A&Ox3  HEAD - NC/AT  EYES - PERRL, EOMI  ENT - Airway patent, mucous membranes dry  NECK - Supple, non-tender without lymphadenopathy, no masses  PULMONARY - CTA b/l, symmetric breath sounds, no W/R/R  CARDIAC - +S1S2, RRR, no M/G/R, no JVD  ABDOMEN - +BS, ND, NT, soft, no guarding, no rebound, no masses, no rigidity   - No CVA TTP  BACK - Diffuse midline TTP in thoracic and lumbar spines (baseline per pt)  EXTREMITIES - L wrist swelling, ecchymosis, and TTP (from prior diagnosed fracture). Symmetric pulses, no edema. Superficial skin tear to L forearm.  NEUROLOGIC - Alert, speech clear, no focal deficits  PSYCH - Normal mood/affect, normal insight    MDM:  DDx includes, but not limited to: L distal radius fracture, electrolyte derangement, anemia, UTI, ACS, CHF, PNA, hypothyroidism, hypoglycemia, FTT. Pt may not have full capacity about her medical problems. Will attempt to get additional collateral from aide and family. ekg, cxr, x-ray LUE, pain control, labs, urine, IVF, psych consult. Dispo pending w/u.

## 2023-08-27 NOTE — ED BEHAVIORAL HEALTH ASSESSMENT NOTE - DESCRIPTION
potassium chloride PO + IV see HPI anemia (could not get bone marrow biopsy due to osteopenia), hypothyroidism, osteoporosis, sarcopenia, chronic hyponatremia, constipation, LE edema, LE wound, amenorrhea (only got period once in life), anorexia at age 15, hx of mechanical falls since 2015 with Right clavicle fx, Right greater trochanter fracture in 2015, 8/24/23 comminuted, slightly displaced left distal radius F

## 2023-08-27 NOTE — PATIENT PROFILE ADULT - FUNCTIONAL ASSESSMENT - BASIC MOBILITY 6.
1-calculated by average/Not able to assess (calculate score using [facility] averaging method)

## 2023-08-27 NOTE — H&P ADULT - PROBLEM SELECTOR PLAN 4
fall on 8/23, presented to ED on 8/24 and diagnosed with fracture. xray wrist 8/27 :There is a comminuted large horizontal fracture of the distal radius possibly with extension to the inner endplate. There is mild dorsal indentation distal fracture. pt given splint on previous ED visit    - f/u ortho recs Fall on 8/23, presented to ED on 8/24 and diagnosed with fracture. Xray wrist 8/27 :There is a comminuted large horizontal fracture of the distal radius possibly with extension to the inner endplate. There is mild dorsal indentation distal fracture. pt given splint on previous ED visit    - splint ordered for left wrist  - ortho recommended split and f/u outpatient ortho on 8/24

## 2023-08-27 NOTE — ED BEHAVIORAL HEALTH ASSESSMENT NOTE - OTHER PAST PSYCHIATRIC HISTORY (INCLUDE DETAILS REGARDING ONSET, COURSE OF ILLNESS, INPATIENT/OUTPATIENT TREATMENT)
-history of Schizoid personality and anorexia, has been hospitalized at mental institution over 20 years ago.  Sister believes this is why patient refuses hospital admissions or medical care because she is scared about being hospitalized in mental institution.    - -history of Schizoid personality and anorexia at age 15, has been hospitalized at mental institution over 20 years ago.  Sister believes this is why patient refuses hospital admissions or medical care because she is scared about being hospitalized in mental institution.    - -history of Schizoid personality and anorexia at age 15, has been hospitalized at mental institution over 20 years ago.  Sister believes this is why patient refuses hospital admissions or medical care because she is scared about being hospitalized in mental institution.    - Patient refuses to discuss prior psych hx and says 'it is private"

## 2023-08-27 NOTE — ED BEHAVIORAL HEALTH NOTE - BEHAVIORAL HEALTH NOTE
===================     PRE-HOSPITAL COURSE     ===================     SOURCE: Secondhand documentation and consult request    DETAILS: Pt BIBA for medical concerns including hypoglycemia and unsteadiness at home. ED team requested that Telepsychiatry consult on pt's capacity to leave ED AMA.     ============     ED COURSE     ============     SOURCE: Secondhand documentation and consult request    ARRIVAL MODE: Per documentation, pt presents with poor personal hygiene and appears malnourished. Pt requires assistance ambulating.     BELONGINGS: Nothing of note.     BEHAVIOR: Per consult request, pt presents as very malnourished with a gaunt and yellow appearance to her frame and skin. Pt presents with multiple medical concerns including hypoglycemia, weakness, and a recent fall at physical therapy. Per consult provider, pt is refusing any treatment and wants to leave ED. Pt has hx of anorexia and was psychiatrically admitted approximately 20 years ago.      TREATMENT: No psychiatric medications have been required for this pt.     VISITORS:  No visitors at bedside.         ------------------------------------------------  COVID Exposure Screen- collateral (i.e. third-party, chart review, belongings, etc; include EMS and ED staff)  ---------------------------------------------------  1. Has the patient had a COVID-19 test in the last 90 days? Unknown.  2. Has the patient tested positive for COVID-19 antibodies? Unknown.  3.Has the patient received 2 doses of the COVID-19 vaccine?  Unknown.  4. In the past 10 days, has the patient been around anyone with a positive COVID-19 test?* Unknown.  5. Has the patient been out of [place] within the past 10 days? Unknown.

## 2023-08-27 NOTE — ED ADULT NURSE NOTE - OBJECTIVE STATEMENT
Patient comes in for hypoglycemia. as per EMS FS 48 when they got to her, currently infusing D10 250ml. patient FS now 171. patient refusing to get undressed.

## 2023-08-27 NOTE — ED PROVIDER NOTE - CAREGIVER/GUARDIAN DETAILS FREE TEXT FOR MDM ADDL HISTORY OBTAINED FROM QUESTION
Spoke with aide.  Aide also agrees patient is not a safe discharge.  States she is very weak and has frequent falls.  Aide had to catch her due to weakness and was there to help prevent any serious injury to extremities or head.  States patient unable to use the restroom by herself.

## 2023-08-27 NOTE — ED PROVIDER NOTE - FAMILY DETAILS FREE TEXT FOR MDM ADDL HISTORY OBTAINED FROM QUESTION
Spoke with sister, Lexis.  States she does not believe patient is a safe discharge.  States she currently lives alone and only has an aide for a few hours a few times a week.  States she needs 24-hour aide support.  States patient is very difficult and refuses admission.  States patient will call EMS in the middle night to help her use the restroom.  Sister also states patient has a history of schizoid personality and anorexia, has been hospitalized at mental institution over 20 years ago.  Sister believes this is why patient refuses hospital admissions or medical care because she is scared about being hospitalized in mental institution.  Sister also states patient is very smart and bright, knows her rights and also refuses to give any sibling power of . Spoke with sister, Lexis (cell phone [phone]).  States she does not believe patient is a safe discharge.  States she currently lives alone and only has an aide for a few hours a few times a week.  States she needs 24-hour aide support.  States patient is very difficult and refuses admission.  States patient will call EMS in the middle night to help her use the restroom.  Sister also states patient has a history of schizoid personality and anorexia, has been hospitalized at mental institution over 20 years ago.  Sister believes this is why patient refuses hospital admissions or medical care because she is scared about being hospitalized in mental institution.  Sister also states patient is very smart and bright, knows her rights and also refuses to give any sibling power of .

## 2023-08-27 NOTE — H&P ADULT - ATTENDING COMMENTS
52yo F with PMH anemia, hypothyroidism, osteoporosis, chronic hyponatremia, depression, anxiety presents to ED on 8/27/23 due to weakness and hypoglycemia. Admitted to medicine for weakness and fall, pancytopenia.    HPI as above.     Plan:  Anemia: transfuse 1 uprbc, patient does not have MDM capacity  -discussed with patients sister, agreeable to plan    Electrolyte derangements: replete    Pancytopenia:likely nutrional, continue to monitor  -f/u AM labs    Hypothyroidsm: TSH elevated  -f/u remaidner of thyroid studies  -start levothyroxine    Psych consulted    DVT ppx: SCD's 54yo F with PMH anemia, hypothyroidism, osteoporosis, chronic hyponatremia, depression, anxiety presents to ED on 8/27/23 due to weakness and hypoglycemia. Admitted to medicine for weakness and fall, pancytopenia.    HPI as above.     Plan:  Anemia: transfuse 1 uprbc, patient does not have MDM capacity  -discussed with patients sister, agreeable to plan    Electrolyte derangements: replete    Pancytopenia:likely nutrional, continue to monitor  -f/u AM labs    Hypothyroidsm: TSH elevated  -f/u remaidner of thyroid studies  -start levothyroxine    Transaminitis: monitor LFT's    Psych consulted    DVT ppx: SCD's

## 2023-08-27 NOTE — PATIENT PROFILE ADULT - FALL HARM RISK - HARM RISK INTERVENTIONS

## 2023-08-27 NOTE — ED BEHAVIORAL HEALTH ASSESSMENT NOTE - AXIS III
anemia (could not get bone marrow biopsy due to osteopenia), hypothyroidism, osteoporosis, sarcopenia, chronic hyponatremia, constipation, LE edema, LE wound, amenorrhea (only got period once in life), anorexia at age 15, hx of mechanical falls since 2015 with Right clavicle fx, Right greater trochanter fracture in 2015, 8/24/23 comminuted, slightly displaced left distal radius Fx

## 2023-08-27 NOTE — H&P ADULT - ASSESSMENT
54yo F with PMH anemia, hypothyroidism, osteoporosis, chronic hyponatremia, depression, anxiety presents to ED on 8/27/23 due to weakness and hypoglycemia. Admitted to medicine for weakness and fall, pancytopenia.                ED Course:   Vitals: BP: , HR: , Temp: , RR: , SpO2: % on   Labs:  WBC 2.5, Hbg 7.0, plt 123, Na 129, K 2.7, BUN 55, Glu 209, Ca 7.2, ast 142, ALT 90, TSH 11.4  Urine/Blood tox: negative  UA: trace LE, squamous epithelial present, 100 glucose  CXR: unremarkable as per personal read, ofiical read states simialr to recent CXR on 8/24  Xray wrist:  There is a comminuted large horizontal fracture of the distal radius possibly with extension to the inner endplate. There is mild dorsal indentation distal fracture.  EKG: pending  Received 40mEq KCl PO x1, 10mEq KCl x3 in the ED  52yo F with PMH anemia, hypothyroidism, osteoporosis, chronic hyponatremia, depression, anxiety presents to ED on 8/27/23 due to weakness and hypoglycemia. Admitted to medicine for weakness and fall, pancytopenia.

## 2023-08-27 NOTE — ED ADULT NURSE NOTE - NSFALLHARMRISKINTERV_ED_ALL_ED
Assistance OOB with selected safe patient handling equipment if applicable/Communicate risk of Fall with Harm to all staff, patient, and family/Provide visual cue: red socks, yellow wristband, yellow gown, etc/Reinforce activity limits and safety measures with patient and family/Bed in lowest position, wheels locked, appropriate side rails in place/Call bell, personal items and telephone in reach/Instruct patient to call for assistance before getting out of bed/chair/stretcher/Non-slip footwear applied when patient is off stretcher/[place] to call system/Physically safe environment - no spills, clutter or unnecessary equipment/Purposeful Proactive Rounding/Room/bathroom lighting operational, light cord in reach

## 2023-08-27 NOTE — ED BEHAVIORAL HEALTH ASSESSMENT NOTE - SUMMARY
NO capacity to leave AMA at this time   MEDICAL RECOMMENDATIONS: please re-weigh patient (she says she is around 70 pounds which is not recorded 43.1 kg and she looks very gaunt)  - Davi chappell: keeps asking for several blankets; fall risk; PT consult  - c/o left leg pain during EMS transport: please assess   - high TSH reporting sxs of extreme cold intolerance, weakness, constipation, brittle hair/parchment like skin - would get T3, T4 Pt NOT on Synthroid.   - VERY low Hg/Ht 7, 19..4 - no hx of transfusion, said bone marrow biopsy could to be done due to severe osteopenia (all blood cells low, not iron deficiency) needs transfusion  - signs of severe calorie, protein deficiency; Pt underweight, gaunt - needs calorie count, meal assists with food intake log to track PO intake  - would get serum levels for B12, folate, iron, ferritin, pre-albumin, lipid panel   - medically optimize NO capacity to leave AMA at this time   MEDICAL RECOMMENDATIONS: please re-weigh patient (she says she is around 70 pounds which is not recorded 43.1 kg and she looks very gaunt)  - Davi chappell: keeps asking for several blankets; fall risk; PT consult  - c/o left leg pain during EMS transport: please assess   - high TSH reporting sxs of extreme cold intolerance, weakness, constipation, brittle hair/parchment like skin - would get T3, T4 Pt NOT on Synthroid.   - VERY low Hg/Ht 7, 19..4 - no hx of transfusion, said bone marrow biopsy could to be done due to severe osteopenia (all blood cells low, not iron deficiency) needs transfusion  - signs of severe calorie, nutrition deficiency; Pt underweight, gaunt - needs calorie count, meal assists with food intake log to track PO intake  - stool softner, BM regimen   - would get serum levels for B12, folate, iron, ferritin, pre-albumin, lipid panel   - medically optimize

## 2023-08-27 NOTE — H&P ADULT - NSHPREVIEWOFSYSTEMS_GEN_ALL_CORE
REVIEW OF SYSTEMS:    CONSTITUTIONAL:  + weakness, + cold, no fevers  EYES/ENT:  No visual changes;  No vertigo or throat pain   NECK:  No pain or stiffness  RESPIRATORY:  No cough, wheezing, hemoptysis; No shortness of breath  CARDIOVASCULAR:  No chest pain or palpitations  GASTROINTESTINAL: + constipation, No abdominal or epigastric pain. No nausea, vomiting, or hematemesis; no diarrhea  NEUROLOGICAL:  No numbness or weakness  SKIN: bruises on arms and legs

## 2023-08-27 NOTE — H&P ADULT - PROBLEM SELECTOR PLAN 5
TSH 11.4, pt endorses being very cold    - f/u ft4, ft3  - consider starting levothyroxine pending thyroid studies TSH 11.4, pt endorses being very cold    - f/u ft4, ft3  - start levothyroxine 25 mcg daily, increase as necessary

## 2023-08-27 NOTE — ED PROVIDER NOTE - DIFFERENTIAL DIAGNOSIS
Differentials include but not limited to failure to thrive, malnutrition, electrolyte abnormality, dehydration, anemia, infection Differential Diagnosis

## 2023-08-27 NOTE — H&P ADULT - PROBLEM SELECTOR PLAN 3
Patient presenting due to weakness, found to be hypoglycemic by EMS to 48, hyperglycemic on arrival. recent fall on 8/23 leading to broken wrist. concern for malnutrition vs gait disturbance vs deconditioning    - f/u nutrition recs  - trend glucose  - f/u PT/OT  - fall risk Patient presenting due to weakness, found to be hypoglycemic by EMS to 48, hyperglycemic on arrival. recent fall on 8/23 leading to broken wrist. concern for malnutrition vs gait disturbance vs deconditioning    - started on [id number] diet  - f/u nutrition recs  - trend glucose  - f/u PT/OT  - fall risk

## 2023-08-27 NOTE — CONSULT NOTE ADULT - ASSESSMENT
Hyponatremia  Hypokalemia  Hypothyroidism  FTT  Anemia     -Suspect low solute in take, tea and toats physiology  -KCL repletion  -Check urine lytes  -Check Ur Osm  -PRBCs per primary  -TSH elevated, check T4, could also cause hyponatremia  -Deemed not to have capacity by psych    d/w primary

## 2023-08-27 NOTE — H&P ADULT - NSHPPHYSICALEXAM_GEN_ALL_CORE
T(C): 36.7 (08-27-23 @ 11:08), Max: 36.7 (08-27-23 @ 11:08)  HR: 84 (08-27-23 @ 11:08) (84 - 84)  BP: 125/87 (08-27-23 @ 11:08) (125/87 - 125/87)  RR: 20 (08-27-23 @ 11:08) (20 - 20)  SpO2: 100% (08-27-23 @ 11:08) (100% - 100%)    GENERAL: patient appears well, no acute distress, appropriately interactive  EYES: sclera clear, no exudates  ENMT: oropharynx clear without erythema, no exudates, moist mucous membranes  NECK: supple, soft  LUNGS: good air entry bilaterally, clear to auscultation, symmetric breath sounds, no wheezing or rhonchi appreciated  HEART: soft S1/S2, regular rate and rhythm, no murmurs noted, no lower extremity edema  GASTROINTESTINAL: abdomen is soft, nontender, nondistended, normoactive bowel sounds  INTEGUMENT: good skin turgor, warm skin, appears well perfused  MUSCULOSKELETAL: no clubbing or cyanosis, no obvious deformity  NEUROLOGIC: awake, alert, oriented x3, good muscle tone in all 4 extremities  HEME/LYMPH: no obvious ecchymosis or petechiae T(C): 36.7 (08-27-23 @ 11:08), Max: 36.7 (08-27-23 @ 11:08)  HR: 84 (08-27-23 @ 11:08) (84 - 84)  BP: 125/87 (08-27-23 @ 11:08) (125/87 - 125/87)  RR: 20 (08-27-23 @ 11:08) (20 - 20)  SpO2: 100% (08-27-23 @ 11:08) (100% - 100%)    GENERAL: patient is anorexic, unwell, malnourished  EYES: sunken eyes  ENMT: sunken cheeks, oropharynx clear without erythema, no exudates, dry mucous membranes  LUNGS: good air entry bilaterally, clear to auscultation, symmetric breath sounds, no wheezing or rhonchi appreciated  HEART: soft S1/S2, regular rate and rhythm, no murmurs noted, no lower extremity edema  GASTROINTESTINAL: abdomen is scaphoid, nontender, nondistended  INTEGUMENT: + left lower extremity laceration, scattered bruises and cuts across all 4 extremities  MUSCULOSKELETAL: cachectic, diffuse muscle atrophy, no clubbing or cyanosis  NEUROLOGIC: awake, alert, oriented x3  PSYCH: not able to make decision  HEME/LYMPH: diffuse scattered echymosis

## 2023-08-27 NOTE — ED PROVIDER NOTE - CONSTITUTIONAL, MLM
normal... awake, alert, oriented to person, place, time/situation. chronically ill appearing, cachetic

## 2023-08-27 NOTE — ED BEHAVIORAL HEALTH ASSESSMENT NOTE - CURRENTLY ENROLLED STUDENT
No
[FreeTextEntry1] : Encourage fluids\par OTC: Colace, Mag Citrate and Dulcolax.\par When JUN has had regular BM. JUN will f/u CT abd with oral contrast.

## 2023-08-27 NOTE — H&P ADULT - NSHPSOCIALHISTORY_GEN_ALL_CORE
Tobacco Usage:  unable to obtain  Alcohol Usage: unable to obtain  Substance Use:  narcotics in the past  Lives with: lives alone, has aides intermittently  ADLs: need assistance using restroom and with ADLS

## 2023-08-27 NOTE — ED BEHAVIORAL HEALTH ASSESSMENT NOTE - HPI (INCLUDE ILLNESS QUALITY, SEVERITY, DURATION, TIMING, CONTEXT, MODIFYING FACTORS, ASSOCIATED SIGNS AND SYMPTOMS)
54 yo F, single, noncaregiver, domiciled in an assisted living, has part-time HHA a few days a week for a few hours per day, hx of mechanical falls since 2015 sustaining Right clavicle fx, Right greater trochanter fracture in 2015, was at  ED on 8/24/23 for a fall during PT session and sustained a  comminuted, slightly displaced left distal radius Fx, seen by Orthopedics who applied splint with clinic follow up in 1 week recommended. Patient left AMA. Patient returns today after becoming weak and her aide assisted her down to the floor. FS was 48 and EMS gave Pt D10. Significant labs findings of Hg 7/Ht 19.4, WBC, RBC also below average, sodium 129, potassium 2.7, BUN 55, TSH 11.4, UA negative, UTOX and serum tox negative. Recent imaging: multilevel vertebral body height loss most severe at T12, L1, T5, Right renal staghorn type calcifications. Numerous subcentimeter calcifications left renal calcifications. 4 mm nodule adjacent to the left major fissure, 5 mm left upper lobe nodule of lungs. CT head 8/24/23 showing Age-appropriate involutional change.     CVM: no record of patient  ISTOP:  | Reference #: [id number] no record of patient 52 yo F, single, noncaregiver, domiciled in an assisted living, has part-time HHA a few days a week for a few hours per day, PMH of anemia, hypothyroidism, osteoporosis, chronic hyponatremia, constipation, hx of mechanical falls since 2015 sustaining Right clavicle fx, Right greater trochanter fracture in 2015, was at  ED on 8/24/23 for a fall during PT session and sustained a  comminuted, slightly displaced left distal radius Fx, seen by Orthopedics who applied splint with clinic follow up in 1 week recommended. Patient left AMA. Patient returns today after becoming weak and her aide assisted her down to the floor. FS was 48 and EMS gave Pt D10. Significant labs findings of Hg 7/Ht 19.4, WBC, RBC, platelets also below average, sodium 129, potassium 2.7, BUN 55, TSH 11.4, UA negative, UTOX and serum tox negative. Recent imaging: multilevel vertebral body height loss most severe at T12, L1, T5, Right renal staghorn type calcifications. Numerous subcentimeter calcifications left renal calcifications. 4 mm nodule adjacent to the left major fissure, 5 mm left upper lobe nodule of lungs. CT head 8/24/23 showing Age-appropriate involutional change. Patient's sister told ED staff that Pt need 24-hour aide support, is "very difficult and refuses admission", Patient will call EMS in the middle night to help her use the restroom. As per ED MD note: "Aide also agrees patient is not a safe discharge.  States she is very weak and has frequent falls.  Aide had to catch her due to weakness and was there to help prevent any serious injury to extremities or head.  States patient unable to use the restroom by herself." Psychiatric consult requested for capacity to leave AMA.    CVM: no record of patient  ISTOP:  | Reference #: [id number] no record of patient    Spoke with sister, Lexis (cell phone [phone]).  States she does not believe patient is a safe discharge.  States she currently lives alone and only has an aide for a few hours a few times a week.  States she needs 24-hour aide support.  States patient is very difficult and refuses admission.  States patient will call EMS in the middle night to help her use the restroom.  Sister also states patient has a history of schizoid personality and anorexia, has been hospitalized at mental institution over 20 years ago.  Sister believes this is why patient refuses hospital admissions or medical care because she is scared about being hospitalized in mental institution.  Sister also states patient is very smart and bright, knows her rights and also refuses to give any sibling power of . 54 yo F, single, noncaregiver, domiciled in an assisted living, has part-time HHA a few days a week for a few hours per day, PMH of anemia, hypothyroidism, osteoporosis, chronic hyponatremia, constipation, LE edema, LE wound, hx of mechanical falls since 2015 sustaining Right clavicle fx, Right greater trochanter fracture in 2015, was at  ED on 8/24/23 for a fall during PT session and sustained a  comminuted, slightly displaced left distal radius Fx, seen by Orthopedics who applied splint with clinic follow up in 1 week recommended. Patient left AMA. Patient returns today after becoming weak and her aide assisted her down to the floor. FS was 48 and EMS gave Pt D10. Significant labs findings of Hg 7/Ht 19.4, WBC, RBC, platelets also below average, sodium 129, potassium 2.7, BUN 55, TSH 11.4, UA negative, UTOX and serum tox negative. Recent imaging: multilevel vertebral body height loss most severe at T12, L1, T5, Right renal staghorn type calcifications. Numerous subcentimeter calcifications left renal calcifications. 4 mm nodule adjacent to the left major fissure, 5 mm left upper lobe nodule of lungs. CT head 8/24/23 showing Age-appropriate involutional change. Patient's sister told ED staff that Pt need 24-hour aide support, is "very difficult and refuses admission", Patient will call EMS in the middle night to help her use the restroom. As per ED MD note: "Aide also agrees patient is not a safe discharge.  States she is very weak and has frequent falls.  Aide had to catch her due to weakness and was there to help prevent any serious injury to extremities or head.  States patient unable to use the restroom by herself." Psychiatric consult requested for capacity to leave AMA.    CVM: no record of patient  ISTOP:  | Reference #: [id number] no record of patient    Spoke with sister, Lexis (cell phone [phone]).  States she does not believe patient is a safe discharge.  States she currently lives alone and only has an aide for a few hours a few times a week.  States she needs 24-hour aide support.  States patient is very difficult and refuses admission.  States patient will call EMS in the middle night to help her use the restroom.  Sister also states patient has a history of schizoid personality and anorexia, has been hospitalized at mental institution over 20 years ago.  Sister believes this is why patient refuses hospital admissions or medical care because she is scared about being hospitalized in mental institution.  Sister also states patient is very smart and bright, knows her rights and also refuses to give any sibling power of . 54 yo F, single, noncaregiver, domiciled in a senior apartment complex, on disability for physical condition, has part-time HHA a few days a week for a few hours per day, PMH of anemia (could not get bone marrow biopsy due to osteopenia), hypothyroidism, osteoporosis, sarcopenia, chronic hyponatremia, constipation, LE edema, LE wound, amenorrhea (only got period once in life), anorexia at age 15, hx of mechanical falls since 2015 sustaining Right clavicle fx, Right greater trochanter fracture in 2015, was at  ED on 8/24/23 for a fall during PT session and sustained a  comminuted, slightly displaced left distal radius Fx, seen by Orthopedics who applied splint with clinic follow up in 1 week recommended. Patient left AMA. Patient returns today after becoming weak and her aide assisted her down to the floor. FS was 48 and EMS gave Pt D10. Significant labs findings of Hg 7/Ht 19.4, WBC, RBC, platelets also below average, sodium 129, potassium 2.7, BUN 55, TSH 11.4, UA negative, UTOX and serum tox negative. Recent imaging: multilevel vertebral body height loss most severe at T12, L1, T5, Right renal staghorn type calcifications. Numerous subcentimeter calcifications left renal calcifications. 4 mm nodule adjacent to the left major fissure, 5 mm left upper lobe nodule of lungs. CT head 8/24/23 showing Age-appropriate involutional change. Patient's sister told ED staff that Pt need 24-hour aide support, is "very difficult and refuses admission", Patient will call EMS in the middle night to help her use the restroom. As per ED MD note: "Aide also agrees patient is not a safe discharge.  States she is very weak and has frequent falls.  Aide had to catch her due to weakness and was there to help prevent any serious injury to extremities or head.  States patient unable to use the restroom by herself." Psychiatric consult requested for capacity to leave AMA.    EXAM: Patient looks older than stated age, looks unhealthy and unwell- skeletal/gaunt, parchment-like yellowish skin, deep sunken eyes, deep lines around mouth area, sparse brittle hair. calm, cooperative, engages, constricted affect, maintains attention with slight effort, perseverates on being "very cold and hungry." says she needs several warm blankets, she needs food and  ED did not give her food for 10 hours last time. Patient evasive on her dietary habits - admits she is "particular," and does not eat meat, eats dairy products, vegetables. Denies calorie count or measure serving sizes but evasive when asked to estimate amount eaten. Then says she has dry throat making swallowing food hard but denies feeling like she does not have enough saliva ("I drink water so mouth is not dry." patient guarded, provides superficial information, becomes even more guarded when asked if she sa a psychiatrist before etc. Refused to answer any psychiatric questions pertaining to history, eating disorder pattern, mood, suicidality etc. Patient refuses to discuss prior psych hx and says 'it is private." Patient asked "how much is my hemoglobin." Lab values provided for her as well as the caveat that her body is not getting enough oxygen, nutrients as there is not enough blood volume / cells in her body at this time, which can lead to strain on the heart to the point of dying. Affect unchanged when heard this information and subject changed. Did ont give a clear answer whether she takes her RX medications as prescribed. Focus on physical needs just as several warm blankets, food and exam of her left leg which she said might have been injure during transport.     CVM: no record of patient  ISTOP:  | Reference #: [id number] no record of patient    Spoke with sister, Lexis (cell phone [phone]).  States she does not believe patient is a safe discharge.  States she currently lives alone and only has an aide for a few hours a few times a week.  States she needs 24-hour aide support.  States patient is very difficult and refuses admission.  States patient will call EMS in the middle night to help her use the restroom.  Sister also states patient has a history of schizoid personality and anorexia, has been hospitalized at mental institution over 20 years ago.  Sister believes this is why patient refuses hospital admissions or medical care because she is scared about being hospitalized in mental institution.  Sister also states patient is very smart and bright, knows her rights and also refuses to give any sibling power of . 54 yo F, single, noncaregiver, domiciled in a senior apartment complex, on disability for physical condition, has part-time HHA a few days a week for a few hours per day, PMH of anemia (could not get bone marrow biopsy due to osteopenia), hypothyroidism, osteoporosis, sarcopenia, chronic hyponatremia, constipation, LE edema, LE wound, amenorrhea (only got period once in life), anorexia at age 15, hx of mechanical falls since 2015 sustaining Right clavicle fx, Right greater trochanter fracture in 2015, was at  ED on 8/24/23 for a fall during PT session and sustained a  comminuted, slightly displaced left distal radius Fx, seen by Orthopedics who applied splint with clinic follow up in 1 week recommended. Patient left AMA. Patient returns today after becoming weak and her aide assisted her down to the floor. FS was 48 and EMS gave Pt D10. Significant labs findings of Hg 7/Ht 19.4, WBC, RBC, platelets also below average, sodium 129, potassium 2.7, BUN 55, TSH 11.4, UA negative, UTOX and serum tox negative. Recent imaging: multilevel vertebral body height loss most severe at T12, L1, T5, Right renal staghorn type calcifications. Numerous subcentimeter calcifications left renal calcifications. 4 mm nodule adjacent to the left major fissure, 5 mm left upper lobe nodule of lungs. CT head 8/24/23 showing Age-appropriate involutional change. Patient's sister told ED staff that Pt need 24-hour aide support, is "very difficult and refuses admission", Patient will call EMS in the middle night to help her use the restroom. As per ED MD note: "Aide also agrees patient is not a safe discharge.  States she is very weak and has frequent falls.  Aide had to catch her due to weakness and was there to help prevent any serious injury to extremities or head.  States patient unable to use the restroom by herself." Psychiatric consult requested for capacity to leave AMA.    EXAM: Patient looks older than stated age, looks unhealthy and unwell- skeletal/gaunt, parchment-like yellowish skin, deep sunken eyes, deep lines around mouth area, sparse brittle hair. calm, cooperative, engages, constricted affect, maintains attention with slight effort, perseverates on being "very cold and hungry." says she needs several warm blankets, she needs food and  ED did not give her food for 10 hours last time. Patient evasive on her dietary habits - admits she is "particular," and does not eat meat, eats dairy products, vegetables. Denies calorie count or measure serving sizes but evasive when asked to estimate amount eaten. Then says she has dry throat making swallowing food hard but denies feeling like she does not have enough saliva ("I drink water so mouth is not dry." patient guarded, provides superficial information, becomes even more guarded when asked if she sa a psychiatrist before etc. Refused to answer any psychiatric questions pertaining to history, eating disorder pattern, mood, suicidality etc. Patient refuses to discuss prior psych hx and says 'it is private." Patient asked "how much is my hemoglobin." Lab values provided for her as well as the caveat that her body is not getting enough oxygen, nutrients as there is not enough blood volume / cells in her body at this time, which can lead to strain on the heart to the point of dying. Affect unchanged when heard this information and subject changed. Did ont give a clear answer whether she takes her RX medications as prescribed. Focus on physical needs just as several warm blankets, food and exam of her left leg which she said might have been injure during transport.     CVM: no record of patient  ISTOP:  | Reference #: [id number] no record of patient

## 2023-08-27 NOTE — ED BEHAVIORAL HEALTH ASSESSMENT NOTE - OTHER
Pt's sister 'I am very cold" Pt is very guarded, unable to ascertain exact thought content due to exam limitation at this time [phone] unable to ascertain via camera unable to tolerate an MMSE at this time very constricted maintains with some effort looks older than stated age, looks unhealthy and unwell- skeletal/gaunt, parchment-like yellowish skin, deep sunken eyes, deep lines around mouth area, sparse brittle hair. impaired deferred does not seem to be internally preoccupied "weak" volume / low energy

## 2023-08-27 NOTE — H&P ADULT - HISTORY OF PRESENT ILLNESS
IN PROGRESS    52yo F with PMH anemia, hypothyroidism, osteoporosis, chronic hyponatremia, depression, anxiety presents to ED on 8/27/23 due to weakness and hypoglycemia.  Patient was seen at this emergency room 8/24 ago for a fall at physical therapy 8/23.  Was diagnosed with left wrist fracture at that time.  Splint was applied and seen by orthopedics.  Recommended admission due to fall risk and unsafe discharge.  Patient refused admission and left AMA.  Patient was fully aware of  fall risk and all lab abnormalities.  States she wanted to follow-up outpatient.  Today patient became weak and aide assisted her down to the floor.  Denies hitting head or any injuries from the fall because aide assisted her.  EMS was called.  Sugar on arrival per EMS was 48 and given D10.  Sugar on arrival to emergency room was 171.  Patient denies any pain or complaints.  Denies any injuries from the fall.  At this time patient is refusing any treatment or test.  States she just wants to go home and eat.  States her sugar was low because she did not eat today but she says she will eat when she gets home. patient removed splint that was placed 2 days ago and was not wearing splint today per EMS      Denies fever, chills, chest pain, palpitations, SOB, cough, abdominal pain, nausea, vomiting, diarrhea, constipation, urinary frequency, urgency, or dysuria, headaches, changes in vision, dizziness, numbness, tingling.  Denies recent travel, recent antibiotic use, or sick contacts.    ED Course:   Vitals: BP: , HR: , Temp: , RR: , SpO2: % on   Labs:  WBC 2.5, Hbg 7.0, plt 123, Na 129, K 2.7, BUN 55, Glu 209, Ca 7.2, ast 142, ALT 90, TSH 11.4  Urine/Blood tox: negative  UA: trace LE, squamous epithelial present, 100 glucose  CXR: unremarkable as per personal read, ofiical read states simialr to recent CXR on 8/24  Xray wrist:  There is a comminuted large horizontal fracture of the distal radius possibly with extension to the inner endplate. There is mild dorsal indentation distal fracture.  EKG: pending  Received 40mEq KCl PO x1, 10mEq KCl x3 in the ED    54yo F with PMH anemia, hypothyroidism, osteoporosis, chronic hyponatremia, depression, anxiety presents to ED on 8/27/23 due to weakness and hypoglycemia.  Patient was seen at this emergency room 8/24 ago for a fall at physical therapy 8/23.  Was diagnosed with left wrist fracture at that time.  Splint was applied and seen by orthopedics.  Recommended admission due to fall risk and unsafe discharge.  Patient refused admission and left AMA.  Patient was fully aware of  fall risk and all lab abnormalities.  States she wanted to follow-up outpatient.  Today patient became weak and aide assisted her down to the floor.  Denies hitting head or any injuries from the fall because aide assisted her.  EMS was called.  Sugar on arrival per EMS was 48 and given D10.  Sugar on arrival to emergency room was 171.  Patient denies any pain or complaints.  Denies any injuries from the fall.  At this time patient is refusing any treatment or test.  States she just wants to go home and eat.  States her sugar was low because she did not eat today but she says she will eat when she gets home. Patient removed splint that was placed 2 days ago and was not wearing splint today per EMS. Patient's sister reports that she has been getting worse for the last 6 months and has become increasingly more frail. Patient lives alone with aides as per sister. Patient has ritualistic eating habits and a diet which includes eggs, fish, and vegetables. She currently takes no medications.       Denies fever, chills, chest pain, palpitations, SOB, cough, abdominal pain, nausea, vomiting, diarrhea, constipation, urinary frequency, urgency, or dysuria, headaches, changes in vision, dizziness, numbness, tingling.  Denies recent travel, recent antibiotic use, or sick contacts.    ED Course:   Vitals: BP: , HR: , Temp: , RR: , SpO2: % on   Labs:  WBC 2.5, Hbg 7.0, plt 123, Na 129, K 2.7, BUN 55, Glu 209, Ca 7.2, ast 142, ALT 90, TSH 11.4  Urine/Blood tox: negative  UA: trace LE, squamous epithelial present, 100 glucose  CXR: unremarkable as per personal read, ofiical read states simialr to recent CXR on 8/24  Xray wrist:  There is a comminuted large horizontal fracture of the distal radius possibly with extension to the inner endplate. There is mild dorsal indentation distal fracture.  EKG: pending  Received 40mEq KCl PO x1, 10mEq KCl x3 in the ED    52yo F with PMH anemia, hypothyroidism, osteoporosis, chronic hyponatremia, depression, anxiety presents to ED on 8/27/23 due to weakness and hypoglycemia.  Patient was seen at this emergency room 8/24 ago for a fall at physical therapy 8/23.  Was diagnosed with left wrist fracture at that time.  Splint was applied and seen by orthopedics.  Recommended admission due to fall risk and unsafe discharge.  Patient refused admission and left AMA.  Patient was fully aware of  fall risk and all lab abnormalities.  States she wanted to follow-up outpatient.  Today patient became weak and aide assisted her down to the floor.  Denies hitting head or any injuries from the fall because aide assisted her.  EMS was called.  Sugar on arrival per EMS was 48 and given D10.  Sugar on arrival to emergency room was 171.  Patient denies any pain or complaints.  Denies any injuries from the fall.  At this time patient is refusing any treatment or test.  States she just wants to go home and eat.  States her sugar was low because she did not eat today but she says she will eat when she gets home. Patient removed splint that was placed 2 days ago and was not wearing splint today per EMS. Patient's sister reports that she has been getting worse for the last 6 months and has become increasingly more frail. Patient lives alone with aides as per sister. Patient has ritualistic eating habits and a diet which includes eggs, fish, and vegetables. She currently takes no medications.       Denies fever, chills, chest pain, palpitations, SOB, cough, abdominal pain, nausea, vomiting, diarrhea, constipation, urinary frequency, urgency, or dysuria, headaches, changes in vision, dizziness, numbness, tingling.  Denies recent travel, recent antibiotic use, or sick contacts.    ED Course:   Vitals: BP: , HR: , Temp: , RR: , SpO2: % on   Labs:  WBC 2.5, Hbg 7.0, plt 123, Na 129, K 2.7, BUN 55, Glu 209, Ca 7.2, ast 142, ALT 90, TSH 11.4  Urine/Blood tox: negative  UA: trace LE, squamous epithelial present, 100 glucose  CXR: unremarkable as per personal read, ofiical read states simialr to recent CXR on 8/24  Xray wrist:  There is a comminuted large horizontal fracture of the distal radius possibly with extension to the inner endplate. There is mild dorsal indentation distal fracture.  Received 40mEq KCl PO x1, 10mEq KCl x3 in the ED

## 2023-08-27 NOTE — ED PROVIDER NOTE - OBJECTIVE STATEMENT
53-year-old female with history of anemia, hypothyroidism, osteoporosis, chronic hyponatremia, depression, anxiety brought in by ambulance for evaluation after weakness and hypoglycemia.  Patient was seen at this emergency room 2 days ago for a fall at physical therapy the day before.  Was diagnosed with left wrist fracture at that time.  Splint was applied and seen by orthopedics.  Recommended admission at that time since patient was thought to be a fall risk and not a safe discharge.  Patient left AMA and refused admission.  Patient was fully aware that she was a fall risk and all lab abnormalities.  States she wanted to follow-up outpatient.  Today patient became weak and aide assisted her down to the floor.  Denies hitting head or any injuries from the fall because aide assisted her.  EMS was called.  Sugar on arrival per EMS was 48 and given D10.  Sugar on arrival to emergency room was 171.  Patient denies any pain or complaints.  Denies any injuries from the fall.  At this time patient is refusing any treatment or test.  States she just wants to go home and eat.  States her sugar was low because she did not eat today but she says she will eat when she gets home. patient removed splint that was placed 2 days ago and was not wearing splint today per EMS  No current PCP

## 2023-08-27 NOTE — H&P ADULT - PROBLEM SELECTOR PLAN 6
on arrival, ast 142, ALT 90. unclear etiology, potentially secondary to malnutrition    - trend CMP  - f/u nutrition recs

## 2023-08-27 NOTE — H&P ADULT - PROBLEM SELECTOR PLAN 7
pmh anorexia, pancytopenia, hyponatremia, hypokalemia, hypocalcemia.     - diet: lacto-ovo veg   - f/u nutrition recs

## 2023-08-28 NOTE — PROGRESS NOTE ADULT - ASSESSMENT
Hyponatremia  Hypokalemia  Hypothyroidism  FTT  Anemia   Acute respiratory failure  Bradycardia     -Suspect low solute in take, tea and toast physiology  -Check urine lytes - pending   -Check Ur Osm  -PRBCs per primary  -TSH elevated, check T4, could also cause hyponatremia  -Deemed not to have capacity by psych  -Sodium improving     d/w primary

## 2023-08-28 NOTE — DIETITIAN INITIAL EVALUATION ADULT - PERTINENT LABORATORY DATA
08-28    134<L>  |  105  |  44<H>  ----------------------------<  770<HH>  3.3<L>   |  19<L>  |  0.82    Ca    6.2<LL>      28 Aug 2023 09:35  Phos  3.5     08-28  Mg     2.1     08-28    TPro  4.3<L>  /  Alb  2.6<L>  /  TBili  1.4<H>  /  DBili  x   /  AST  753<H>  /  ALT  403<H>  /  AlkPhos  68  08-28  POCT Blood Glucose.: >600 mg/dL (08-28-23 @ 11:29)

## 2023-08-28 NOTE — PROGRESS NOTE ADULT - SUBJECTIVE AND OBJECTIVE BOX
Patient is a 53y old  Female who presents with a chief complaint of fall, pancytopenia (27 Aug 2023 17:14)       HPI:  54yo F with PMH anemia, hypothyroidism, osteoporosis, chronic hyponatremia, depression, anxiety presents to ED on 8/27/23 due to weakness and hypoglycemia.  Patient was seen at this emergency room 8/24 ago for a fall at physical therapy 8/23.  Was diagnosed with left wrist fracture at that time.  Splint was applied and seen by orthopedics.  Recommended admission due to fall risk and unsafe discharge.  Patient refused admission and left AMA.  Patient was fully aware of  fall risk and all lab abnormalities.  States she wanted to follow-up outpatient.  Today patient became weak and aide assisted her down to the floor.  Denies hitting head or any injuries from the fall because aide assisted her.  EMS was called.  Sugar on arrival per EMS was 48 and given D10.  Sugar on arrival to emergency room was 171.  Patient denies any pain or complaints.  Denies any injuries from the fall.  At this time patient is refusing any treatment or test.  States she just wants to go home and eat.  States her sugar was low because she did not eat today but she says she will eat when she gets home. Patient removed splint that was placed 2 days ago and was not wearing splint today per EMS. Patient's sister reports that she has been getting worse for the last 6 months and has become increasingly more frail. Patient lives alone with aides as per sister. Patient has ritualistic eating habits and a diet which includes eggs, fish, and vegetables. She currently takes no medications.   Renal consulted for multiple electrolyte abnormalities.  She states she wants to urinate.  Denies any N/V/SOB.  Eating. States she had edema.          PAST MEDICAL & SURGICAL HISTORY:  Anemia      Depression      Chronic musculoskeletal pain      Anorexia  at age 15 yrs      No significant past surgical history           FAMILY HISTORY:  No pertinent family history in first degree relatives    [place]    Social History:Non smoker    MEDICATIONS  (STANDING):  dextrose 50% Injectable 25 Gram(s) IV Push once  dextrose 50% Injectable 12.5 Gram(s) IV Push once  dextrose 50% Injectable 25 Gram(s) IV Push once  dextrose Oral Gel 15 Gram(s) Oral once  glucagon  Injectable 1 milliGRAM(s) IntraMuscular once  levothyroxine 25 MICROGram(s) Oral daily  potassium chloride    Tablet ER 40 milliEquivalent(s) Oral once    MEDICATIONS  (PRN):  melatonin 3 milliGRAM(s) Oral at bedtime PRN Insomnia   Meds reviewed    Allergies    No Known Allergies    Intolerances         REVIEW OF SYSTEMS:    Review of Systems:   Constitutional: weakness  HEENT: Denies headaches and dizziness  Respiratory: denies SOB, cough, or wheezing  Cardiovascular: denies CP, palpitations  Gastrointestinal: Denies nausea, denies vomiting, diarrhea, constipation, abdominal pain, or bloody stools  Genitourinary: denies painful urination, increased frequency, urgency, or bloody urine  Skin: denies rashes or itching  Musculoskeletal: denies muscle aches, joint swelling  Neurologic: Denies generalized weakness, denies loss of sensation, numbness, or tingling      ICU Vital Signs Last 24 Hrs  T(C): 36.2 (28 Aug 2023 12:00), Max: 36.6 (27 Aug 2023 20:53)  T(F): 97.2 (28 Aug 2023 12:00), Max: 97.8 (27 Aug 2023 20:53)  HR: 93 (28 Aug 2023 15:00) (51 - 107)  BP: 110/77 (28 Aug 2023 15:00) (91/72 - 117/80)  BP(mean): 89 (28 Aug 2023 15:00) (75 - 93)  ABP: --  ABP(mean): --  RR: 17 (28 Aug 2023 15:00) (17 - 46)  SpO2: 100% (28 Aug 2023 15:00) (68% - 100%)    O2 Parameters below as of 28 Aug 2023 15:00  Patient On (Oxygen Delivery Method): ventilator    O2 Concentration (%): 30        PHYSICAL EXAM:    GENERAL: cachectic, fraill appearing  HEAD:  Atraumatic, Normocephalic  EYES: EOMI, conjunctiva and sclera clear  ENMT: No Drainage from nares, No drainage from ears  NECK: Supple, neck  veins full  NERVOUS SYSTEM:  Awake and Alert  CHEST/LUNG: Clear to percussion bilaterally; No rales, rhonchi, wheezing, or rubs  HEART: Regular rate and rhythm; No murmurs, rubs, or gallops  ABDOMEN: Soft, Nontender, Nondistended; Bowel sounds present  EXTREMITIES:  No Edema  SKIN: + Ecchoymosis LE      LABS:                        9.1    2.06  )-----------( 112      ( 28 Aug 2023 07:31 )             25.8     08-28    134<L>  |  105  |  44<H>  ----------------------------<  770<HH>  3.3<L>   |  19<L>  |  0.82    Ca    6.2<LL>      28 Aug 2023 09:35  Phos  3.5     08-28  Mg     2.1     08-28    TPro  4.3<L>  /  Alb  2.6<L>  /  TBili  1.4<H>  /  DBili  x   /  AST  753<H>  /  ALT  403<H>  /  AlkPhos  68  08-28    PT/INR - ( 28 Aug 2023 09:00 )   PT: 17.4 sec;   INR: 1.50 ratio         PTT - ( 28 Aug 2023 09:00 )  PTT:29.3 sec  Urinalysis Basic - ( 28 Aug 2023 09:35 )    Color: x / Appearance: x / SG: x / pH: x  Gluc: 770 mg/dL / Ketone: x  / Bili: x / Urobili: x   Blood: x / Protein: x / Nitrite: x   Leuk Esterase: x / RBC: x / WBC x   Sq Epi: x / Non Sq Epi: x / Bacteria: x        ABG - ( 28 Aug 2023 08:52 )  pH, Arterial: 7.32  pH, Blood: x     /  pCO2: 31    /  pO2: 317   / HCO3: 16    / Base Excess: -10.1 /  SaO2: x

## 2023-08-28 NOTE — DIETITIAN INITIAL EVALUATION ADULT - ENTERAL
When medically feasible; recommend initiating tube feedings of Vital 1.5 @ 10ml/hr and increase by 10ml q24hrs until goal rate of 40ml/hr x 20hrs (to provide 800ml total volume formula, 1200kcal, 54gm protein, 608ml water) + free water flush per ICU team

## 2023-08-28 NOTE — PROGRESS NOTE ADULT - SUBJECTIVE AND OBJECTIVE BOX
INTERVAL HPI/OVERNIGHT EVENTS: Patient recently admitted this morning s/p RRT for hypoglycemia and bradycardia    SUBJECTIVE: Seen and examined pt at bedside. Patient sedated and intubated.    Review of Systems:  Unable to obtain    ICU Vital Signs Last 24 Hrs  T(C): 36.2 (28 Aug 2023 12:00), Max: 36.6 (27 Aug 2023 20:53)  T(F): 97.2 (28 Aug 2023 12:00), Max: 97.8 (27 Aug 2023 20:53)  HR: 86 (28 Aug 2023 13:00) (51 - 107)  BP: 107/86 (28 Aug 2023 13:00) (91/72 - 117/80)  BP(mean): 93 (28 Aug 2023 13:00) (75 - 93)  ABP: --  ABP(mean): --  RR: 45 (28 Aug 2023 13:00) (18 - 46)  SpO2: 100% (28 Aug 2023 13:00) (68% - 100%)    O2 Parameters below as of 28 Aug 2023 13:00  Patient On (Oxygen Delivery Method): ventilator    O2 Concentration (%): 30        Mode: AC/ CMV (Assist Control/ Continuous Mandatory Ventilation), RR (machine): 16, TV (machine): 300, FiO2: 30, PEEP: 5  08-27-23 @ 07:01  -  08-28-23 @ 07:00  --------------------------------------------------------  IN: 2.5 mL / OUT: 0 mL / NET: 2.5 mL    08-28-23 @ 07:01  -  08-28-23 @ 13:17  --------------------------------------------------------  IN: 696.9 mL / OUT: 1750 mL / NET: -1053.1 mL        CAPILLARY BLOOD GLUCOSE      POCT Blood Glucose.: >600 mg/dL (28 Aug 2023 11:29)      I&O's Summary    27 Aug 2023 07:01  -  28 Aug 2023 07:00  --------------------------------------------------------  IN: 2.5 mL / OUT: 0 mL / NET: 2.5 mL    28 Aug 2023 07:01  -  28 Aug 2023 13:17  --------------------------------------------------------  IN: 696.9 mL / OUT: 1750 mL / NET: -1053.1 mL        PHYSICAL EXAM:  General: No acute distress.  Currently sedated, small body habitus    HEENT: Pupils equal, reactive to light.  Symmetric.    PULM: Clear to auscultation bilaterally, no significant sputum production    CVS: Regular rate and rhythm, no murmurs, rubs, or gallops    ABD: Soft, nondistended, nontender, normoactive bowel sounds, no masses    EXT: No edema, nontender    SKIN: Warm and well perfused, no rashes noted.    NEURO: A&Ox0, chemically sedated    Meds:      dextrose 50% Injectable IV Push  dextrose 50% Injectable IV Push  dextrose 50% Injectable IV Push  dextrose Oral Gel Oral  glucagon  Injectable IntraMuscular  levothyroxine Injectable IV Push      propofol Infusion IV Continuous      heparin   Injectable SubCutaneous    famotidine Injectable IV Push      lactated ringers. IV Continuous  thiamine IVPB IV Intermittent      chlorhexidine 0.12% Liquid Oral Mucosa  chlorhexidine 2% Cloths Topical                              9.1    2.06  )-----------( 112      ( 28 Aug 2023 07:31 )             25.8       08-28    134<L>  |  105  |  44<H>  ----------------------------<  770<HH>  3.3<L>   |  19<L>  |  0.82    Ca    6.2<LL>      28 Aug 2023 09:35  Phos  3.5     08-28  Mg     2.1     08-28    TPro  4.3<L>  /  Alb  2.6<L>  /  TBili  1.4<H>  /  DBili  x   /  AST  753<H>  /  ALT  403<H>  /  AlkPhos  68  08-28    Lactate 1.9           08-28 @ 07:31      CARDIAC MARKERS ( 28 Aug 2023 07:31 )  x     / x     / 320 U/L / x     / 15.4 ng/mL      PT/INR - ( 28 Aug 2023 09:00 )   PT: 17.4 sec;   INR: 1.50 ratio         PTT - ( 28 Aug 2023 09:00 )  PTT:29.3 sec  Urinalysis Basic - ( 28 Aug 2023 09:35 )    Color: x / Appearance: x / SG: x / pH: x  Gluc: 770 mg/dL / Ketone: x  / Bili: x / Urobili: x   Blood: x / Protein: x / Nitrite: x   Leuk Esterase: x / RBC: x / WBC x   Sq Epi: x / Non Sq Epi: x / Bacteria: x                  Radiology:    Bedside Ultrasound: Patent IVC    Tubes/Lines: Intubated, Propofol drip      GLOBAL ISSUE/BEST PRACTICE:  Analgesia:  Sedation:  HOB elevation: Y  Stress ulcer prophylaxis:  VTE prophylaxis:  Glycemic control:  Nutrition:    CODE STATUS:        INTERVAL HPI/OVERNIGHT EVENTS: Patient recently admitted this morning s/p RRT for hypoglycemia and bradycardia    SUBJECTIVE: Seen and examined pt at bedside. Patient sedated and intubated.    Review of Systems:  Unable to obtain    ICU Vital Signs Last 24 Hrs  T(C): 36.2 (28 Aug 2023 12:00), Max: 36.6 (27 Aug 2023 20:53)  T(F): 97.2 (28 Aug 2023 12:00), Max: 97.8 (27 Aug 2023 20:53)  HR: 86 (28 Aug 2023 13:00) (51 - 107)  BP: 107/86 (28 Aug 2023 13:00) (91/72 - 117/80)  BP(mean): 93 (28 Aug 2023 13:00) (75 - 93)  ABP: --  ABP(mean): --  RR: 45 (28 Aug 2023 13:00) (18 - 46)  SpO2: 100% (28 Aug 2023 13:00) (68% - 100%)    O2 Parameters below as of 28 Aug 2023 13:00  Patient On (Oxygen Delivery Method): ventilator    O2 Concentration (%): 30        Mode: AC/ CMV (Assist Control/ Continuous Mandatory Ventilation), RR (machine): 16, TV (machine): 300, FiO2: 30, PEEP: 5  08-27-23 @ 07:01  -  08-28-23 @ 07:00  --------------------------------------------------------  IN: 2.5 mL / OUT: 0 mL / NET: 2.5 mL    08-28-23 @ 07:01  -  08-28-23 @ 13:17  --------------------------------------------------------  IN: 696.9 mL / OUT: 1750 mL / NET: -1053.1 mL        CAPILLARY BLOOD GLUCOSE      POCT Blood Glucose.: >600 mg/dL (28 Aug 2023 11:29)      I&O's Summary    27 Aug 2023 07:01  -  28 Aug 2023 07:00  --------------------------------------------------------  IN: 2.5 mL / OUT: 0 mL / NET: 2.5 mL    28 Aug 2023 07:01  -  28 Aug 2023 13:17  --------------------------------------------------------  IN: 696.9 mL / OUT: 1750 mL / NET: -1053.1 mL        PHYSICAL EXAM:  General: No acute distress.  Currently sedated, small body habitus    HEENT: Pupils equal, reactive to light.  Symmetric.    PULM: Clear to auscultation bilaterally, no significant sputum production    CVS: Regular rate and rhythm, no murmurs, rubs, or gallops    ABD: Soft, nondistended, nontender, normoactive bowel sounds, no masses    EXT: No edema, nontender    SKIN: Warm and well perfused, no rashes noted.    NEURO: A&Ox0, chemically sedated    Meds:      dextrose 50% Injectable IV Push  dextrose 50% Injectable IV Push  dextrose 50% Injectable IV Push  dextrose Oral Gel Oral  glucagon  Injectable IntraMuscular  levothyroxine Injectable IV Push      propofol Infusion IV Continuous      heparin   Injectable SubCutaneous    famotidine Injectable IV Push      lactated ringers. IV Continuous  thiamine IVPB IV Intermittent      chlorhexidine 0.12% Liquid Oral Mucosa  chlorhexidine 2% Cloths Topical                              9.1    2.06  )-----------( 112      ( 28 Aug 2023 07:31 )             25.8       08-28    134<L>  |  105  |  44<H>  ----------------------------<  770<HH>  3.3<L>   |  19<L>  |  0.82    Ca    6.2<LL>      28 Aug 2023 09:35  Phos  3.5     08-28  Mg     2.1     08-28    TPro  4.3<L>  /  Alb  2.6<L>  /  TBili  1.4<H>  /  DBili  x   /  AST  753<H>  /  ALT  403<H>  /  AlkPhos  68  08-28    Lactate 1.9           08-28 @ 07:31      CARDIAC MARKERS ( 28 Aug 2023 07:31 )  x     / x     / 320 U/L / x     / 15.4 ng/mL      PT/INR - ( 28 Aug 2023 09:00 )   PT: 17.4 sec;   INR: 1.50 ratio         PTT - ( 28 Aug 2023 09:00 )  PTT:29.3 sec  Urinalysis Basic - ( 28 Aug 2023 09:35 )    Color: x / Appearance: x / SG: x / pH: x  Gluc: 770 mg/dL / Ketone: x  / Bili: x / Urobili: x   Blood: x / Protein: x / Nitrite: x   Leuk Esterase: x / RBC: x / WBC x   Sq Epi: x / Non Sq Epi: x / Bacteria: x                  Radiology:    Bedside Ultrasound: Patent IVC    Tubes/Lines: Intubated, Propofol drip      GLOBAL ISSUE/BEST PRACTICE:  Analgesia: N/A  Sedation: Propofol  HOB elevation: Y  Stress ulcer prophylaxis: PPI  VTE prophylaxis: Heparin  Glycemic control: n/a  Nutrition: Sedated    CODE STATUS: Full code

## 2023-08-28 NOTE — PROGRESS NOTE ADULT - ASSESSMENT
Pt is a 54 y/o F pmhx of anemia, hypothyroidism, osteoporosis, depression, anxiety, chronic hyponatremia, w/ multiple admissions for malnutrition failure to thrive presents to [facility] ED w/ complaints of weakness and hypoglycemia.  RRT called for bradycardia/hypoglycemia, currently admitted to MICU    1. Acute hypoxic respiratory failure   2. Bradycardia   3. Hypoglycemia   4. Malnutrition   5. Transaminitis   6. Hypocalcemia   7. Anemia   8. Shock     Plan:     Neuro:   Intubated on vent, CXR confirmed tube placement, ABG shows improved oxygenation status   Currently sedated on propofol, will wean as tolerated to assess mentation    CV:   Shock state, s/p Atropine for bradycardia   No pressors currently, goal MAP >65  Elevated troponins, likely in setting of shock state  monitoring for signs of bradycardia and will use atropine if needed.     Pulm:   Acute hypoxic respiratory failure, intubated for airway protection,   will use Low TV ventilation using 4-6cc/kg of IBW for lung protective strategies, will titrate FiO2 to maintain SpO2>92%, PEEP to facilitate ventilation.   ET tube pulled back after being found in R mainstem on Xray. ABG improved, titrated FiO2 down to 30%.     GI: Diet:   Elevated LFTs, likely due to shock state  Hypoalbuminia in setting of chronic malnutrition  NG tube placed  RUQ U/S to be performed  Protonix for GI PPX     Renal:  Elevated BUN  Maintenance Lactated ringers at 70 cc/hr  Repeat labs  continue to monitor and avoid nephrotoxic meds.     Endo:   Hypoglycemia likely secondary to malnutrition,   s/p 2 amps of D10 and 1 amp of D5, now w/ hyperglycemia,   Q3H FS   Hypocalcemia, hyperparathyroidism, will give 2gm calcium, and 3 runs of KCL given likely hypokalemia in setting of hyperglycemia.   Levothyroxine switched to IV  High dose IV thiamine supplementation ordered    Heme: Anemia, s/p  unit RBC tranfusion, Heparin DVT ppx, transfuse for Hb<7    ID: No active issues, continue to monitor and trend markers of infection daily.    Pt is a 52 y/o F pmhx of anemia, hypothyroidism, osteoporosis, depression, anxiety, chronic hyponatremia, w/ multiple admissions for malnutrition failure to thrive presents to [facility] ED w/ complaints of weakness and hypoglycemia.  RRT called for bradycardia/hypoglycemia, currently admitted to MICU    1. Acute hypoxic respiratory failure   2. Bradycardia   3. Hypoglycemia   4. Malnutrition   5. Transaminitis   6. Hypocalcemia   7. Anemia   8. Shock     Plan:     Neuro:   Intubated on vent, CXR confirmed tube placement, ABG shows improved oxygenation status   Currently sedated on propofol, will wean as tolerated to assess mentation    CV:   Shock state, s/p Atropine for bradycardia   No pressors currently, goal MAP >65  Elevated troponins, likely in setting of shock state  monitoring for signs of bradycardia and will use atropine if needed.     Pulm:   Acute hypoxic respiratory failure, intubated for airway protection,   will use Low TV ventilation using 4-6cc/kg of IBW for lung protective strategies, will titrate FiO2 to maintain SpO2>92%, PEEP to facilitate ventilation.   ET tube pulled back after being found in R mainstem on Xray. ABG improved, titrated FiO2 down to 30%.     GI: Diet:   Elevated LFTs, likely due to shock state  Hypoalbuminia in setting of chronic malnutrition  NG tube placed  Possible future RUQ U/S as Bili elevated  Protonix for GI PPX     Renal:  Elevated BUN  Maintenance Lactated ringers at 70 cc/hr  Repeat labs  continue to monitor and avoid nephrotoxic meds.     Endo:   Hypoglycemia likely secondary to malnutrition,   s/p 2 amps of D10 and 1 amp of D5, now w/ hyperglycemia,   Q3H FS   Hypocalcemia, hyperparathyroidism, will give 2gm calcium, and 3 runs of KCL given likely hypokalemia in setting of hyperglycemia.   Levothyroxine switched to IV  High dose IV thiamine supplementation ordered    Heme: Anemia, s/p  unit RBC tranfusion, Heparin DVT ppx, transfuse for Hb<7    ID: No active issues, continue to monitor and trend markers of infection daily.

## 2023-08-28 NOTE — CONSULT NOTE ADULT - ASSESSMENT
Pt is a 52 y/o F pmhx of anemia, hypothyroidism, osteoporosis, depression, anxiety, chronic hyponatremia, w/ multiple admissions for malnutrition failure to thrive presents to [facility] ED w/ complaints of weakness and hypoglycemia.    1. Acute hypoxic respiratory failure   2. Bradycardia   3. Hypoglycemia   4. Malnutrition   5. Transaminitis   6. Hypocalcemia   7. Anemia   8. Shock     Plan:   - Will admit to MICU for further eval and management.     Neuro: Intubated on vent, hold off sedation to assess mentation.     CV: Shock state Started on levophed for HD support, monitoring for signs of bradycardia and will use atropine if needed. Titrate to maintain MAP>65.     Pulm: Acute hypoxic respiratory failure intubated for airway protection, will use Low TV ventilation using 4-6cc/kg of IBW for lung protective strategies, will titrate FiO2 to maintain SpO2>92%, PEEP to facilitate ventilation. ET tube pulled back after being found in R mainstem on Xray. ABG improved, titrated FiO2 down to 60%.     GI: Diet: NPO, protonix for GI PPX     Renal: No active issues, continue to monitor and avoid nephrotoxic meds.     Endo: Hypoglycemia likely secondary to malnutrition, given 2 amps of D10 and 1 amp of D5, now w/ hyperglycemia, will recheck finger stick in 1 hour, and BMP at 9:30 to evaluate response. Hypocalcemia will give 2gm calcium, and 3 runs of KCL given likely hypokalemia in setting of hyperglycemia.     Heme: Anemia, hold DVT ppx, transfuse for Hb<7    ID: No active issues, continue to monitor and trend markers of infection daily.     Case discussed w/ attending Dr. Jacob

## 2023-08-28 NOTE — CARE COORDINATION ASSESSMENT. - NSCAREPROVIDERS_GEN_ALL_CORE_FT
CARE PROVIDERS:  Accepting Physician: Arthur Montilla  Access Services: Hermelinda La  Administration: Tameka Reilly  Administration: Mariaelena Florence  Administration: Clotilde Seth  Administration: Tru Buenrostro  Administration: Suha Vides  Admitting: Arthur Montilla  Attending: Arthur Montilla  Consultant: Santosh Suero  Consultant: Dane Lira  Consultant: Alicia Abebe  Consultant: John Arias  Consultant: Mackenzie Turk  Covering Team: Magen Sandoval  ED ACP: Citlali Chery ED Attending: Shen Butcher ED Nurse: Kaitlin Albrecht  Nurse: Kurt Dyson  Nurse: Thaddeus Shaw  Nurse: Magen Robbins  Nurse: Nancy Ragland  Nurse: Radha Kelley  Nurse: Yolie Rojo  Nurse: Denisse Blanco  Occupational Therapy: Eloise Arroyo  Ordered: Doctor, Unknown  Ordered: ADM, User  Override: Alessia Cordova  Override: Lilliam Souza  Override: Peri Lea  PCA/Nursing Assistant: Elis Batres  Primary Team: Madonna Jacob  Primary Team: Karly Gamboa  Primary Team: Arthur Montilla  Primary Team: Zeeshan Hebert  Primary Team: Kerry Watkins  Primary Team: Jase Red  Primary Team: Renetta Al  Primary Team: Raheem De La Cruz  Primary Team: Wilfredo Vazquez  Primary Team: Neema Soriano  Primary Team: Jose Ann  Primary Team: Yair Yanez  Primary Team: Latonia Dugan  Primary Team: Reece Nichols  Quality Review: Belinda Vigil  Registered Dietitian: Rosina Matos  Registered Dietitian: Jennie Lance  Respiratory Therapy: Lance Ornelas  Respiratory Therapy: Enrrique Mendes  Respiratory Therapy: Sadaf Daniels  : Judy Montaño  : Frances Aviles  UR// Supp. Assoc.: Shante Sue

## 2023-08-28 NOTE — DIETITIAN INITIAL EVALUATION ADULT - PERTINENT MEDS FT
MEDICATIONS  (STANDING):  chlorhexidine 0.12% Liquid 15 milliLiter(s) Oral Mucosa every 12 hours  chlorhexidine 2% Cloths 1 Application(s) Topical <User Schedule>  dextrose 50% Injectable 25 Gram(s) IV Push once  dextrose 50% Injectable 12.5 Gram(s) IV Push once  dextrose 50% Injectable 25 Gram(s) IV Push once  dextrose Oral Gel 15 Gram(s) Oral once  famotidine Injectable 20 milliGRAM(s) IV Push daily  glucagon  Injectable 1 milliGRAM(s) IntraMuscular once  heparin   Injectable 5000 Unit(s) SubCutaneous every 12 hours  lactated ringers. 1000 milliLiter(s) (70 mL/Hr) IV Continuous <Continuous>  levothyroxine Injectable 18.5 MICROGram(s) IV Push at bedtime  propofol Infusion 10 MICROgram(s)/kG/Min (1.6 mL/Hr) IV Continuous <Continuous>  thiamine IVPB 500 milliGRAM(s) IV Intermittent three times a day    MEDICATIONS  (PRN):

## 2023-08-28 NOTE — DIETITIAN INITIAL EVALUATION ADULT - PROBLEM SELECTOR PLAN 2
on arrival, Na 129, K 2.7, Ca 7.2.     - encourage PO intake  - replete as necessary, K  - f/u BMP, Vitamin D, PTH, Mag  -nephro consulted

## 2023-08-28 NOTE — CARE COORDINATION ASSESSMENT. - OTHER PERTINENT REFERRAL INFORMATION
Pt currently on vent support. Pt sister at bedside. Pta pt living alone and has medicaid aide 12 hr weekly thr company called [employer]. Pt reportedly had gone to outpt PT. Per sister, pt does not leave the house much, has had falls lately at home. Pt reportedly does not have a therapist or psychiatrist in community. Pt was seen by behavioral health in ed when she arrived. Per sister she refuses to take any psych meds. sw to continue to follow for needs/plans.

## 2023-08-28 NOTE — DIETITIAN INITIAL EVALUATION ADULT - PROBLEM SELECTOR PLAN 5
TSH 11.4, pt endorses being very cold    - f/u ft4, ft3  - start levothyroxine 25 mcg daily, increase as necessary

## 2023-08-28 NOTE — DIETITIAN INITIAL EVALUATION ADULT - ORAL INTAKE PTA/DIET HISTORY
Nutrition-related hx obtained from pt's sister. Pt was at [facility] since April. Has aides. Does own grocery shopping; typically purchases healthy foods such as broccoli, cauliflower, yogurt, fish, eggs, etc. Sister states that purchased food tends to go bad. Pt not eating. Pt has hx of disordered eating since the age of 6. Anorexia at age 15. As per behavioral health note, when pt asked about eating habits pt states " she is "particular," and does not eat meat, eats dairy products, vegetables. Denies calorie count or measure serving sizes but evasive when asked to estimate amount eaten."

## 2023-08-28 NOTE — CHART NOTE - NSCHARTNOTEFT_GEN_A_CORE
Resident Rapid Response Note    Patient is a 53y old  Female who presents with a chief complaint of fall, pancytopenia (27 Aug 2023 19:43)      Rapid response was called at on this 53y Female patient for unresponsiveness and hypoglycemia. Patient did not receive any insulin prior to rapid response. She was noted to be unresponsive by RN with a BS of 19 after immediately receiving 1 amp D5. HR was decreased to 30s-40s. BP was decreased to 70/40 as well. Patient did not respond to any questionioining or sternal rub prior to rapid response. Of note patient was admitted with hypoglycemia and s/p fall with failure to thrive.     Patient was seen and examined at the bedside by the rapid response team and primary team. Dr. Gamboa at at bedside.    Rapid Response Vital Signs:  BP:  HR:  RR:  SpO2: % on   Temp:  FS:    Vital Signs Last 24 Hrs  T(C): 36.6 (28 Aug 2023 06:14), Max: 36.7 (27 Aug 2023 11:08)  T(F): 97.8 (28 Aug 2023 06:14), Max: 98.1 (27 Aug 2023 11:08)  HR: 51 (28 Aug 2023 06:14) (51 - 84)  BP: 106/78 (28 Aug 2023 06:14) (106/78 - 125/87)  BP(mean): --  RR: 18 (28 Aug 2023 06:14) (18 - 20)  SpO2: 93% (28 Aug 2023 06:14) (93% - 100%)    Parameters below as of 28 Aug 2023 06:14  Patient On (Oxygen Delivery Method): room air        Physical Exam:  General: Well developed, well nourished, in no acute distress  HEENT: NCAT, PERRLA, EOMI bl, moist mucous membranes   Neck: Supple, nontender, no mass  Neurology: A&Ox3, nonfocal, CN II-XII grossly intact, sensation intact, no gait abnormalities   Respiratory: CTA B/L, No wheezing, rales, or rhonchi  CV: RRR, S1/S2 present, no murmurs, rubs, or gallops  Abdominal: Soft, nontender, non-distended, normoactive bowel sounds  Extremities: No C/C/E, peripheral pulses present  MSK: Normal ROM, no joint erythema or warmth, no joint swelling   Skin: warm, dry, normal color, no obvious rash or abnormal lesions    LABS:                        6.9    x     )-----------( x        ( 27 Aug 2023 18:14 )             18.7     27 Aug 2023 12:20    129    |  98     |  55     ----------------------------<  209    2.7     |  25     |  0.53     Ca    7.2        27 Aug 2023 12:20  Phos  3.1       27 Aug 2023 18:14  Mg     2.0       27 Aug 2023 18:14    TPro  6.1    /  Alb  3.5    /  TBili  0.9    /  DBili  x      /  AST  142    /  ALT  90     /  AlkPhos  59     27 Aug 2023 12:20      Urinalysis Basic - ( 27 Aug 2023 13:20 )    Color: Yellow / Appearance: Clear / S.009 / pH: x  Gluc: x / Ketone: Negative mg/dL  / Bili: Negative / Urobili: 0.2 mg/dL   Blood: x / Protein: Negative mg/dL / Nitrite: Negative   Leuk Esterase: Trace / RBC: 0 /HPF / WBC 2 /HPF   Sq Epi: x / Non Sq Epi: x / Bacteria: x      CAPILLARY BLOOD GLUCOSE      POCT Blood Glucose.: 503 mg/dL (28 Aug 2023 06:52)  POCT Blood Glucose.: <10 mg/dL (28 Aug 2023 06:47)  POCT Blood Glucose.: 16 mg/dL (28 Aug 2023 06:43)  POCT Blood Glucose.: 156 mg/dL (27 Aug 2023 13:26)  POCT Blood Glucose.: 171 mg/dL (27 Aug 2023 11:18)        RADIOLOGY & ADDITIONAL TESTS:      Assessment/Plan:  - S/P 5 units of D5 with Improvement of BS to 500s  - S/P Atropine x1 with improvement of HR to 70s   - Further management as per ICU       -Will continue to follow, RN to call if any changes. Resident Rapid Response Note    Patient is a 53y old  Female who presents with a chief complaint of fall, pancytopenia (27 Aug 2023 19:43)      Rapid response was called at on this 53y Female patient for unresponsiveness and hypoglycemia. Patient did not receive any insulin prior to rapid response. She was noted to be unresponsive by RN with a BS of 19 after immediately receiving 1 amp D5. HR was decreased to 30s-40s. BP was decreased to 70/40 as well. Blood sugar of 16. Patient did not respond to any questionioining or sternal rub prior to rapid response. Of note patient was admitted with hypoglycemia and s/p fall with failure to thrive. Pt BP 92/54 HR 86 s/p atropine 1x and 2x D50.     Patient was seen and examined at the bedside by the rapid response team and primary team. Dr. Gamboa at at bedside.    Rapid Response Vital Signs:  BP: 72/48  HR:56      Vital Signs Last 24 Hrs  T(C): 36.6 (28 Aug 2023 06:14), Max: 36.7 (27 Aug 2023 11:08)  T(F): 97.8 (28 Aug 2023 06:14), Max: 98.1 (27 Aug 2023 11:08)  HR: 51 (28 Aug 2023 06:14) (51 - 84)  BP: 106/78 (28 Aug 2023 06:14) (106/78 - 125/87)  BP(mean): --  RR: 18 (28 Aug 2023 06:14) (18 - 20)  SpO2: 93% (28 Aug 2023 06:14) (93% - 100%)    Parameters below as of 28 Aug 2023 06:14  Patient On (Oxygen Delivery Method): room air        Physical Exam:  General: Well developed, well nourished, in no acute distress  HEENT: NCAT, PERRLA, EOMI bl, moist mucous membranes   Neck: Supple, nontender, no mass  Neurology: A&Ox3, nonfocal, CN II-XII grossly intact, sensation intact, no gait abnormalities   Respiratory: CTA B/L, No wheezing, rales, or rhonchi  CV: RRR, S1/S2 present, no murmurs, rubs, or gallops  Abdominal: Soft, nontender, non-distended, normoactive bowel sounds  Extremities: No C/C/E, peripheral pulses present  MSK: Normal ROM, no joint erythema or warmth, no joint swelling   Skin: warm, dry, normal color, no obvious rash or abnormal lesions    LABS:                        6.9    x     )-----------( x        ( 27 Aug 2023 18:14 )             18.7     27 Aug 2023 12:20    129    |  98     |  55     ----------------------------<  209    2.7     |  25     |  0.53     Ca    7.2        27 Aug 2023 12:20  Phos  3.1       27 Aug 2023 18:14  Mg     2.0       27 Aug 2023 18:14    TPro  6.1    /  Alb  3.5    /  TBili  0.9    /  DBili  x      /  AST  142    /  ALT  90     /  AlkPhos  59     27 Aug 2023 12:20      Urinalysis Basic - ( 27 Aug 2023 13:20 )    Color: Yellow / Appearance: Clear / S.009 / pH: x  Gluc: x / Ketone: Negative mg/dL  / Bili: Negative / Urobili: 0.2 mg/dL   Blood: x / Protein: Negative mg/dL / Nitrite: Negative   Leuk Esterase: Trace / RBC: 0 /HPF / WBC 2 /HPF   Sq Epi: x / Non Sq Epi: x / Bacteria: x      CAPILLARY BLOOD GLUCOSE      POCT Blood Glucose.: 503 mg/dL (28 Aug 2023 06:52)  POCT Blood Glucose.: <10 mg/dL (28 Aug 2023 06:47)  POCT Blood Glucose.: 16 mg/dL (28 Aug 2023 06:43)  POCT Blood Glucose.: 156 mg/dL (27 Aug 2023 13:26)  POCT Blood Glucose.: 171 mg/dL (27 Aug 2023 11:18)        RADIOLOGY & ADDITIONAL TESTS:      Assessment/Plan:  - S/P 5 units of D5 with Improvement of BS to 500s  - S/P Atropine x1 with improvement of HR to 70s   - Further management as per ICU       -Will continue to follow, RN to call if any changes.

## 2023-08-28 NOTE — DIETITIAN INITIAL EVALUATION ADULT - ADD RECOMMEND
Recommend thiamine 200mg daily x 10 days. MVI/minerals daily (centrum liquid via NGT). Recommend monitoring electrolytes closely with replacement prn.

## 2023-08-28 NOTE — CONSULT NOTE ADULT - ASSESSMENT
[ASSESSMENT and  PLAN]  chronic pancytopenia  Non-compliance  Osteoporosis  Severe malnutrition  Hypogylcemia      ***************************************************************  INCOMPLETE NOTE.  Documentation in Progress  PT SEEN AND EVALUATED.   FULL/ADDITIONAL RECOMMENDATIONS TO FOLLOW   ***************************************************************      RECOMMENDATIONS    Supportive measures from Heme perspective.     d/w pt sister.     Transfuse PRBC as clinically indicated.   Transfuse PRBC if Hgb <7.0 or if symptomatic.   Follow CBC    Supportive measures for nutrition.       DVT Prophylaxis  SCD  Wt 60lbs.     Thank you for consulting us.     Discussed plan of care with patient and or family in detail.   They expressed understanding of the treatment plan.   Risks, benefits and alternatives discussed in detail.   Opportunity given for questions and discussion.   Any questions or concerns all addressed and answered to their satisfaction, and in lay terms.     Discussed with  xxxxxxx.    > xxxxxx minutes spent in direct patient care, examining and counseling patient,  reviewing  the notes, lab data/ imaging , discussion with multidisciplinary team.      [ASSESSMENT and  PLAN]  D61.818  Pancytopenia   Z91.19   Non-compliance  M81.0    Osteoporosis  R62.51   Failure to thrive  E43        Severe malnutrition  E16.2     Hypogylcemia    52yo  ([facility]) F PMH of osteoporosis, had fall with R hip ([facility]) and clavicle fx in 2015.   Had refused bone density testing prior. In Feb had worse back pain.   Had MRI of L spine 05/2017 and told had 5 compression fractures.   Had been recommended to get T spine CYNTHIA but pt has not gone.   Pt had seen 3 orthopaedic surgeons and all told had osteoporosis.   Saw Dr King, orthopaedic surgeon initially.   Saw 2nd orthopaedic surgeon, Recommended to undergo Ryan, Dr Watson.   Finally saw Dr Lester, in [place], orthopaedic surgeon. ([facility])    Pt known to me, last seen in office 2019, when last seen for hematology. Non-compliant with medical followup.  At time evaluated for anemia and leukopenia  WBC 2.1. Hgb 7.5g/dL. plts 197  BMbx was offered but declined by pt.   Workup negative for hemolysis, MGUS, B12, Folate, or iron def.   negative Flow cytometry and Cytogenetics/FISH    Now with admission for FTT [failure to Thrive]  Hypoglycemia, fall.   Pt marked malnutrition, quite skeletal.   At current time, pt main issue in malnutrition.     Hematological issues, not acute.   Hx of Zinc and Copper deficiencies.       RECOMMENDATIONS    Supportive measures from Heme perspective.   d/w pt sister.     Would not recommend BM bx workup at this juncture.     Empiric treatment with MV with minerals, but would discuss with nutrition regarding treatment for pt with such severe malnutrition.   pt at risk for electrolyte abnormalities with re-feeding.     Transfuse PRBC as clinically indicated.   Transfuse PRBC if Hgb <7.0 or if symptomatic.   Follow CBC    Supportive measures for nutrition.     Hospice consultation would not be inappropriate if pt fails to improve, if fails to eat.     DVT Prophylaxis  SCD  Wt 60lbs.     Discussed plan of care with sister in detail.   Dtr expressed understanding of the treatment plan.   Risks, benefits and alternatives discussed in detail.   Opportunity given for questions and discussion.   Any questions or concerns all addressed and answered to their satisfaction, and in lay terms.     Thank you for consulting us.   No additional recommendations at current time.   Will sign off on case for now.   Please call, or re-consult if needed.    [ASSESSMENT and  PLAN]  D61.818  Pancytopenia   Z91.19   Non-compliance  M81.0    Osteoporosis  R62.51   Failure to thrive  E43        Severe malnutrition  E16.2     Hypogylcemia    54yo  ([facility]) F PMH of osteoporosis, had fall with R hip ([facility]) and clavicle fx in 2015.   Had refused bone density testing prior. In Feb had worse back pain.   Had MRI of L spine 05/2017 and told had 5 compression fractures.   Had been recommended to get T spine CYNTHIA but pt has not gone.   Pt had seen 3 orthopaedic surgeons and all told had osteoporosis.   Saw Dr King, orthopaedic surgeon initially.   Saw 2nd orthopaedic surgeon, Recommended to undergo Rafaeleo, Dr Watson.   Finally saw Dr Lester, in [place], orthopaedic surgeon. ([facility])    Pt known to me, last seen in office 2019, when last seen for hematology. Non-compliant with medical followup.  At time evaluated for anemia and leukopenia  WBC 2.1. Hgb 7.5g/dL. plts 197  BMbx was offered but declined by pt.   Workup negative for hemolysis, MGUS, B12, Folate, or iron def.   negative Flow cytometry and Cytogenetics/FISH    Now with admission for FTT [failure to Thrive]  Baseline weight 77 lbs in 2017, often refused weight in office.   Hypoglycemia, fall.   Pt marked malnutrition, quite skeletal.   At current time, pt main issue in malnutrition.     Hematological issues, not acute.   Hx of Zinc and Copper deficiencies.     hx of refusal for medical diagnostic and therapeutic measures.     RECOMMENDATIONS    Supportive measures from Heme perspective.   d/w pt sister.     Would not recommend BM bx workup at this juncture.     Empiric treatment with MV with minerals, but would discuss with nutrition regarding treatment for pt with such severe malnutrition.   pt at risk for electrolyte abnormalities with re-feeding.     Transfuse PRBC as clinically indicated.   Transfuse PRBC if Hgb <7.0 or if symptomatic.   Follow CBC    Supportive measures for nutrition.     Hospice consultation would not be inappropriate if pt fails to improve, if fails to eat.     DVT Prophylaxis  SCD  Wt 60lbs.     Pt seen by tele- psychiatry and deemed lacked capacity this admission.   Psychiatry re- eval when extubated would be     Discussed plan of care with sister in detail.   Dtr expressed understanding of the treatment plan.   Risks, benefits and alternatives discussed in detail.   Opportunity given for questions and discussion.   Any questions or concerns all addressed and answered to their satisfaction, and in lay terms.     Thank you for consulting us.   No additional recommendations at current time.   Will sign off on case for now.   Please call, or re-consult if needed.    [ASSESSMENT and  PLAN]  D61.818  Pancytopenia   Z91.19   Non-compliance  M81.0    Osteoporosis  R62.51   Failure to thrive  E43        Severe malnutrition  E16.2     Hypogylcemia    54yo  ([facility]) F PMH of osteoporosis, had fall with R hip ([facility]) and clavicle fx in 2015.   Had refused bone density testing prior. In Feb had worse back pain.   Had MRI of L spine 05/2017 and told had 5 compression fractures.   Had been recommended to get T spine CYNTHIA but pt has not gone.   Pt had seen 3 orthopaedic surgeons and all told had osteoporosis.   Saw Dr King, orthopaedic surgeon initially.   Saw 2nd orthopaedic surgeon, Recommended to undergo Ryan, Dr Watson.   Finally saw Dr Lester, in [place], orthopaedic surgeon. ([facility])    Pt known to me, last seen in office 2019, when last seen for hematology. Non-compliant with medical followup.  At time evaluated for anemia and leukopenia  WBC 2.1. Hgb 7.5g/dL. plts 197  BMbx was offered but declined by pt.   Workup negative for hemolysis, MGUS, B12, Folate, or iron def.   negative Flow cytometry and Cytogenetics/FISH    Now with admission for FTT [failure to Thrive]  Baseline weight 77 lbs in 2017, often refused weight in office.   Hypoglycemia, fall.   Pt marked malnutrition, quite skeletal.   At current time, pt main issue in malnutrition.     Hematological issues, not acute.   Hx of Zinc and Copper deficiencies.     hx of refusal for medical diagnostic and therapeutic measures.     Limited non-contrast CT scan CAP with no TESSA, no overt masses.     Known hx of renal stones prior  RECOMMENDATIONS    Supportive measures from Heme perspective.   d/w pt sister.     Would not recommend BM bx workup at this juncture.   Hematological issues not acute.   Would not change acute management.     Empiric treatment with MV with minerals, but would discuss with nutrition regarding treatment for pt with such severe malnutrition.   pt at risk for electrolyte abnormalities with re-feeding.     Transfuse PRBC as clinically indicated.   Transfuse PRBC if Hgb <7.0 or if symptomatic.   Follow CBC    Supportive measures for nutrition.   mgmt of nutrition per MICU    Hospice consultation would not be inappropriate if pt fails to improve, if fails to eat.     DVT Prophylaxis  SCD  Wt 60lbs.     Pt seen by tele- psychiatry and deemed lacked capacity this admission.   Psychiatry re- eval when extubated would be     Discussed plan of care with sister in detail.   Dtr expressed understanding of the treatment plan.   Risks, benefits and alternatives discussed in detail.   Opportunity given for questions and discussion.   Any questions or concerns all addressed and answered to their satisfaction, and in lay terms.     Thank you for consulting us.   No additional recommendations at current time.   Will sign off on case for now.   Please call, or re-consult if needed.

## 2023-08-28 NOTE — CHART NOTE - NSCHARTNOTEFT_GEN_A_CORE
: Madonna Jacob    INDICATION: Bradycardia, hypoxia    PROCEDURE:  [x] LIMITED ECHO  [x] LIMITED CHEST  [ ] LIMITED RETROPERITONEAL  [ ] LIMITED ABDOMINAL  [ ] LIMITED DVT  [ ] NEEDLE GUIDANCE VASCULAR  [ ] NEEDLE GUIDANCE THORACENTESIS  [ ] NEEDLE GUIDANCE PARACENTESIS  [ ] NEEDLE GUIDANCE PERICARDIOCENTESIS  [ ] OTHER    FINDINGS: Grossly normal LVSF LV>RV IVC with >50% respiratory variation no pericardial effusion; a line predominant b/l with focal LLL b lines; no pleural effusions; b/l pleural sliding present      INTERPRETATION: Grossly normal cardiac function; no PNA or atelectasis. No Pneumothorax

## 2023-08-28 NOTE — CONSULT NOTE ADULT - SUBJECTIVE AND OBJECTIVE BOX
Patient is a 53y old  Female who presents with a chief complaint of fall, pancytopenia (27 Aug 2023 19:43)      BRIEF HOSPITAL COURSE: ***    Events last 24 hours: ***    PAST MEDICAL & SURGICAL HISTORY:  Anemia      Depression      Chronic musculoskeletal pain      Anorexia  at age 15 yrs      No significant past surgical history          Review of Systems:  CONSTITUTIONAL: No fever, chills, or fatigue  EYES: No eye pain, visual disturbances, or discharge  ENMT:  No difficulty hearing, tinnitus, vertigo; No sinus or throat pain  NECK: No pain or stiffness  RESPIRATORY: No cough, wheezing, chills or hemoptysis; No shortness of breath  CARDIOVASCULAR: No chest pain, palpitations, dizziness, or leg swelling  GASTROINTESTINAL: No abdominal or epigastric pain. No nausea, vomiting, or hematemesis; No diarrhea or constipation. No melena or hematochezia.  GENITOURINARY: No dysuria, frequency, hematuria, or incontinence  NEUROLOGICAL: No headaches, memory loss, loss of strength, numbness, or tremors  SKIN: No itching, burning, rashes, or lesions   MUSCULOSKELETAL: No joint pain or swelling; No muscle, back, or extremity pain  PSYCHIATRIC: No depression, anxiety, mood swings, or difficulty sleeping      Medications:    norepinephrine Infusion 0.05 MICROgram(s)/kG/Min IV Continuous <Continuous>      melatonin 3 milliGRAM(s) Oral at bedtime PRN  midazolam Injectable 4 milliGRAM(s) IV Push once            dextrose 50% Injectable 25 Gram(s) IV Push once  dextrose 50% Injectable 12.5 Gram(s) IV Push once  dextrose 50% Injectable 25 Gram(s) IV Push once  dextrose Oral Gel 15 Gram(s) Oral once  glucagon  Injectable 1 milliGRAM(s) IntraMuscular once  levothyroxine 25 MICROGram(s) Oral daily        chlorhexidine 2% Cloths 1 Application(s) Topical <User Schedule>            ICU Vital Signs Last 24 Hrs  T(C): 36.6 (28 Aug 2023 06:14), Max: 36.7 (27 Aug 2023 11:08)  T(F): 97.8 (28 Aug 2023 06:14), Max: 98.1 (27 Aug 2023 11:08)  HR: 51 (28 Aug 2023 06:14) (51 - 84)  BP: 106/78 (28 Aug 2023 06:14) (106/78 - 125/87)  BP(mean): --  ABP: --  ABP(mean): --  RR: 18 (28 Aug 2023 06:14) (18 - 20)  SpO2: 93% (28 Aug 2023 06:14) (93% - 100%)    O2 Parameters below as of 28 Aug 2023 06:14  Patient On (Oxygen Delivery Method): room air                I&O's Detail        LABS:                        6.9    x     )-----------( x        ( 27 Aug 2023 18:14 )             18.7     08    129<L>  |  98  |  55<H>  ----------------------------<  209<H>  2.7<LL>   |  25  |  0.53    Ca    7.2<L>      27 Aug 2023 12:20  Phos  3.1       Mg     2.0         TPro  6.1  /  Alb  3.5  /  TBili  0.9  /  DBili  x   /  AST  142<H>  /  ALT  90<H>  /  AlkPhos  59            CAPILLARY BLOOD GLUCOSE      POCT Blood Glucose.: 503 mg/dL (28 Aug 2023 06:52)      Urinalysis Basic - ( 27 Aug 2023 13:20 )    Color: Yellow / Appearance: Clear / S.009 / pH: x  Gluc: x / Ketone: Negative mg/dL  / Bili: Negative / Urobili: 0.2 mg/dL   Blood: x / Protein: Negative mg/dL / Nitrite: Negative   Leuk Esterase: Trace / RBC: 0 /HPF / WBC 2 /HPF   Sq Epi: x / Non Sq Epi: x / Bacteria: x      CULTURES:      Physical Examination:    General: No acute distress.  Alert, oriented, interactive, nonfocal    HEENT: Pupils equal, reactive to light.  Symmetric.    PULM: Clear to auscultation bilaterally, no significant sputum production    CVS: Regular rate and rhythm, no murmurs, rubs, or gallops    ABD: Soft, nondistended, nontender, normoactive bowel sounds, no masses    EXT: No edema, nontender    SKIN: Warm and well perfused, no rashes noted.    NEURO: A&Ox3, strength 5/5 all extremities, cranial nerves grossly intact, no focal deficits      RADIOLOGY: ***      CRITICAL CARE TIME SPENT: ***  Evaluating/treating patient, reviewing data/labs/imaging, discussing case with multidisciplinary team, discussing plan/goals of care with patient/family. Non-inclusive of procedure time.   Patient is a 53y old  Female who presents with a chief complaint of fall, pancytopenia (27 Aug 2023 19:43)      BRIEF HOSPITAL COURSE: Pt is a 52 y/o F pmhx of anemia, hypothyroidism, osteoporosis, depression, anxiety, chronic hyponatremia, w/ multiple admissions for malnutrition failure to thrive presents to [facility] ED w/ complaints of weakness and hypoglycemia.     Events last 24 hours: ***    PAST MEDICAL & SURGICAL HISTORY:  Anemia      Depression      Chronic musculoskeletal pain      Anorexia  at age 15 yrs      No significant past surgical history          Review of Systems:  CONSTITUTIONAL: No fever, chills, or fatigue  EYES: No eye pain, visual disturbances, or discharge  ENMT:  No difficulty hearing, tinnitus, vertigo; No sinus or throat pain  NECK: No pain or stiffness  RESPIRATORY: No cough, wheezing, chills or hemoptysis; No shortness of breath  CARDIOVASCULAR: No chest pain, palpitations, dizziness, or leg swelling  GASTROINTESTINAL: No abdominal or epigastric pain. No nausea, vomiting, or hematemesis; No diarrhea or constipation. No melena or hematochezia.  GENITOURINARY: No dysuria, frequency, hematuria, or incontinence  NEUROLOGICAL: No headaches, memory loss, loss of strength, numbness, or tremors  SKIN: No itching, burning, rashes, or lesions   MUSCULOSKELETAL: No joint pain or swelling; No muscle, back, or extremity pain  PSYCHIATRIC: No depression, anxiety, mood swings, or difficulty sleeping      Medications:    norepinephrine Infusion 0.05 MICROgram(s)/kG/Min IV Continuous <Continuous>      melatonin 3 milliGRAM(s) Oral at bedtime PRN  midazolam Injectable 4 milliGRAM(s) IV Push once            dextrose 50% Injectable 25 Gram(s) IV Push once  dextrose 50% Injectable 12.5 Gram(s) IV Push once  dextrose 50% Injectable 25 Gram(s) IV Push once  dextrose Oral Gel 15 Gram(s) Oral once  glucagon  Injectable 1 milliGRAM(s) IntraMuscular once  levothyroxine 25 MICROGram(s) Oral daily        chlorhexidine 2% Cloths 1 Application(s) Topical <User Schedule>            ICU Vital Signs Last 24 Hrs  T(C): 36.6 (28 Aug 2023 06:14), Max: 36.7 (27 Aug 2023 11:08)  T(F): 97.8 (28 Aug 2023 06:14), Max: 98.1 (27 Aug 2023 11:08)  HR: 51 (28 Aug 2023 06:14) (51 - 84)  BP: 106/78 (28 Aug 2023 06:14) (106/78 - 125/87)  BP(mean): --  ABP: --  ABP(mean): --  RR: 18 (28 Aug 2023 06:14) (18 - 20)  SpO2: 93% (28 Aug 2023 06:14) (93% - 100%)    O2 Parameters below as of 28 Aug 2023 06:14  Patient On (Oxygen Delivery Method): room air                I&O's Detail        LABS:                        6.9    x     )-----------( x        ( 27 Aug 2023 18:14 )             18.7     08    129<L>  |  98  |  55<H>  ----------------------------<  209<H>  2.7<LL>   |  25  |  0.53    Ca    7.2<L>      27 Aug 2023 12:20  Phos  3.1       Mg     2.0         TPro  6.1  /  Alb  3.5  /  TBili  0.9  /  DBili  x   /  AST  142<H>  /  ALT  90<H>  /  AlkPhos  59            CAPILLARY BLOOD GLUCOSE      POCT Blood Glucose.: 503 mg/dL (28 Aug 2023 06:52)      Urinalysis Basic - ( 27 Aug 2023 13:20 )    Color: Yellow / Appearance: Clear / S.009 / pH: x  Gluc: x / Ketone: Negative mg/dL  / Bili: Negative / Urobili: 0.2 mg/dL   Blood: x / Protein: Negative mg/dL / Nitrite: Negative   Leuk Esterase: Trace / RBC: 0 /HPF / WBC 2 /HPF   Sq Epi: x / Non Sq Epi: x / Bacteria: x      CULTURES:      Physical Examination:    General: No acute distress.  Alert, oriented, interactive, nonfocal    HEENT: Pupils equal, reactive to light.  Symmetric.    PULM: Clear to auscultation bilaterally, no significant sputum production    CVS: Regular rate and rhythm, no murmurs, rubs, or gallops    ABD: Soft, nondistended, nontender, normoactive bowel sounds, no masses    EXT: No edema, nontender    SKIN: Warm and well perfused, no rashes noted.    NEURO: A&Ox3, strength 5/5 all extremities, cranial nerves grossly intact, no focal deficits      RADIOLOGY: ***      CRITICAL CARE TIME SPENT: ***  Evaluating/treating patient, reviewing data/labs/imaging, discussing case with multidisciplinary team, discussing plan/goals of care with patient/family. Non-inclusive of procedure time.   Patient is a 53y old  Female who presents with a chief complaint of fall, pancytopenia (27 Aug 2023 19:43)      BRIEF HOSPITAL COURSE: Pt is a 54 y/o F pmhx of anemia, hypothyroidism, osteoporosis, depression, anxiety, chronic hyponatremia, w/ multiple admissions for malnutrition failure to thrive presents to [facility] ED w/ complaints of weakness and hypoglycemia. Pt had previously been seen in ED on  for a fall at PT and was found to have a L wrist fx at that time, splinted by ortho but pt signed out AMA after splint was applied. Pt "fell" with aid at her assisted living and EMS was called, found to be hypoglycemic to 48 and given D10. In ED pt glucose improved to 171 and attempted to refuse admission again stating it is because she didn't eat today.     Events last 24 hours: ***    PAST MEDICAL & SURGICAL HISTORY:  Anemia      Depression      Chronic musculoskeletal pain      Anorexia  at age 15 yrs      No significant past surgical history          Review of Systems:  CONSTITUTIONAL: No fever, chills, or fatigue  EYES: No eye pain, visual disturbances, or discharge  ENMT:  No difficulty hearing, tinnitus, vertigo; No sinus or throat pain  NECK: No pain or stiffness  RESPIRATORY: No cough, wheezing, chills or hemoptysis; No shortness of breath  CARDIOVASCULAR: No chest pain, palpitations, dizziness, or leg swelling  GASTROINTESTINAL: No abdominal or epigastric pain. No nausea, vomiting, or hematemesis; No diarrhea or constipation. No melena or hematochezia.  GENITOURINARY: No dysuria, frequency, hematuria, or incontinence  NEUROLOGICAL: No headaches, memory loss, loss of strength, numbness, or tremors  SKIN: No itching, burning, rashes, or lesions   MUSCULOSKELETAL: No joint pain or swelling; No muscle, back, or extremity pain  PSYCHIATRIC: No depression, anxiety, mood swings, or difficulty sleeping      Medications:    norepinephrine Infusion 0.05 MICROgram(s)/kG/Min IV Continuous <Continuous>      melatonin 3 milliGRAM(s) Oral at bedtime PRN  midazolam Injectable 4 milliGRAM(s) IV Push once            dextrose 50% Injectable 25 Gram(s) IV Push once  dextrose 50% Injectable 12.5 Gram(s) IV Push once  dextrose 50% Injectable 25 Gram(s) IV Push once  dextrose Oral Gel 15 Gram(s) Oral once  glucagon  Injectable 1 milliGRAM(s) IntraMuscular once  levothyroxine 25 MICROGram(s) Oral daily        chlorhexidine 2% Cloths 1 Application(s) Topical <User Schedule>            ICU Vital Signs Last 24 Hrs  T(C): 36.6 (28 Aug 2023 06:14), Max: 36.7 (27 Aug 2023 11:08)  T(F): 97.8 (28 Aug 2023 06:14), Max: 98.1 (27 Aug 2023 11:08)  HR: 51 (28 Aug 2023 06:14) (51 - 84)  BP: 106/78 (28 Aug 2023 06:14) (106/78 - 125/87)  BP(mean): --  ABP: --  ABP(mean): --  RR: 18 (28 Aug 2023 06:14) (18 - 20)  SpO2: 93% (28 Aug 2023 06:14) (93% - 100%)    O2 Parameters below as of 28 Aug 2023 06:14  Patient On (Oxygen Delivery Method): room air                I&O's Detail        LABS:                        6.9    x     )-----------( x        ( 27 Aug 2023 18:14 )             18.7     08-27    129<L>  |  98  |  55<H>  ----------------------------<  209<H>  2.7<LL>   |  25  |  0.53    Ca    7.2<L>      27 Aug 2023 12:20  Phos  3.1     08-27  Mg     2.0     08-27    TPro  6.1  /  Alb  3.5  /  TBili  0.9  /  DBili  x   /  AST  142<H>  /  ALT  90<H>  /  AlkPhos  59  08-27          CAPILLARY BLOOD GLUCOSE      POCT Blood Glucose.: 503 mg/dL (28 Aug 2023 06:52)      Urinalysis Basic - ( 27 Aug 2023 13:20 )    Color: Yellow / Appearance: Clear / S.009 / pH: x  Gluc: x / Ketone: Negative mg/dL  / Bili: Negative / Urobili: 0.2 mg/dL   Blood: x / Protein: Negative mg/dL / Nitrite: Negative   Leuk Esterase: Trace / RBC: 0 /HPF / WBC 2 /HPF   Sq Epi: x / Non Sq Epi: x / Bacteria: x      CULTURES:      Physical Examination:    General: No acute distress.  Alert, oriented, interactive, nonfocal    HEENT: Pupils equal, reactive to light.  Symmetric.    PULM: Clear to auscultation bilaterally, no significant sputum production    CVS: Regular rate and rhythm, no murmurs, rubs, or gallops    ABD: Soft, nondistended, nontender, normoactive bowel sounds, no masses    EXT: No edema, nontender    SKIN: Warm and well perfused, no rashes noted.    NEURO: A&Ox3, strength 5/5 all extremities, cranial nerves grossly intact, no focal deficits      RADIOLOGY: ***      CRITICAL CARE TIME SPENT: ***  Evaluating/treating patient, reviewing data/labs/imaging, discussing case with multidisciplinary team, discussing plan/goals of care with patient/family. Non-inclusive of procedure time.   Patient is a 53y old  Female who presents with a chief complaint of fall, pancytopenia (27 Aug 2023 19:43)      BRIEF HOSPITAL COURSE: Pt is a 54 y/o F pmhx of anemia, hypothyroidism, osteoporosis, depression, anxiety, chronic hyponatremia, w/ multiple admissions for malnutrition failure to thrive presents to [facility] w/ complaints of weakness and hypoglycemia. Pt had previously been seen in ED on  for a fall at PT and was found to have a L wrist fx at that time, splinted by ortho but pt signed out AMA after splint was applied. Pt "fell" with aid at her assisted living and EMS was called, found to be hypoglycemic to 48 and given D10. In ED pt glucose improved to 171 and attempted to refuse admission again stating it is because she didn't eat today. Admitted to medicine for pancytopenia, weakness and fall.     Events last 24 hours: RRT called this AM for unresponsiveness hypoxic episode     PAST MEDICAL & SURGICAL HISTORY:  Anemia      Depression      Chronic musculoskeletal pain      Anorexia  at age 15 yrs      No significant past surgical history          Review of Systems:  CONSTITUTIONAL: No fever, chills, or fatigue  EYES: No eye pain, visual disturbances, or discharge  ENMT:  No difficulty hearing, tinnitus, vertigo; No sinus or throat pain  NECK: No pain or stiffness  RESPIRATORY: No cough, wheezing, chills or hemoptysis; No shortness of breath  CARDIOVASCULAR: No chest pain, palpitations, dizziness, or leg swelling  GASTROINTESTINAL: No abdominal or epigastric pain. No nausea, vomiting, or hematemesis; No diarrhea or constipation. No melena or hematochezia.  GENITOURINARY: No dysuria, frequency, hematuria, or incontinence  NEUROLOGICAL: No headaches, memory loss, loss of strength, numbness, or tremors  SKIN: No itching, burning, rashes, or lesions   MUSCULOSKELETAL: No joint pain or swelling; No muscle, back, or extremity pain  PSYCHIATRIC: No depression, anxiety, mood swings, or difficulty sleeping      Medications:    norepinephrine Infusion 0.05 MICROgram(s)/kG/Min IV Continuous <Continuous>      melatonin 3 milliGRAM(s) Oral at bedtime PRN  midazolam Injectable 4 milliGRAM(s) IV Push once            dextrose 50% Injectable 25 Gram(s) IV Push once  dextrose 50% Injectable 12.5 Gram(s) IV Push once  dextrose 50% Injectable 25 Gram(s) IV Push once  dextrose Oral Gel 15 Gram(s) Oral once  glucagon  Injectable 1 milliGRAM(s) IntraMuscular once  levothyroxine 25 MICROGram(s) Oral daily        chlorhexidine 2% Cloths 1 Application(s) Topical <User Schedule>            ICU Vital Signs Last 24 Hrs  T(C): 36.6 (28 Aug 2023 06:14), Max: 36.7 (27 Aug 2023 11:08)  T(F): 97.8 (28 Aug 2023 06:14), Max: 98.1 (27 Aug 2023 11:08)  HR: 51 (28 Aug 2023 06:14) (51 - 84)  BP: 106/78 (28 Aug 2023 06:14) (106/78 - 125/87)  BP(mean): --  ABP: --  ABP(mean): --  RR: 18 (28 Aug 2023 06:14) (18 - 20)  SpO2: 93% (28 Aug 2023 06:14) (93% - 100%)    O2 Parameters below as of 28 Aug 2023 06:14  Patient On (Oxygen Delivery Method): room air                I&O's Detail        LABS:                        6.9    x     )-----------( x        ( 27 Aug 2023 18:14 )             18.7     08-27    129<L>  |  98  |  55<H>  ----------------------------<  209<H>  2.7<LL>   |  25  |  0.53    Ca    7.2<L>      27 Aug 2023 12:20  Phos  3.1       Mg     2.0         TPro  6.1  /  Alb  3.5  /  TBili  0.9  /  DBili  x   /  AST  142<H>  /  ALT  90<H>  /  AlkPhos  59  08-27          CAPILLARY BLOOD GLUCOSE      POCT Blood Glucose.: 503 mg/dL (28 Aug 2023 06:52)      Urinalysis Basic - ( 27 Aug 2023 13:20 )    Color: Yellow / Appearance: Clear / S.009 / pH: x  Gluc: x / Ketone: Negative mg/dL  / Bili: Negative / Urobili: 0.2 mg/dL   Blood: x / Protein: Negative mg/dL / Nitrite: Negative   Leuk Esterase: Trace / RBC: 0 /HPF / WBC 2 /HPF   Sq Epi: x / Non Sq Epi: x / Bacteria: x      CULTURES:      Physical Examination:    General: No acute distress.  Alert, oriented, interactive, nonfocal    HEENT: Pupils equal, reactive to light.  Symmetric.    PULM: Clear to auscultation bilaterally, no significant sputum production    CVS: Regular rate and rhythm, no murmurs, rubs, or gallops    ABD: Soft, nondistended, nontender, normoactive bowel sounds, no masses    EXT: No edema, nontender    SKIN: Warm and well perfused, no rashes noted.    NEURO: A&Ox3, strength 5/5 all extremities, cranial nerves grossly intact, no focal deficits      RADIOLOGY: ***      CRITICAL CARE TIME SPENT: ***  Evaluating/treating patient, reviewing data/labs/imaging, discussing case with multidisciplinary team, discussing plan/goals of care with patient/family. Non-inclusive of procedure time.   Patient is a 53y old  Female who presents with a chief complaint of fall, pancytopenia (27 Aug 2023 19:43)      BRIEF HOSPITAL COURSE: Pt is a 52 y/o F pmhx of anemia, hypothyroidism, osteoporosis, depression, anxiety, chronic hyponatremia, w/ multiple admissions for malnutrition failure to thrive presents to [facility] ED w/ complaints of weakness and hypoglycemia. Pt had previously been seen in ED on  for a fall at PT and was found to have a L wrist fx at that time, splinted by ortho but pt signed out AMA after splint was applied. Pt "fell" with aid at her assisted living and EMS was called, found to be hypoglycemic to 48 and given D10. In ED pt glucose improved to 171 and attempted to refuse admission again stating it is because she didn't eat today. Admitted to medicine for pancytopenia, weakness and fall.     Events last 24 hours: RRT called this AM for unresponsiveness and hypoglycemic episode w/ shallow respirations. Pt found to have glucose of 19 on finger stick, given 1 amp of D5. HR decreased to 30's 40's, BP dropped to 70/40, repeat BG of 16. Given atropine X1 and 2pushes of D50 w/ improvement in BP and HR to 92/54 and 86. Pt unresponsive to painful stimuli, intubated for airway protection and transferred to ICU for further eval and management.     PAST MEDICAL & SURGICAL HISTORY:  Anemia      Depression      Chronic musculoskeletal pain      Anorexia  at age 15 yrs      No significant past surgical history          Review of Systems:  CONSTITUTIONAL: No fever, chills, or fatigue  EYES: No eye pain, visual disturbances, or discharge  ENMT:  No difficulty hearing, tinnitus, vertigo; No sinus or throat pain  NECK: No pain or stiffness  RESPIRATORY: No cough, wheezing, chills or hemoptysis; No shortness of breath  CARDIOVASCULAR: No chest pain, palpitations, dizziness, or leg swelling  GASTROINTESTINAL: No abdominal or epigastric pain. No nausea, vomiting, or hematemesis; No diarrhea or constipation. No melena or hematochezia.  GENITOURINARY: No dysuria, frequency, hematuria, or incontinence  NEUROLOGICAL: No headaches, memory loss, loss of strength, numbness, or tremors  SKIN: No itching, burning, rashes, or lesions   MUSCULOSKELETAL: No joint pain or swelling; No muscle, back, or extremity pain  PSYCHIATRIC: No depression, anxiety, mood swings, or difficulty sleeping      Medications:    norepinephrine Infusion 0.05 MICROgram(s)/kG/Min IV Continuous <Continuous>      melatonin 3 milliGRAM(s) Oral at bedtime PRN  midazolam Injectable 4 milliGRAM(s) IV Push once            dextrose 50% Injectable 25 Gram(s) IV Push once  dextrose 50% Injectable 12.5 Gram(s) IV Push once  dextrose 50% Injectable 25 Gram(s) IV Push once  dextrose Oral Gel 15 Gram(s) Oral once  glucagon  Injectable 1 milliGRAM(s) IntraMuscular once  levothyroxine 25 MICROGram(s) Oral daily        chlorhexidine 2% Cloths 1 Application(s) Topical <User Schedule>            ICU Vital Signs Last 24 Hrs  T(C): 36.6 (28 Aug 2023 06:14), Max: 36.7 (27 Aug 2023 11:08)  T(F): 97.8 (28 Aug 2023 06:14), Max: 98.1 (27 Aug 2023 11:08)  HR: 51 (28 Aug 2023 06:14) (51 - 84)  BP: 106/78 (28 Aug 2023 06:14) (106/78 - 125/87)  BP(mean): --  ABP: --  ABP(mean): --  RR: 18 (28 Aug 2023 06:14) (18 - 20)  SpO2: 93% (28 Aug 2023 06:14) (93% - 100%)    O2 Parameters below as of 28 Aug 2023 06:14  Patient On (Oxygen Delivery Method): room air                I&O's Detail        LABS:                        6.9    x     )-----------( x        ( 27 Aug 2023 18:14 )             18.7     27    129<L>  |  98  |  55<H>  ----------------------------<  209<H>  2.7<LL>   |  25  |  0.53    Ca    7.2<L>      27 Aug 2023 12:20  Phos  3.1       Mg     2.0         TPro  6.1  /  Alb  3.5  /  TBili  0.9  /  DBili  x   /  AST  142<H>  /  ALT  90<H>  /  AlkPhos  59            CAPILLARY BLOOD GLUCOSE      POCT Blood Glucose.: 503 mg/dL (28 Aug 2023 06:52)      Urinalysis Basic - ( 27 Aug 2023 13:20 )    Color: Yellow / Appearance: Clear / S.009 / pH: x  Gluc: x / Ketone: Negative mg/dL  / Bili: Negative / Urobili: 0.2 mg/dL   Blood: x / Protein: Negative mg/dL / Nitrite: Negative   Leuk Esterase: Trace / RBC: 0 /HPF / WBC 2 /HPF   Sq Epi: x / Non Sq Epi: x / Bacteria: x      CULTURES:      Physical Examination:    General: No acute distress.  Alert, oriented, interactive, nonfocal    HEENT: Pupils equal, reactive to light.  Symmetric.    PULM: Clear to auscultation bilaterally, no significant sputum production    CVS: Regular rate and rhythm, no murmurs, rubs, or gallops    ABD: Soft, nondistended, nontender, normoactive bowel sounds, no masses    EXT: No edema, nontender    SKIN: Warm and well perfused, no rashes noted.    NEURO: A&Ox3, strength 5/5 all extremities, cranial nerves grossly intact, no focal deficits      RADIOLOGY: ***      CRITICAL CARE TIME SPENT: ***  Evaluating/treating patient, reviewing data/labs/imaging, discussing case with multidisciplinary team, discussing plan/goals of care with patient/family. Non-inclusive of procedure time.

## 2023-08-28 NOTE — CONSULT NOTE ADULT - SUBJECTIVE AND OBJECTIVE BOX
INCOMPLETE NOTE.  Documentation in Progress  PT SEEN AND EVALUATED.   FULL/ADDITIONAL RECOMMENDATIONS TO FOLLOW   ***************************************************************    Patient is a 53y old  Female who presents with a chief complaint of fall, pancytopenia (28 Aug 2023 13:17)    HPI:  52yo F with PMH anemia, hypothyroidism, osteoporosis, chronic hyponatremia, depression, anxiety presents to ED on 23 due to weakness and hypoglycemia.  Patient was seen at this emergency room  ago for a fall at physical therapy .  Was diagnosed with left wrist fracture at that time.  Splint was applied and seen by orthopedics.  Recommended admission due to fall risk and unsafe discharge.  Patient refused admission and left AMA.  Patient was fully aware of  fall risk and all lab abnormalities.  States she wanted to follow-up outpatient.  Today patient became weak and aide assisted her down to the floor.  Denies hitting head or any injuries from the fall because aide assisted her.  EMS was called.  Sugar on arrival per EMS was 48 and given D10.  Sugar on arrival to emergency room was 171.  Patient denies any pain or complaints.  Denies any injuries from the fall.  At this time patient is refusing any treatment or test.  States she just wants to go home and eat.  States her sugar was low because she did not eat today but she says she will eat when she gets home. Patient removed splint that was placed 2 days ago and was not wearing splint today per EMS. Patient's sister reports that she has been getting worse for the last 6 months and has become increasingly more frail. Patient lives alone with aides as per sister. Patient has ritualistic eating habits and a diet which includes eggs, fish, and vegetables. She currently takes no medications.     Denies fever, chills, chest pain, palpitations, SOB, cough, abdominal pain, nausea, vomiting, diarrhea, constipation, urinary frequency, urgency, or dysuria, headaches, changes in vision, dizziness, numbness, tingling.  Denies recent travel, recent antibiotic use, or sick contacts.    ED Course:   Vitals: BP: , HR: , Temp: , RR: , SpO2: % on   Labs:  WBC 2.5, Hbg 7.0, plt 123, Na 129, K 2.7, BUN 55, Glu 209, Ca 7.2, ast 142, ALT 90, TSH 11.4  Urine/Blood tox: negative  UA: trace LE, squamous epithelial present, 100 glucose  CXR: unremarkable as per personal read, ofiical read states simialr to recent CXR on   Xray wrist:  There is a comminuted large horizontal fracture of the distal radius possibly with extension to the inner endplate. There is mild dorsal indentation distal fracture.  Received 40mEq KCl PO x1, 10mEq KCl x3 in the ED    (27 Aug 2023 17:14)      PAST MEDICAL & SURGICAL HISTORY:  Anemia  Depression  Chronic musculoskeletal pain  Anorexia at age 15 yrs  No significant past surgical history       HEALTH ISSUES - PROBLEM Dx:  Malnutrition, unspecified type  Pancytopenia  Left wrist fracture  Hypothyroidism  Electrolyte imbalance  Preventative health care  History of behavioral and mental health problems  Transaminitis  Weakness    Failure to thrive in adult [R62.7]  Anemia [285.9]  Depression [311]  Chronic musculoskeletal pain [729.1]  Anorexia [783.0]  No significant past surgical history [112418941]  Anemia [D64.9]  Hypokalemia [E87.6]  Hyponatremia [E87.1]      FAMILY HISTORY:  No pertinent family history in first degree relatives  Two sisters  1 lives in [place]  1 lives in [place]      [SOCIAL HISTORY: ]     smoking: non-smoker     EtOH:  none     illicit drugs: none      occupation:  disabled, prior      marital status:  single, no children.      Other:       [ALLERGIES/INTOLERANCES:]  Allergies      No Known Allergies  Intolerances      [MEDICATIONS]  MEDICATIONS  (STANDING):  chlorhexidine 0.12% Liquid 15 milliLiter(s) Oral Mucosa every 12 hours  chlorhexidine 2% Cloths 1 Application(s) Topical <User Schedule>  dextrose 50% Injectable 25 Gram(s) IV Push once  dextrose 50% Injectable 12.5 Gram(s) IV Push once  dextrose 50% Injectable 25 Gram(s) IV Push once  dextrose Oral Gel 15 Gram(s) Oral once  famotidine Injectable 20 milliGRAM(s) IV Push daily  glucagon  Injectable 1 milliGRAM(s) IntraMuscular once  heparin   Injectable 5000 Unit(s) SubCutaneous every 12 hours  lactated ringers. 1000 milliLiter(s) (70 mL/Hr) IV Continuous <Continuous>  levothyroxine Injectable 18.5 MICROGram(s) IV Push at bedtime  propofol Infusion 10 MICROgram(s)/kG/Min (1.6 mL/Hr) IV Continuous <Continuous>  thiamine IVPB 500 milliGRAM(s) IV Intermittent three times a day      MEDICATIONS  (PRN):      [REVIEW OF SYSTEMS: ]  unable to obtain  per sister  fall 2-3wks ago  poor PO intake  +Anorexia.       [VITALS SIGNS 24hrs]  Vital Signs Last 24 Hrs  T(C): 36.2 (28 Aug 2023 12:00), Max: 36.6 (27 Aug 2023 20:53)  T(F): 97.2 (28 Aug 2023 12:00), Max: 97.8 (27 Aug 2023 20:53)  HR: 86 (28 Aug 2023 13:00) (51 - 107)  BP: 107/86 (28 Aug 2023 13:) (91/72 - 117/80)  BP(mean): 93 (28 Aug 2023 13:00) (75 - 93)  RR: 45 (28 Aug 2023 13:) (18 - 46)  SpO2: 100% (28 Aug 2023 13:00) (68% - 100%)    Parameters below as of 28 Aug 2023 13:00  Patient On (Oxygen Delivery Method): ventilator    O2 Concentration (%): 30  Daily Height in cm: 144.78 (28 Aug 2023 07:30)    Daily Weight in k.7 (28 Aug 2023 07:30)  Mode: AC/ CMV (Assist Control/ Continuous Mandatory Ventilation)  RR (machine): 16  TV (machine): 300  FiO2: 30  PEEP: 5  ITime: 1  MAP: 8  PIP: 25    I&O's Summary  27 Aug 2023 07:01  -  28 Aug 2023 07:00  --------------------------------------------------------  IN: 2.5 mL / OUT: 0 mL / NET: 2.5 mL    28 Aug 2023 07:  -  28 Aug 2023 14:21  --------------------------------------------------------  IN: 796.9 mL / OUT: 1900 mL / NET: -1103.1 mL      [PHYSICAL EXAM]  GEN:   HEENT: normocephalic and atraumatic. EOMI. PERRL.    NECK: Supple.  No lymphadenopathy   LUNGS: Clear to auscultation.  HEART: S1S2 Regular rate and rhythm, no MRG  ABDOMEN: Soft, nontender, and nondistended.  Positive bowel sounds.    : No CVA tenderness  EXTREMITIES: Without edema.  NEUROLOGIC: grossly intact.  PSYCHIATRIC: Appropriate affect .  SKIN: No rash       [LABS: ]                        9.1    2.06  )-----------( 112      ( 28 Aug 2023 07:31 )             25.8     CBC Full  -  ( 28 Aug 2023 07:31 )  WBC Count : 2.06 K/uL  RBC Count : 2.66 M/uL  Hemoglobin : 9.1 g/dL  Hematocrit : 25.8 %  Platelet Count - Automated : 112 K/uL  Mean Cell Volume : 97.0 fl  Mean Cell Hemoglobin : 34.2 pg  Mean Cell Hemoglobin Concentration : 35.3 gm/dL  Auto Neutrophil # : 1.61 K/uL  Auto Lymphocyte # : 0.26 K/uL  Auto Monocyte # : 0.03 K/uL  Auto Eosinophil # : 0.00 K/uL  Auto Basophil # : 0.00 K/uL  Auto Neutrophil % : 78.1 %  Auto Lymphocyte % : 12.6 %  Auto Monocyte % : 1.5 %  Auto Eosinophil % : 0.0 %  Auto Basophil % : 0.0 %      134<L>  |  105  |  44<H>  ----------------------------<  770<HH>  3.3<L>   |  19<L>  |  0.82    Ca    6.2<LL>      28 Aug 2023 09:35  Phos  3.5       Mg     2.1         TPro  4.3<L>  /  Alb  2.6<L>  /  TBili  1.4<H>  /  DBili  x   /  AST  753<H>  /  ALT  403<H>  /  AlkPhos  68    PT/INR - ( 28 Aug 2023 09:00 )   PT: 17.4 sec;   INR: 1.50 ratio    PTT - ( 28 Aug 2023 09:00 )  PTT:29.3 sec    LIVER FUNCTIONS - ( 28 Aug 2023 07:31 )  Alb: 2.6 g/dL / Pro: 4.3 g/dL / ALK PHOS: 68 U/L / ALT: 403 U/L / AST: 753 U/L / GGT: x           CARDIAC MARKERS ( 28 Aug 2023 07:31 )  x     / x     / 320 U/L / x     / 15.4 ng/mL    Urinalysis Basic - ( 28 Aug 2023 09:35 )  Color: x / Appearance: x / SG: x / pH: x  Gluc: 770 mg/dL / Ketone: x  / Bili: x / Urobili: x   Blood: x / Protein: x / Nitrite: x   Leuk Esterase: x / RBC: x / WBC x   Sq Epi: x / Non Sq Epi: x / Bacteria: x    ABG - ( 28 Aug 2023 08:52 )  pH, Arterial: 7.32  pH, Blood: x     /  pCO2: 31    /  pO2: 317   / HCO3: 16    / Base Excess: -10.1 /  SaO2: x         CBC TREND (5 Days)  WBC Count: 2.06 K/uL ( @ 07:31)  WBC Count: 2.50 K/uL ( @ 12:20)    Hemoglobin: 9.1 g/dL ( @ 07:31)  Hemoglobin: 6.9 g/dL ( @ 18:14)  Hemoglobin: 7.0 g/dL ( @ 12:20)    Hematocrit: 25.8 % ( @ 07:31)  Hematocrit: 18.7 % ( @ 18:14)  Hematocrit: 19.4 % ( @ 12:20)    Platelet Count - Automated: 112 K/uL ( @ 07:31)  Platelet Count - Automated: 123 K/uL ( @ 12:20)      Ferritin: 430 ng/mL ( @ 12:20)  Iron Total: 97 ug/dL ( @ 12:20)  Vitamin B12, Serum: 1988 pg/mL ( @ 12:20)  Folate, Serum: >20.0 ng/mL ( @ 12:20)       [MICROBIOLOGY /  VIROLOGY:]      [PATHOLOGY]       [RADIOLOGY & ADDITIONAL STUDIES:]        Patient is a 53y old  Female who presents with a chief complaint of fall, pancytopenia (28 Aug 2023 13:17)    HPI:  54yo F with PMH anemia, hypothyroidism, osteoporosis, chronic hyponatremia, depression, anxiety presents to ED on 23 due to weakness and hypoglycemia.  Patient was seen at this emergency room  ago for a fall at physical therapy .  Was diagnosed with left wrist fracture at that time.  Splint was applied and seen by orthopedics.  Recommended admission due to fall risk and unsafe discharge.  Patient refused admission and left AMA.  Patient was fully aware of  fall risk and all lab abnormalities.  States she wanted to follow-up outpatient.  Today patient became weak and aide assisted her down to the floor.  Denies hitting head or any injuries from the fall because aide assisted her.  EMS was called.  Sugar on arrival per EMS was 48 and given D10.  Sugar on arrival to emergency room was 171.  Patient denies any pain or complaints.  Denies any injuries from the fall.  At this time patient is refusing any treatment or test.  States she just wants to go home and eat.  States her sugar was low because she did not eat today but she says she will eat when she gets home. Patient removed splint that was placed 2 days ago and was not wearing splint today per EMS. Patient's sister reports that she has been getting worse for the last 6 months and has become increasingly more frail. Patient lives alone with aides as per sister. Patient has ritualistic eating habits and a diet which includes eggs, fish, and vegetables. She currently takes no medications.     Denies fever, chills, chest pain, palpitations, SOB, cough, abdominal pain, nausea, vomiting, diarrhea, constipation, urinary frequency, urgency, or dysuria, headaches, changes in vision, dizziness, numbness, tingling.  Denies recent travel, recent antibiotic use, or sick contacts.    ED Course:   Vitals: BP: , HR: , Temp: , RR: , SpO2: % on   Labs:  WBC 2.5, Hbg 7.0, plt 123, Na 129, K 2.7, BUN 55, Glu 209, Ca 7.2, ast 142, ALT 90, TSH 11.4  Urine/Blood tox: negative  UA: trace LE, squamous epithelial present, 100 glucose  CXR: unremarkable as per personal read, ofiical read states simialr to recent CXR on   Xray wrist:  There is a comminuted large horizontal fracture of the distal radius possibly with extension to the inner endplate. There is mild dorsal indentation distal fracture.  Received 40mEq KCl PO x1, 10mEq KCl x3 in the ED    (27 Aug 2023 17:14)      PAST MEDICAL & SURGICAL HISTORY:  Anemia  Depression  Chronic musculoskeletal pain  Anorexia at age 15 yrs  No significant past surgical history       HEALTH ISSUES - PROBLEM Dx:  Malnutrition, unspecified type  Pancytopenia  Left wrist fracture  Hypothyroidism  Electrolyte imbalance  Preventative health care  History of behavioral and mental health problems  Transaminitis  Weakness    Failure to thrive in adult [R62.7]  Anemia [285.9]  Depression [311]  Chronic musculoskeletal pain [729.1]  Anorexia [783.0]  No significant past surgical history [[id number]]  Anemia [D64.9]  Hypokalemia [E87.6]  Hyponatremia [E87.1]      FAMILY HISTORY:  No pertinent family history in first degree relatives  Two sisters  1 lives in [place]  1 lives in [place]      [SOCIAL HISTORY: ]     smoking: non-smoker     EtOH:  none     illicit drugs: none      occupation:  disabled, prior      marital status:  single, no children.      Other:       [ALLERGIES/INTOLERANCES:]  Allergies      No Known Allergies  Intolerances      [MEDICATIONS]  MEDICATIONS  (STANDING):  chlorhexidine 0.12% Liquid 15 milliLiter(s) Oral Mucosa every 12 hours  chlorhexidine 2% Cloths 1 Application(s) Topical <User Schedule>  dextrose 50% Injectable 25 Gram(s) IV Push once  dextrose 50% Injectable 12.5 Gram(s) IV Push once  dextrose 50% Injectable 25 Gram(s) IV Push once  dextrose Oral Gel 15 Gram(s) Oral once  famotidine Injectable 20 milliGRAM(s) IV Push daily  glucagon  Injectable 1 milliGRAM(s) IntraMuscular once  heparin   Injectable 5000 Unit(s) SubCutaneous every 12 hours  lactated ringers. 1000 milliLiter(s) (70 mL/Hr) IV Continuous <Continuous>  levothyroxine Injectable 18.5 MICROGram(s) IV Push at bedtime  propofol Infusion 10 MICROgram(s)/kG/Min (1.6 mL/Hr) IV Continuous <Continuous>  thiamine IVPB 500 milliGRAM(s) IV Intermittent three times a day      MEDICATIONS  (PRN):      [REVIEW OF SYSTEMS: ]  unable to obtain  per sister  fall 2-3wks ago  poor PO intake  +Anorexia.       [VITALS SIGNS 24hrs]  Vital Signs Last 24 Hrs  T(C): 36.2 (28 Aug 2023 12:00), Max: 36.6 (27 Aug 2023 20:53)  T(F): 97.2 (28 Aug 2023 12:00), Max: 97.8 (27 Aug 2023 20:53)  HR: 86 (28 Aug 2023 13:00) (51 - 107)  BP: 107/86 (28 Aug 2023 13:00) (91/72 - 117/80)  BP(mean): 93 (28 Aug 2023 13:00) (75 - 93)  RR: 45 (28 Aug 2023 13:) (18 - 46)  SpO2: 100% (28 Aug 2023 13:) (68% - 100%)    Parameters below as of 28 Aug 2023 13:00  Patient On (Oxygen Delivery Method): ventilator    O2 Concentration (%): 30  Daily Height in cm: 144.78 (28 Aug 2023 07:30)    Daily Weight in k.7 (28 Aug 2023 07:30)  Mode: AC/ CMV (Assist Control/ Continuous Mandatory Ventilation)  RR (machine): 16  TV (machine): 300  FiO2: 30  PEEP: 5  ITime: 1  MAP: 8  PIP: 25    I&O's Summary  27 Aug 2023 07:  -  28 Aug 2023 07:00  --------------------------------------------------------  IN: 2.5 mL / OUT: 0 mL / NET: 2.5 mL    28 Aug 2023 07:01  -  28 Aug 2023 14:21  --------------------------------------------------------  IN: 796.9 mL / OUT: 1900 mL / NET: -1103.1 mL      [PHYSICAL EXAM]  GEN:   HEENT: normocephalic and atraumatic. EOMI. PERRL.    NECK: Supple.  No lymphadenopathy   LUNGS: Clear to auscultation.  HEART: S1S2 Regular rate and rhythm, no MRG  ABDOMEN: Soft, nontender, and nondistended.  Positive bowel sounds.    : No CVA tenderness  EXTREMITIES: Without edema.  NEUROLOGIC: grossly intact.  PSYCHIATRIC: Appropriate affect .  SKIN: No rash       [LABS: ]                        9.1    2.06  )-----------( 112      ( 28 Aug 2023 07:31 )             25.8     CBC Full  -  ( 28 Aug 2023 07:31 )  WBC Count : 2.06 K/uL  RBC Count : 2.66 M/uL  Hemoglobin : 9.1 g/dL  Hematocrit : 25.8 %  Platelet Count - Automated : 112 K/uL  Mean Cell Volume : 97.0 fl  Mean Cell Hemoglobin : 34.2 pg  Mean Cell Hemoglobin Concentration : 35.3 gm/dL  Auto Neutrophil # : 1.61 K/uL  Auto Lymphocyte # : 0.26 K/uL  Auto Monocyte # : 0.03 K/uL  Auto Eosinophil # : 0.00 K/uL  Auto Basophil # : 0.00 K/uL  Auto Neutrophil % : 78.1 %  Auto Lymphocyte % : 12.6 %  Auto Monocyte % : 1.5 %  Auto Eosinophil % : 0.0 %  Auto Basophil % : 0.0 %      134<L>  |  105  |  44<H>  ----------------------------<  770<HH>  3.3<L>   |  19<L>  |  0.82    Ca    6.2<LL>      28 Aug 2023 09:35  Phos  3.5       Mg     2.1         TPro  4.3<L>  /  Alb  2.6<L>  /  TBili  1.4<H>  /  DBili  x   /  AST  753<H>  /  ALT  403<H>  /  AlkPhos  68  08-28  PT/INR - ( 28 Aug 2023 09:00 )   PT: 17.4 sec;   INR: 1.50 ratio    PTT - ( 28 Aug 2023 09:00 )  PTT:29.3 sec    LIVER FUNCTIONS - ( 28 Aug 2023 07:31 )  Alb: 2.6 g/dL / Pro: 4.3 g/dL / ALK PHOS: 68 U/L / ALT: 403 U/L / AST: 753 U/L / GGT: x           CARDIAC MARKERS ( 28 Aug 2023 07:31 )  x     / x     / 320 U/L / x     / 15.4 ng/mL    Urinalysis Basic - ( 28 Aug 2023 09:35 )  Color: x / Appearance: x / SG: x / pH: x  Gluc: 770 mg/dL / Ketone: x  / Bili: x / Urobili: x   Blood: x / Protein: x / Nitrite: x   Leuk Esterase: x / RBC: x / WBC x   Sq Epi: x / Non Sq Epi: x / Bacteria: x    ABG - ( 28 Aug 2023 08:52 )  pH, Arterial: 7.32  pH, Blood: x     /  pCO2: 31    /  pO2: 317   / HCO3: 16    / Base Excess: -10.1 /  SaO2: x         CBC TREND (5 Days)  WBC Count: 2.06 K/uL ( @ 07:31)  WBC Count: 2.50 K/uL ( @ 12:20)    Hemoglobin: 9.1 g/dL ( @ 07:31)  Hemoglobin: 6.9 g/dL ( @ 18:14)  Hemoglobin: 7.0 g/dL ( @ 12:20)    Hematocrit: 25.8 % ( @ 07:31)  Hematocrit: 18.7 % ( @ 18:14)  Hematocrit: 19.4 % ( @ 12:20)    Platelet Count - Automated: 112 K/uL ( @ 07:31)  Platelet Count - Automated: 123 K/uL ( @ 12:20)      Ferritin: 430 ng/mL ( @ 12:20)  Iron Total: 97 ug/dL ( @ 12:20)  Vitamin B12, Serum: 1988 pg/mL ( @ 12:20)  Folate, Serum: >20.0 ng/mL ( @ 12:20)       [MICROBIOLOGY /  VIROLOGY:]      [PATHOLOGY]       [RADIOLOGY & ADDITIONAL STUDIES:]        Patient is a 53y old  Female who presents with a chief complaint of fall, pancytopenia (28 Aug 2023 13:17)    HPI:  52yo F with PMH anemia, hypothyroidism, osteoporosis, chronic hyponatremia, depression, anxiety presents to ED on 23 due to weakness and hypoglycemia.  Patient was seen at this emergency room  ago for a fall at physical therapy .  Was diagnosed with left wrist fracture at that time.  Splint was applied and seen by orthopedics.  Recommended admission due to fall risk and unsafe discharge.  Patient refused admission and left AMA.  Patient was fully aware of  fall risk and all lab abnormalities.  States she wanted to follow-up outpatient.  Today patient became weak and aide assisted her down to the floor.  Denies hitting head or any injuries from the fall because aide assisted her.  EMS was called.  Sugar on arrival per EMS was 48 and given D10.  Sugar on arrival to emergency room was 171.  Patient denies any pain or complaints.  Denies any injuries from the fall.  At this time patient is refusing any treatment or test.  States she just wants to go home and eat.  States her sugar was low because she did not eat today but she says she will eat when she gets home. Patient removed splint that was placed 2 days ago and was not wearing splint today per EMS. Patient's sister reports that she has been getting worse for the last 6 months and has become increasingly more frail. Patient lives alone with aides as per sister. Patient has ritualistic eating habits and a diet which includes eggs, fish, and vegetables. She currently takes no medications.     Denies fever, chills, chest pain, palpitations, SOB, cough, abdominal pain, nausea, vomiting, diarrhea, constipation, urinary frequency, urgency, or dysuria, headaches, changes in vision, dizziness, numbness, tingling.  Denies recent travel, recent antibiotic use, or sick contacts.    ED Course:   Vitals: BP: , HR: , Temp: , RR: , SpO2: % on   Labs:  WBC 2.5, Hbg 7.0, plt 123, Na 129, K 2.7, BUN 55, Glu 209, Ca 7.2, ast 142, ALT 90, TSH 11.4  Urine/Blood tox: negative  UA: trace LE, squamous epithelial present, 100 glucose  CXR: unremarkable as per personal read, ofiical read states simialr to recent CXR on   Xray wrist:  There is a comminuted large horizontal fracture of the distal radius possibly with extension to the inner endplate. There is mild dorsal indentation distal fracture.  Received 40mEq KCl PO x1, 10mEq KCl x3 in the ED    (27 Aug 2023 17:14)      PAST MEDICAL & SURGICAL HISTORY:  Anemia  Depression  Chronic musculoskeletal pain  Anorexia at age 15 yrs  No significant past surgical history       HEALTH ISSUES - PROBLEM Dx:  Malnutrition, unspecified type  Pancytopenia  Left wrist fracture  Hypothyroidism  Electrolyte imbalance  Preventative health care  History of behavioral and mental health problems  Transaminitis  Weakness    Failure to thrive in adult [R62.7]  Anemia [285.9]  Depression [311]  Chronic musculoskeletal pain [729.1]  Anorexia [783.0]  No significant past surgical history [[id number]]  Anemia [D64.9]  Hypokalemia [E87.6]  Hyponatremia [E87.1]      FAMILY HISTORY:  No pertinent family history in first degree relatives  Two sisters  1 lives in [place]  1 lives in [place]      [SOCIAL HISTORY: ]     smoking: non-smoker     EtOH:  none     illicit drugs: none      occupation:  disabled, prior      marital status:  single, no children.      Other:       [ALLERGIES/INTOLERANCES:]  Allergies      No Known Allergies  Intolerances      [MEDICATIONS]  MEDICATIONS  (STANDING):  chlorhexidine 0.12% Liquid 15 milliLiter(s) Oral Mucosa every 12 hours  chlorhexidine 2% Cloths 1 Application(s) Topical <User Schedule>  dextrose 50% Injectable 25 Gram(s) IV Push once  dextrose 50% Injectable 12.5 Gram(s) IV Push once  dextrose 50% Injectable 25 Gram(s) IV Push once  dextrose Oral Gel 15 Gram(s) Oral once  famotidine Injectable 20 milliGRAM(s) IV Push daily  glucagon  Injectable 1 milliGRAM(s) IntraMuscular once  heparin   Injectable 5000 Unit(s) SubCutaneous every 12 hours  lactated ringers. 1000 milliLiter(s) (70 mL/Hr) IV Continuous <Continuous>  levothyroxine Injectable 18.5 MICROGram(s) IV Push at bedtime  propofol Infusion 10 MICROgram(s)/kG/Min (1.6 mL/Hr) IV Continuous <Continuous>  thiamine IVPB 500 milliGRAM(s) IV Intermittent three times a day      MEDICATIONS  (PRN):      [REVIEW OF SYSTEMS: ]  unable to obtain  per sister  fall 2-3wks ago  poor PO intake  +Anorexia.       [VITALS SIGNS 24hrs]  Vital Signs Last 24 Hrs  T(C): 36.2 (28 Aug 2023 12:00), Max: 36.6 (27 Aug 2023 20:53)  T(F): 97.2 (28 Aug 2023 12:00), Max: 97.8 (27 Aug 2023 20:53)  HR: 86 (28 Aug 2023 13:00) (51 - 107)  BP: 107/86 (28 Aug 2023 13:) (91/72 - 117/80)  BP(mean): 93 (28 Aug 2023 13:00) (75 - 93)  RR: 45 (28 Aug 2023 13:) (18 - 46)  SpO2: 100% (28 Aug 2023 13:) (68% - 100%)    Parameters below as of 28 Aug 2023 13:00  Patient On (Oxygen Delivery Method): ventilator    O2 Concentration (%): 30  Daily Height in cm: 144.78 (28 Aug 2023 07:30)    Daily Weight in k.7 (28 Aug 2023 07:30)  Mode: AC/ CMV (Assist Control/ Continuous Mandatory Ventilation)  RR (machine): 16  TV (machine): 300  FiO2: 30  PEEP: 5  ITime: 1  MAP: 8  PIP: 25    I&O's Summary  27 Aug 2023 07:01  -  28 Aug 2023 07:00  --------------------------------------------------------  IN: 2.5 mL / OUT: 0 mL / NET: 2.5 mL    28 Aug 2023 07:01  -  28 Aug 2023 14:21  --------------------------------------------------------  IN: 796.9 mL / OUT: 1900 mL / NET: -1103.1 mL      [PHYSICAL EXAM]  GEN: marked cachetic, intubated.   HEENT: normocephalic and atraumatic..    NECK: Supple.  No lymphadenopathy   LUNGS: Clear to auscultation.  HEART: S1S2 Regular rate and rhythm, no MRG  ABDOMEN: Soft, nontender, and nondistended.  Positive bowel sounds.    : No CVA tenderness  EXTREMITIES: Without edema.  SKIN: No rash       [LABS: ]                        9.1    2.06  )-----------( 112      ( 28 Aug 2023 07:31 )             25.8     CBC Full  -  ( 28 Aug 2023 07:31 )  WBC Count : 2.06 K/uL  RBC Count : 2.66 M/uL  Hemoglobin : 9.1 g/dL  Hematocrit : 25.8 %  Platelet Count - Automated : 112 K/uL  Mean Cell Volume : 97.0 fl  Mean Cell Hemoglobin : 34.2 pg  Mean Cell Hemoglobin Concentration : 35.3 gm/dL  Auto Neutrophil # : 1.61 K/uL  Auto Lymphocyte # : 0.26 K/uL  Auto Monocyte # : 0.03 K/uL  Auto Eosinophil # : 0.00 K/uL  Auto Basophil # : 0.00 K/uL  Auto Neutrophil % : 78.1 %  Auto Lymphocyte % : 12.6 %  Auto Monocyte % : 1.5 %  Auto Eosinophil % : 0.0 %  Auto Basophil % : 0.0 %      134<L>  |  105  |  44<H>  ----------------------------<  770<HH>  3.3<L>   |  19<L>  |  0.82    Ca    6.2<LL>      28 Aug 2023 09:35  Phos  3.5       Mg     2.1         TPro  4.3<L>  /  Alb  2.6<L>  /  TBili  1.4<H>  /  DBili  x   /  AST  753<H>  /  ALT  403<H>  /  AlkPhos  68  08-28  PT/INR - ( 28 Aug 2023 09:00 )   PT: 17.4 sec;   INR: 1.50 ratio    PTT - ( 28 Aug 2023 09:00 )  PTT:29.3 sec    LIVER FUNCTIONS - ( 28 Aug 2023 07:31 )  Alb: 2.6 g/dL / Pro: 4.3 g/dL / ALK PHOS: 68 U/L / ALT: 403 U/L / AST: 753 U/L / GGT: x           CARDIAC MARKERS ( 28 Aug 2023 07:31 )  x     / x     / 320 U/L / x     / 15.4 ng/mL    Urinalysis Basic - ( 28 Aug 2023 09:35 )  Color: x / Appearance: x / SG: x / pH: x  Gluc: 770 mg/dL / Ketone: x  / Bili: x / Urobili: x   Blood: x / Protein: x / Nitrite: x   Leuk Esterase: x / RBC: x / WBC x   Sq Epi: x / Non Sq Epi: x / Bacteria: x    ABG - ( 28 Aug 2023 08:52 )  pH, Arterial: 7.32  pH, Blood: x     /  pCO2: 31    /  pO2: 317   / HCO3: 16    / Base Excess: -10.1 /  SaO2: x         CBC TREND (5 Days)  WBC Count: 2.06 K/uL ( @ 07:31)  WBC Count: 2.50 K/uL ( @ 12:20)    Hemoglobin: 9.1 g/dL ( @ 07:31)  Hemoglobin: 6.9 g/dL ( @ 18:14)  Hemoglobin: 7.0 g/dL ( @ 12:20)    Hematocrit: 25.8 % ( @ 07:31)  Hematocrit: 18.7 % ( @ 18:14)  Hematocrit: 19.4 % ( @ 12:20)    Platelet Count - Automated: 112 K/uL ( @ 07:31)  Platelet Count - Automated: 123 K/uL ( @ 12:20)      Ferritin: 430 ng/mL ( @ 12:20)  Iron Total: 97 ug/dL ( @ 12:20)  Vitamin B12, Serum: 1988 pg/mL ( @ 12:20)  Folate, Serum: >20.0 ng/mL ( @ 12:20)       [MICROBIOLOGY /  VIROLOGY:]      [PATHOLOGY]       [RADIOLOGY & ADDITIONAL STUDIES:]        Patient is a 53y old  Female who presents with a chief complaint of fall, pancytopenia (28 Aug 2023 13:17)    HPI:  52yo F with PMH anemia, hypothyroidism, osteoporosis, chronic hyponatremia, depression, anxiety presents to ED on 23 due to weakness and hypoglycemia.  Patient was seen at this emergency room  ago for a fall at physical therapy .  Was diagnosed with left wrist fracture at that time.  Splint was applied and seen by orthopedics.  Recommended admission due to fall risk and unsafe discharge.  Patient refused admission and left AMA.  Patient was fully aware of  fall risk and all lab abnormalities.  States she wanted to follow-up outpatient.  Today patient became weak and aide assisted her down to the floor.  Denies hitting head or any injuries from the fall because aide assisted her.  EMS was called.  Sugar on arrival per EMS was 48 and given D10.  Sugar on arrival to emergency room was 171.  Patient denies any pain or complaints.  Denies any injuries from the fall.  At this time patient is refusing any treatment or test.  States she just wants to go home and eat.  States her sugar was low because she did not eat today but she says she will eat when she gets home. Patient removed splint that was placed 2 days ago and was not wearing splint today per EMS. Patient's sister reports that she has been getting worse for the last 6 months and has become increasingly more frail. Patient lives alone with aides as per sister. Patient has ritualistic eating habits and a diet which includes eggs, fish, and vegetables. She currently takes no medications.     Denies fever, chills, chest pain, palpitations, SOB, cough, abdominal pain, nausea, vomiting, diarrhea, constipation, urinary frequency, urgency, or dysuria, headaches, changes in vision, dizziness, numbness, tingling.  Denies recent travel, recent antibiotic use, or sick contacts.    ED Course:   Vitals: BP: , HR: , Temp: , RR: , SpO2: % on   Labs:  WBC 2.5, Hbg 7.0, plt 123, Na 129, K 2.7, BUN 55, Glu 209, Ca 7.2, ast 142, ALT 90, TSH 11.4  Urine/Blood tox: negative  UA: trace LE, squamous epithelial present, 100 glucose  CXR: unremarkable as per personal read, ofiical read states simialr to recent CXR on   Xray wrist:  There is a comminuted large horizontal fracture of the distal radius possibly with extension to the inner endplate. There is mild dorsal indentation distal fracture.  Received 40mEq KCl PO x1, 10mEq KCl x3 in the ED    (27 Aug 2023 17:14)      PAST MEDICAL & SURGICAL HISTORY:  Anemia  Depression  Chronic musculoskeletal pain  Anorexia at age 15 yrs  No significant past surgical history       HEALTH ISSUES - PROBLEM Dx:  Malnutrition, unspecified type  Pancytopenia  Left wrist fracture  Hypothyroidism  Electrolyte imbalance  Preventative health care  History of behavioral and mental health problems  Transaminitis  Weakness    Failure to thrive in adult [R62.7]  Anemia [285.9]  Depression [311]  Chronic musculoskeletal pain [729.1]  Anorexia [783.0]  No significant past surgical history [246641125]  Anemia [D64.9]  Hypokalemia [E87.6]  Hyponatremia [E87.1]      FAMILY HISTORY:  No pertinent family history in first degree relatives  Two sisters  1 lives in [place]  1 lives in [place]      [SOCIAL HISTORY: ]     smoking: non-smoker     EtOH:  none     illicit drugs: none      occupation:  disabled, prior      marital status:  single, no children.      Other:       [ALLERGIES/INTOLERANCES:]  Allergies      No Known Allergies  Intolerances      [MEDICATIONS]  MEDICATIONS  (STANDING):  chlorhexidine 0.12% Liquid 15 milliLiter(s) Oral Mucosa every 12 hours  chlorhexidine 2% Cloths 1 Application(s) Topical <User Schedule>  dextrose 50% Injectable 25 Gram(s) IV Push once  dextrose 50% Injectable 12.5 Gram(s) IV Push once  dextrose 50% Injectable 25 Gram(s) IV Push once  dextrose Oral Gel 15 Gram(s) Oral once  famotidine Injectable 20 milliGRAM(s) IV Push daily  glucagon  Injectable 1 milliGRAM(s) IntraMuscular once  heparin   Injectable 5000 Unit(s) SubCutaneous every 12 hours  lactated ringers. 1000 milliLiter(s) (70 mL/Hr) IV Continuous <Continuous>  levothyroxine Injectable 18.5 MICROGram(s) IV Push at bedtime  propofol Infusion 10 MICROgram(s)/kG/Min (1.6 mL/Hr) IV Continuous <Continuous>  thiamine IVPB 500 milliGRAM(s) IV Intermittent three times a day      MEDICATIONS  (PRN):      [REVIEW OF SYSTEMS: ]  unable to obtain  per sister  fall 2-3wks ago  poor PO intake  +Anorexia.       [VITALS SIGNS 24hrs]  Vital Signs Last 24 Hrs  T(C): 36.2 (28 Aug 2023 12:00), Max: 36.6 (27 Aug 2023 20:53)  T(F): 97.2 (28 Aug 2023 12:00), Max: 97.8 (27 Aug 2023 20:53)  HR: 86 (28 Aug 2023 13:00) (51 - 107)  BP: 107/86 (28 Aug 2023 13:) (91/72 - 117/80)  BP(mean): 93 (28 Aug 2023 13:00) (75 - 93)  RR: 45 (28 Aug 2023 13:) (18 - 46)  SpO2: 100% (28 Aug 2023 13:) (68% - 100%)    Parameters below as of 28 Aug 2023 13:00  Patient On (Oxygen Delivery Method): ventilator    O2 Concentration (%): 30  Daily Height in cm: 144.78 (28 Aug 2023 07:30)    Daily Weight in k.7 (28 Aug 2023 07:30)  Mode: AC/ CMV (Assist Control/ Continuous Mandatory Ventilation)  RR (machine): 16  TV (machine): 300  FiO2: 30  PEEP: 5  ITime: 1  MAP: 8  PIP: 25    I&O's Summary  27 Aug 2023 07:01  -  28 Aug 2023 07:00  --------------------------------------------------------  IN: 2.5 mL / OUT: 0 mL / NET: 2.5 mL    28 Aug 2023 07:01  -  28 Aug 2023 14:21  --------------------------------------------------------  IN: 796.9 mL / OUT: 1900 mL / NET: -1103.1 mL      [PHYSICAL EXAM]  GEN: marked cachetic, intubated.   HEENT: normocephalic and atraumatic..    NECK: Supple.  No lymphadenopathy   LUNGS: Clear to auscultation.  HEART: S1S2 Regular rate and rhythm, no MRG  ABDOMEN: Soft, nontender, and nondistended.  Positive bowel sounds.    : No CVA tenderness  EXTREMITIES: Without edema.  SKIN: No rash       [LABS: ]                        9.1    2.06  )-----------( 112      ( 28 Aug 2023 07:31 )             25.8     CBC Full  -  ( 28 Aug 2023 07:31 )  WBC Count : 2.06 K/uL  RBC Count : 2.66 M/uL  Hemoglobin : 9.1 g/dL  Hematocrit : 25.8 %  Platelet Count - Automated : 112 K/uL  Mean Cell Volume : 97.0 fl  Mean Cell Hemoglobin : 34.2 pg  Mean Cell Hemoglobin Concentration : 35.3 gm/dL  Auto Neutrophil # : 1.61 K/uL  Auto Lymphocyte # : 0.26 K/uL  Auto Monocyte # : 0.03 K/uL  Auto Eosinophil # : 0.00 K/uL  Auto Basophil # : 0.00 K/uL  Auto Neutrophil % : 78.1 %  Auto Lymphocyte % : 12.6 %  Auto Monocyte % : 1.5 %  Auto Eosinophil % : 0.0 %  Auto Basophil % : 0.0 %      134<L>  |  105  |  44<H>  ----------------------------<  770<HH>  3.3<L>   |  19<L>  |  0.82    Ca    6.2<LL>      28 Aug 2023 09:35  Phos  3.5       Mg     2.1         TPro  4.3<L>  /  Alb  2.6<L>  /  TBili  1.4<H>  /  DBili  x   /  AST  753<H>  /  ALT  403<H>  /  AlkPhos  68  08-28  PT/INR - ( 28 Aug 2023 09:00 )   PT: 17.4 sec;   INR: 1.50 ratio    PTT - ( 28 Aug 2023 09:00 )  PTT:29.3 sec    LIVER FUNCTIONS - ( 28 Aug 2023 07:31 )  Alb: 2.6 g/dL / Pro: 4.3 g/dL / ALK PHOS: 68 U/L / ALT: 403 U/L / AST: 753 U/L / GGT: x           CARDIAC MARKERS ( 28 Aug 2023 07:31 )  x     / x     / 320 U/L / x     / 15.4 ng/mL    Urinalysis Basic - ( 28 Aug 2023 09:35 )  Color: x / Appearance: x / SG: x / pH: x  Gluc: 770 mg/dL / Ketone: x  / Bili: x / Urobili: x   Blood: x / Protein: x / Nitrite: x   Leuk Esterase: x / RBC: x / WBC x   Sq Epi: x / Non Sq Epi: x / Bacteria: x    ABG - ( 28 Aug 2023 08:52 )  pH, Arterial: 7.32  pH, Blood: x     /  pCO2: 31    /  pO2: 317   / HCO3: 16    / Base Excess: -10.1 /  SaO2: x         CBC TREND (5 Days)  WBC Count: 2.06 K/uL ( @ 07:31)  WBC Count: 2.50 K/uL ( @ 12:20)    Hemoglobin: 9.1 g/dL ( @ 07:31)  Hemoglobin: 6.9 g/dL ( @ 18:14)  Hemoglobin: 7.0 g/dL ( @ 12:20)    Hematocrit: 25.8 % ( @ 07:31)  Hematocrit: 18.7 % ( @ 18:14)  Hematocrit: 19.4 % ( @ 12:20)    Platelet Count - Automated: 112 K/uL ( @ 07:31)  Platelet Count - Automated: 123 K/uL ( @ 12:20)      Ferritin: 430 ng/mL ( @ 12:20)  Iron Total: 97 ug/dL ( @ 12:20)  Vitamin B12, Serum: 1988 pg/mL ( @ 12:20)  Folate, Serum: >20.0 ng/mL ( @ 12:20)       [MICROBIOLOGY /  VIROLOGY:]      [PATHOLOGY]       [RADIOLOGY & ADDITIONAL STUDIES:]     < from: US Abdomen Upper Quadrant Right (23 @ 12:14) >    ACC: [id number] EXAM:  US ABDOMEN RT UPR QUADRANT   ORDERED BY: CECE LACY     PROCEDURE DATE:  2023          INTERPRETATION:  CLINICAL INFORMATION: Elevated bilirubin.    COMPARISON: CT scan abdomen pelvis 2023.    TECHNIQUE: Sonography of the right upper quadrant.    FINDINGS:    Evaluation is extremely limited.   Portable examination, patient is on a ventilator and difficulty   positioning.    Liver: Within normal limits.    Bile ducts:  The common bile duct is not adequately visualized on this exam.    Gallbladder:  No gallstones or gallbladder wall thickening noted.    Pancreas:  Not visualized.    Right kidney: Not visualized. .    Ascites: None.  Right pleural effusion.    IVC: Not visualized.    IMPRESSION:  Limited study.  No cholelithiasis noted.  --- End of Report ---  TOREY SANTOS MD; Attending Radiologist  This document has been electronically signed. Aug 28 2023  1:05PM          < from: CT Thoracic Spine No Cont (23 @ 19:58) >    ACC: [id number] EXAM:  CT THORACIC SPINE   ORDERED BY:  JOHN SALES     ACC: [id number] EXAM:  CT LUMBAR SPINE   ORDERED BY:  JOHN SALES     ACC: [id number] EXAM:  CT CHEST   ORDERED BY:  JOHNGENNA SALES     ACC: [id number] EXAM:  CT ABDOMEN AND PELVIS   ORDERED BY:  JOHN SALES     PROCEDURE DATE:  2023          INTERPRETATION:  CLINICAL INFORMATION: Fall, pain.    COMPARISON: Ultrasound abdomen 2015..    CONTRAST/COMPLICATIONS:  IV Contrast: NONE  Oral Contrast: NONE  Complications: None reported at time of study completion    PROCEDURE:  CT of the Chest, Abdomen and Pelvis was performed.  Sagittal and coronal reformats were performed.  Dedicated Axial coronal, and sagittal images of the lumbar and thoracic   spine were obtained.    FINDINGS:    Study is limited without contrast and due to lack of intra-abdominal fat   and severe cachectic body habitus.    CHEST:  LUNGS AND LARGE AIRWAYS: Patent central airways.  4 mm nodule adjacent to the left major fissure (2:17)  5 mm left upper lobe nodule (2:27).    PLEURA: No pleural effusion.  VESSELS: Within normal limits.  HEART: Heart size is normal. No pericardial effusion.  MEDIASTINUM AND IFEOMA: No lymphadenopathy.  CHEST WALL AND LOWER NECK: Within normal limits.  ABDOMEN AND PELVIS:  LIVER: Within normal limits.  BILE DUCTS: Normal caliber.  GALLBLADDER: Within normal limits.  SPLEEN: Within normal limits.  PANCREAS: Within normal limits.  ADRENALS: Within normal limits.  KIDNEYS/URETERS: Right renal staghorn type calcifications. Numerous subcentimeter calcifications left renal calcifications. Evaluation for hydronephrosis is severely limited on this noncontrast exam.  BLADDER: Mildly distended urinary bladder with mildly thickened wall. Layering calcification or wall calcification at the dependent right bladder trigone.  REPRODUCTIVE ORGANS: Uterus not visualized.  BOWEL: Moderate colonic stool burden. No bowel obstruction. Appendix is   not seen  PERITONEUM: No ascites.  VESSELS: Within normal limits.  RETROPERITONEUM/LYMPH NODES: No lymphadenopathy.  ABDOMINAL WALL: Within normal limits.  BONES: Degenerative changes. Osteopenia. Thoracolumbar scoliosis.  There is multilevel vertebral body height loss most severe at T12 and L1   and T5. These are of uncertain chronicity.    IMPRESSION:  Study is limited without contrast and due to lack of intra-abdominal fat and severe cachectic body habitus.  Mildly distended urinary bladder with mildly thickened wall. Layering calcification or wallcalcification at the dependent right bladder trigone.  Moderate colonic stool burden. No bowel obstruction.  Bilateral renal calcifications. Evaluation for hydronephrosis is not possible on this limited exam. If there is a concern ultrasound can be obtained.  There is multilevel vertebral body height loss most severe at T12 and L1 and T5. These are of uncertain chronicity.  --- End of Report ---  DAISY HDZ MD; Attending Radiologist  This document has been electronically signed. Aug 24 2023  8:14PM

## 2023-08-28 NOTE — CHART NOTE - NSCHARTNOTEFT_GEN_A_CORE
54yo F with PMH anemia, hypothyroidism, osteoporosis, chronic hyponatremia, depression, anxiety presents to ED on 8/27/23 due to weakness and hypoglycemia.  Patient was seen at this emergency room 8/24 ago for a fall at physical therapy 8/23.  Was diagnosed with left wrist fracture at that time.  Splint was applied and seen by orthopedics.  Recommended admission due to fall risk and unsafe discharge.  Patient refused admission and left AMA.  Patient was fully aware of  fall risk and all lab abnormalities.  States she wanted to follow-up outpatient.  Today patient became weak and aide assisted her down to the floor.  Denies hitting head or any injuries from the fall because aide assisted her.  EMS was called.  Sugar on arrival per EMS was 48 and given D10.  Sugar on arrival to emergency room was 171.  Patient denies any pain or complaints.  Denies any injuries from the fall.  At this time patient is refusing any treatment or test.  States she just wants to go home and eat.  States her sugar was low because she did not eat today but she says she will eat when she gets home. Patient removed splint that was placed 2 days ago and was not wearing splint today per EMS. Patient's sister reports that she has been getting worse for the last 6 months and has become increasingly more frail. Patient lives alone with aides as per sister. Patient has ritualistic eating habits and a diet which includes eggs, fish, and vegetables. She currently takes no medications.     Above HPI noted..     Called to patient's bedside by RN, pt unresponsive, with pulse, non verbal. On assessment pt not answering questions, shallow respirations. FS 16, SR 50 -->40's. RRT called for unresponsiveness and hypoglycemia. Plan below.       PLAN:     - s/p 1 unit PRBC for hgb 6.9 on admission   - FS 16, STAT D50  - s/p RRT, now intubated  - transferred to ICU  - sister Gracia  called made aware  - Dr. Douglass called 52yo F with PMH anemia, hypothyroidism, osteoporosis, chronic hyponatremia, depression, anxiety presents to ED on 8/27/23 due to weakness and hypoglycemia.  Patient was seen at this emergency room 8/24 ago for a fall at physical therapy 8/23.  Was diagnosed with left wrist fracture at that time.  Splint was applied and seen by orthopedics.  Recommended admission due to fall risk and unsafe discharge.  Patient refused admission and left AMA.  Patient was fully aware of  fall risk and all lab abnormalities.  States she wanted to follow-up outpatient.  Today patient became weak and aide assisted her down to the floor.  Denies hitting head or any injuries from the fall because aide assisted her.  EMS was called.  Sugar on arrival per EMS was 48 and given D10.  Sugar on arrival to emergency room was 171.  Patient denies any pain or complaints.  Denies any injuries from the fall.  At this time patient is refusing any treatment or test.  States she just wants to go home and eat.  States her sugar was low because she did not eat today but she says she will eat when she gets home. Patient removed splint that was placed 2 days ago and was not wearing splint today per EMS. Patient's sister reports that she has been getting worse for the last 6 months and has become increasingly more frail. Patient lives alone with aides as per sister. Patient has ritualistic eating habits and a diet which includes eggs, fish, and vegetables. She currently takes no medications.     Above HPI noted..     Called to patient's bedside by RN, pt unresponsive, with pulse, non verbal. On assessment pt not answering questions, shallow respirations. FS 16, SR 50 -->40's. RRT called for unresponsiveness and hypoglycemia. Plan below.       PLAN:     - s/p 1 unit PRBC for hgb 6.9 on admission   - FS 16, STAT D50  - HR 20-30, Atropine x1 given --->  HR >60  - s/p RRT, now intubated  - transferred to ICU  - sister Gracia Sharma  called made aware  - Dr. Douglass called

## 2023-08-28 NOTE — DIETITIAN INITIAL EVALUATION ADULT - PROBLEM SELECTOR PLAN 1
On arrival, WBC 2.5, Hgb 7.0, plt 123. normocytic. concern for malnutrition    - encourage PO intake  - trend CBC  - f/u iron studies  - f/u B12, folate  -1 u PRBC ordered  - maintain active type and screen, transfuse for Hgb < 7  -hem onc dr Schultz consulted

## 2023-08-28 NOTE — CARE COORDINATION ASSESSMENT. - NSPASTMEDSURGHISTORY_GEN_ALL_CORE_FT
PAST MEDICAL & SURGICAL HISTORY:  Anorexia  at age 15 yrs      Chronic musculoskeletal pain      Depression      Anemia      No significant past surgical history

## 2023-08-28 NOTE — DIETITIAN INITIAL EVALUATION ADULT - PROBLEM SELECTOR PLAN 4
Fall on 8/23, presented to ED on 8/24 and diagnosed with fracture. Xray wrist 8/27 :There is a comminuted large horizontal fracture of the distal radius possibly with extension to the inner endplate. There is mild dorsal indentation distal fracture. pt given splint on previous ED visit    - splint ordered for left wrist  - ortho recommended split and f/u outpatient ortho on 8/24

## 2023-08-28 NOTE — DIETITIAN INITIAL EVALUATION ADULT - PROBLEM SELECTOR PLAN 3
Patient presenting due to weakness, found to be hypoglycemic by EMS to 48, hyperglycemic on arrival. recent fall on 8/23 leading to broken wrist. concern for malnutrition vs gait disturbance vs deconditioning    - started on [id number] diet  - f/u nutrition recs  - trend glucose  - f/u PT/OT  - fall risk

## 2023-08-28 NOTE — CARE COORDINATION ASSESSMENT. - NS [facility] DISCIPLINE
Mckayla is calling   with scheduled referral appointment  Please return call to update Provider on status. The best time to call is  Anytime    Thank you! home aide 12 hrs per week.

## 2023-08-29 NOTE — PROGRESS NOTE ADULT - SUBJECTIVE AND OBJECTIVE BOX
INTERVAL HPI/OVERNIGHT EVENTS: No acute overnight events occurred. Pt is a 52 y/o female with PMHx of anemia, osteoporosis, depression, anxiety, chronic hyponatremia presents to [facility] ED 8/27/23 d/t weakness and hypoglycemia.     SUBJECTIVE: Seen and examined pt at bedside. Pt intubated, weaning off propofol, open eyes slightly to arousal, otherwise unresponsive.     Review of Systems:  Unable to obtain d/t intubation    ICU Vital Signs Last 24 Hrs  T(C): 35.4 (29 Aug 2023 08:00), Max: 37.2 (28 Aug 2023 20:15)  T(F): 95.8 (29 Aug 2023 08:00), Max: 99 (28 Aug 2023 20:15)  HR: 71 (29 Aug 2023 11:00) (66 - 95)  BP: 111/80 (29 Aug 2023 10:30) (83/62 - 132/92)  BP(mean): 92 (29 Aug 2023 10:30) (68 - 108)  ABP: --  ABP(mean): --  RR: 24 (29 Aug 2023 11:00) (14 - 46)  SpO2: 100% (29 Aug 2023 11:00) (91% - 100%)    O2 Parameters below as of 29 Aug 2023 07:30  Patient On (Oxygen Delivery Method): ventilator, AC16,300,5    O2 Concentration (%): 30        Mode: AC/ CMV (Assist Control/ Continuous Mandatory Ventilation), RR (machine): 16, TV (machine): 300, FiO2: 30, PEEP: 5  08-28-23 @ 07:01  -  08-29-23 @ 07:00  --------------------------------------------------------  IN: 2407.4 mL / OUT: 3110 mL / NET: -702.6 mL        CAPILLARY BLOOD GLUCOSE  126 (29 Aug 2023 11:00)      POCT Blood Glucose.: 126 mg/dL (29 Aug 2023 10:59)      I&O's Summary    28 Aug 2023 07:01  -  29 Aug 2023 07:00  --------------------------------------------------------  IN: 2407.4 mL / OUT: 3110 mL / NET: -702.6 mL        PHYSICAL EXAM:  Gen: NAD, intubated, extremely emaciated 2/2 malnourishment  Neuro: opens eyes in response to name  HEENT: NC/AT, PERRLA, EOMI  Resp: CTA b/l, no w/r/r  Cardio: RRR, s1/s2, no m/r/g  Abd: soft, NT, ND, bowels sounds present in all 4 quadrants, no palpable masses  Ext: frail, 2+ peripheral pulses b/l, L hand moderate edema and discoloration 2/2 L distal radius fx, no LLE edema, cyanosis, clubbing  Skin: multiple bandages in place on 4 extremities    Meds:      dextrose 50% Injectable IV Push  dextrose 50% Injectable IV Push  dextrose 50% Injectable IV Push  dextrose Oral Gel Oral  glucagon  Injectable IntraMuscular      propofol Infusion IV Continuous      heparin   Injectable SubCutaneous    famotidine Injectable IV Push      dextrose 5% + lactated ringers IV Continuous  multivitamin/minerals/iron Oral Solution (CENTRUM) Oral  thiamine IVPB IV Intermittent      chlorhexidine 0.12% Liquid Oral Mucosa  chlorhexidine 2% Cloths Topical                              9.8    3.24  )-----------( 90       ( 29 Aug 2023 05:33 )             27.1       08-29    140  |  114<H>  |  28<H>  ----------------------------<  117<H>  3.9   |  19<L>  |  0.41<L>    Ca    7.1<L>      29 Aug 2023 05:33  Phos  4.0     08-29  Mg     1.8     08-29    TPro  4.7<L>  /  Alb  2.7<L>  /  TBili  1.6<H>  /  DBili  x   /  AST  506<H>  /  ALT  361<H>  /  AlkPhos  76  08-29      CARDIAC MARKERS ( 28 Aug 2023 07:31 )  x     / x     / 320 U/L / x     / 15.4 ng/mL      PT/INR - ( 28 Aug 2023 09:00 )   PT: 17.4 sec;   INR: 1.50 ratio         PTT - ( 28 Aug 2023 09:00 )  PTT:29.3 sec  Urinalysis Basic - ( 29 Aug 2023 05:33 )    Color: x / Appearance: x / SG: x / pH: x  Gluc: 117 mg/dL / Ketone: x  / Bili: x / Urobili: x   Blood: x / Protein: x / Nitrite: x   Leuk Esterase: x / RBC: x / WBC x   Sq Epi: x / Non Sq Epi: x / Bacteria: x      Clean Catch Clean Catch (Midstream)   <10,000 CFU/mL Normal Urogenital Micki -- 08-27 @ 13:20              Radiology:   < from: Xray Chest 1 View-PORTABLE IMMEDIATE (Xray Chest 1 View-PORTABLE IMMEDIATE .) (08.29.23 @ 05:18) >  ACC: [id number] EXAM:  XR CHEST PORTABLE IMMED 1V   ORDERED BY:  ELISSA UMANA     PROCEDURE DATE:  08/29/2023          INTERPRETATION:  EXAM: XR CHEST IMMEDIATE    INDICATION: Admitting Dxs: R62.7 ADULT FAILURE TO THRIVE Shortness of   Breath PXR    COMPARISON: Earlier exam at 3:27 PM on August 28    IMPRESSION: Endotracheal tube tip above the candie. NG tube tip overlying   the matter with what appears to be some distended bowel loops. Increased   interstitial markings in the left para and infrahilar region and behind   the heart. Areas of hyperaeration especially on the right. Especially on   the right. Continued follow-up suggested    --- End of Report ---        < end of copied text >      < from: Xray Wrist 3 Views, Left (08.27.23 @ 13:44) >  ACC: [id number] EXAM:  XR CHEST AP OR PA 1V   ORDERED BY: HENRIK ENNIS     ACC: [id number] EXAM:  XR WRIST COMP MIN 3 VIEWS LT   ORDERED BY: HENRIK ENNIS     PROCEDURE DATE:  08/27/2023          INTERPRETATION:  Left wrist and chest. Patient had a fall 3 days ago.    Left wrist.    There is a comminuted large horizontal fracture of the distal radius   possibly with extension to the inner endplate.    There is mild dorsal indentation distal fracture.    Findings are similar to August 24.    IMPRESSION: Again noted is distal radial fracture as above.    --- End of Report ---    < end of copied text >      Bedside Ultrasound:      Tubes/Lines: mike in place, NG tube in place, Intubated, 3 peripheral IVs      GLOBAL ISSUE/BEST PRACTICE:  Analgesia: n/a  Sedation: weaning off propofol  HOB elevation: Y  Stress ulcer prophylaxis: d/c ppi  VTE prophylaxis: HSQ  Glycemic control: n/a  Nutrition: starting tube feeds    CODE STATUS: full code

## 2023-08-29 NOTE — PROGRESS NOTE ADULT - ASSESSMENT
54yo F with PMH anemia, hypothyroidism, osteoporosis, chronic hyponatremia, depression, anxiety presents to ED on 8/27/23 due to weakness and hypoglycemia. Admitted to medicine for weakness and fall, pancytopenia.

## 2023-08-29 NOTE — PROGRESS NOTE ADULT - SUBJECTIVE AND OBJECTIVE BOX
Patient is a 53y old  Female who presents with a chief complaint of fall, pancytopenia (27 Aug 2023 17:14)       HPI:  54yo F with PMH anemia, hypothyroidism, osteoporosis, chronic hyponatremia, depression, anxiety presents to ED on 8/27/23 due to weakness and hypoglycemia.  Patient was seen at this emergency room 8/24 ago for a fall at physical therapy 8/23.  Was diagnosed with left wrist fracture at that time.  Splint was applied and seen by orthopedics.  Recommended admission due to fall risk and unsafe discharge.  Patient refused admission and left AMA.  Patient was fully aware of  fall risk and all lab abnormalities.  States she wanted to follow-up outpatient.  Today patient became weak and aide assisted her down to the floor.  Denies hitting head or any injuries from the fall because aide assisted her.  EMS was called.  Sugar on arrival per EMS was 48 and given D10.  Sugar on arrival to emergency room was 171.  Patient denies any pain or complaints.  Denies any injuries from the fall.  At this time patient is refusing any treatment or test.  States she just wants to go home and eat.  States her sugar was low because she did not eat today but she says she will eat when she gets home. Patient removed splint that was placed 2 days ago and was not wearing splint today per EMS. Patient's sister reports that she has been getting worse for the last 6 months and has become increasingly more frail. Patient lives alone with aides as per sister. Patient has ritualistic eating habits and a diet which includes eggs, fish, and vegetables. She currently takes no medications.   Renal consulted for multiple electrolyte abnormalities.  She states she wants to urinate.  Denies any N/V/SOB.  Eating. States she had edema.          PAST MEDICAL & SURGICAL HISTORY:  Anemia      Depression      Chronic musculoskeletal pain      Anorexia  at age 15 yrs      No significant past surgical history           FAMILY HISTORY:  No pertinent family history in first degree relatives    [place]    Social History:Non smoker    MEDICATIONS  (STANDING):  dextrose 50% Injectable 25 Gram(s) IV Push once  dextrose 50% Injectable 12.5 Gram(s) IV Push once  dextrose 50% Injectable 25 Gram(s) IV Push once  dextrose Oral Gel 15 Gram(s) Oral once  glucagon  Injectable 1 milliGRAM(s) IntraMuscular once  levothyroxine 25 MICROGram(s) Oral daily  potassium chloride    Tablet ER 40 milliEquivalent(s) Oral once    MEDICATIONS  (PRN):  melatonin 3 milliGRAM(s) Oral at bedtime PRN Insomnia   Meds reviewed    Allergies    No Known Allergies    Intolerances         REVIEW OF SYSTEMS:    Review of Systems:   Constitutional: weakness  HEENT: Denies headaches and dizziness  Respiratory: denies SOB, cough, or wheezing  Cardiovascular: denies CP, palpitations  Gastrointestinal: Denies nausea, denies vomiting, diarrhea, constipation, abdominal pain, or bloody stools  Genitourinary: denies painful urination, increased frequency, urgency, or bloody urine  Skin: denies rashes or itching  Musculoskeletal: denies muscle aches, joint swelling  Neurologic: Denies generalized weakness, denies loss of sensation, numbness, or tingling      ICU Vital Signs Last 24 Hrs  T(C): 35.4 (29 Aug 2023 08:00), Max: 37.2 (28 Aug 2023 20:15)  T(F): 95.8 (29 Aug 2023 08:00), Max: 99 (28 Aug 2023 20:15)  HR: 93 (29 Aug 2023 08:30) (66 - 102)  BP: 125/89 (29 Aug 2023 08:30) (83/62 - 132/92)  BP(mean): 102 (29 Aug 2023 08:30) (68 - 108)  ABP: --  ABP(mean): --  RR: 23 (29 Aug 2023 08:30) (14 - 46)  SpO2: 100% (29 Aug 2023 08:30) (91% - 100%)    O2 Parameters below as of 29 Aug 2023 07:30  Patient On (Oxygen Delivery Method): ventilator, AC16,300,5    O2 Concentration (%): 30            PHYSICAL EXAM:    GENERAL: cachectic, fraill appearing  HEAD:  Atraumatic, Normocephalic  EYES: EOMI, conjunctiva and sclera clear  ENMT: No Drainage from nares, No drainage from ears  NECK: Supple, neck  veins full  NERVOUS SYSTEM:  Awake and Alert  CHEST/LUNG: Clear to percussion bilaterally; No rales, rhonchi, wheezing, or rubs  HEART: Regular rate and rhythm; No murmurs, rubs, or gallops  ABDOMEN: Soft, Nontender, Nondistended; Bowel sounds present  EXTREMITIES:  No Edema  SKIN: + Ecchoymosis LE      LABS:                                   9.8    3.24  )-----------( 90       ( 29 Aug 2023 05:33 )             27.1     08-29    140  |  114<H>  |  28<H>  ----------------------------<  117<H>  3.9   |  19<L>  |  0.41<L>    Ca    7.1<L>      29 Aug 2023 05:33  Phos  4.0     08-29  Mg     1.8     08-29    TPro  4.7<L>  /  Alb  2.7<L>  /  TBili  1.6<H>  /  DBili  x   /  AST  506<H>  /  ALT  361<H>  /  AlkPhos  76  08-29    PT/INR - ( 28 Aug 2023 09:00 )   PT: 17.4 sec;   INR: 1.50 ratio         PTT - ( 28 Aug 2023 09:00 )  PTT:29.3 sec  Urinalysis Basic - ( 29 Aug 2023 05:33 )    Color: x / Appearance: x / SG: x / pH: x  Gluc: 117 mg/dL / Ketone: x  / Bili: x / Urobili: x   Blood: x / Protein: x / Nitrite: x   Leuk Esterase: x / RBC: x / WBC x   Sq Epi: x / Non Sq Epi: x / Bacteria: x        ABG - ( 28 Aug 2023 08:52 )  pH, Arterial: 7.32  pH, Blood: x     /  pCO2: 31    /  pO2: 317   / HCO3: 16    / Base Excess: -10.1 /  SaO2: x

## 2023-08-29 NOTE — PROGRESS NOTE ADULT - SUBJECTIVE AND OBJECTIVE BOX
HPI: 52 y/o F w/ pmh of anorexia, severe malnutrition, hypothyroid, OP, hx of multiple fx, chronic hypoNA, FTT, presented to admitted to [facility] for weakness and hypoglycemia, yesterday had an RRT on the floor after pt was found hypoglycemic w/ an FS of 16 and thelma-arrest. Pt was emergently intubated given dextrose and admitted to the MICU for acute hypoxic resp failure, anorexia w/ severe malnutrition, pancytopenia, hypoK/na and hypoglycemia.    24 hour events: tonight repeat labs performed with gradual improvement in FS and electrolytes. Pt remains sedated on vent w/ minimal vent setting and now off pressors.    Review of Systems: Intubated and sedated    T(F): 99 (08-28-23 @ 20:15), Max: 99 (08-28-23 @ 20:15)  HR: 85 (08-28-23 @ 22:00) (51 - 107)  BP: 102/74 (08-28-23 @ 22:00) (91/72 - 117/80)  RR: 20 (08-28-23 @ 22:00) (17 - 46)  SpO2: 100% (08-28-23 @ 22:00) (68% - 100%)  Wt(kg): --    Mode: AC/ CMV (Assist Control/ Continuous Mandatory Ventilation), RR (machine): 16, TV (machine): 300, FiO2: 30, PEEP: 5  08-27-23 @ 07:01  -  08-28-23 @ 07:00  --------------------------------------------------------  IN: 2.5 mL / OUT: 0 mL / NET: 2.5 mL    08-28-23 @ 07:01  -  08-29-23 @ 00:05  --------------------------------------------------------  IN: 1548.5 mL / OUT: 2250 mL / NET: -701.5 mL        CAPILLARY BLOOD GLUCOSE      POCT Blood Glucose.: 248 mg/dL (28 Aug 2023 23:51)      I&O's Summary    27 Aug 2023 07:01  -  28 Aug 2023 07:00  --------------------------------------------------------  IN: 2.5 mL / OUT: 0 mL / NET: 2.5 mL    28 Aug 2023 07:01  -  29 Aug 2023 00:05  --------------------------------------------------------  IN: 1548.5 mL / OUT: 2250 mL / NET: -701.5 mL        Physical Exam:   Gen: chronically ill appearing and cachexia  Neuro: intubated and sedated  HEENT: NC/AT  Resp: good air entry b/l  CVS: +RRR  Abd: Abd soft, ND  Ext: no edema   Skin: warm/dry    Meds:      dextrose 50% Injectable IV Push  dextrose 50% Injectable IV Push  dextrose 50% Injectable IV Push  dextrose Oral Gel Oral  glucagon  Injectable IntraMuscular  levothyroxine Injectable IV Push      propofol Infusion IV Continuous      heparin   Injectable SubCutaneous    famotidine Injectable IV Push      lactated ringers. IV Continuous  potassium phosphate IVPB IV Intermittent  thiamine IVPB IV Intermittent      chlorhexidine 0.12% Liquid Oral Mucosa  chlorhexidine 2% Cloths Topical                              11.9   4.56  )-----------( 100      ( 28 Aug 2023 16:20 )             32.3       08-28    137  |  112<H>  |  35<H>  ----------------------------<  313<H>  4.0   |  19<L>  |  0.66    Ca    7.2<L>      28 Aug 2023 22:47  Phos  2.2     08-28  Mg     1.9     08-28    TPro  4.3<L>  /  Alb  2.6<L>  /  TBili  1.4<H>  /  DBili  x   /  AST  753<H>  /  ALT  403<H>  /  AlkPhos  68  08-28    Lactate 1.9           08-28 @ 07:31      CARDIAC MARKERS ( 28 Aug 2023 07:31 )  x     / x     / 320 U/L / x     / 15.4 ng/mL      PT/INR - ( 28 Aug 2023 09:00 )   PT: 17.4 sec;   INR: 1.50 ratio         PTT - ( 28 Aug 2023 09:00 )  PTT:29.3 sec  Urinalysis Basic - ( 28 Aug 2023 22:47 )    Color: x / Appearance: x / SG: x / pH: x  Gluc: 313 mg/dL / Ketone: x  / Bili: x / Urobili: x   Blood: x / Protein: x / Nitrite: x   Leuk Esterase: x / RBC: x / WBC x   Sq Epi: x / Non Sq Epi: x / Bacteria: x      Clean Catch Clean Catch (Midstream)   <10,000 CFU/mL Normal Urogenital Micki -- 08-27 @ 13:20      Radiology:     < from: Xray Chest 1 View- PORTABLE-Urgent (Xray Chest 1 View- PORTABLE-Urgent .) (08.28.23 @ 15:51) >  ACC: [id number] EXAM:  XR CHEST PORTABLE URGENT 1V   ORDERED BY: CECE LACY     PROCEDURE DATE:  08/28/2023          INTERPRETATION:  DATE OF STUDY: 8/28/23 at 3:33PM    PRIOR: Earlier 8/28/23 exam    CLINICAL INDICATION: Assess NG tube    TECHNIQUE: AP radiograph of the chest.    FINDINGS:  ET tube tip above candie.  Enteric tube tip in stomach.  Heart size within normal limits.  Reaeration of left lung with linear atelectatic changes in mid and lower   left lung. Right lung is clear.  No pneumothorax.    IMPRESSION:  ET tube tip now above candie with reaeration of left lung.  Enteric tube tip in stomach.    --- End of Report ---      DAISY MARTINS MD; Attending Radiologist  This document has been electronically signed. Aug 28 2023  5:03PM    < end of copied text >      CODE STATUS: FULL C ODE    CRITICAL CARE TIME SPENT: 38 mins  (Assessing presenting problems of acute illness, which pose high probability of life threatening deterioration or end organ damage/dysfunction, as well as medical decision making including initiating plan of care, reviewing data, reviewing radiologic exams, discussing with multidisciplinary team,  discussing goals of care with patient/family, and writing this note.  Non-inclusive of procedures performed)

## 2023-08-29 NOTE — PROGRESS NOTE ADULT - ASSESSMENT
54 y/o F w/ pmh of anorexia, severe malnutrition, hypothyroid, OP, hx of multiple fx, chronic hypoNA, FTT, presented to admitted to [facility] for weakness and hypoglycemia, yesterday had an RRT on the floor after pt was found hypoglycemic w/ an FS of 16 and thelma-arrest. Pt was emergently intubated given dextrose and admitted to the MICU for acute hypoxic resp failure, anorexia w/ severe malnutrition, pancytopenia, hypoK/na and hypoglycemia. 52 y/o F w/ pmh of anorexia, severe malnutrition, hypothyroid, OP, hx of multiple fx, chronic hypoNA, FTT, presented to admitted to [facility] for weakness and hypoglycemia, yesterday had an RRT on the floor after pt was found hypoglycemic w/ an FS of 16 and thelma-arrest. Pt was emergently intubated given dextrose and admitted to the MICU for acute hypoxic resp failure, anorexia w/ severe malnutrition, pancytopenia, hypoK/na and hypoglycemia.      -Neuro: Intubated and sedated on prop  -Cardiac: Distributive shock now resolved and off pressors, maintain MAP >65  -Resp: Acute Hypoxic resp failure cont lung protective ventilation, will titrate vent setting as tolerated to maintain o2 >90%  -GI: cont TF as ordered/tolerated, GI ppx w/ pepcid  -Renal: Renal function stable cont to monitor along w/ lytes, HypoNa/hypoK improving cont to trend, hypophos will order additional kphos  -Endo: Hypoglycemia s/p D5 amps now w/ hyperglycemia gradually improving cont to monitor FS closely  -ID: monitor off IVAB  -Heme: DVT ppx w/ heparin  -Dispo: Pt remains in the MICU, currently full code, GOC ongoing, dispo pending ICU course

## 2023-08-29 NOTE — PROGRESS NOTE ADULT - SUBJECTIVE AND OBJECTIVE BOX
INTERVAL HPI/OVERNIGHT EVENTS:  Patient seen and examined at bedside.  Patient intubated, opens eyes to verbal stimuli, but unable to follow any commands. Unable to obtain comprehensive ROS and HPI due to medical condition.    MEDICATIONS  (STANDING):  chlorhexidine 0.12% Liquid 15 milliLiter(s) Oral Mucosa every 12 hours  chlorhexidine 2% Cloths 1 Application(s) Topical <User Schedule>  dextrose 50% Injectable 25 Gram(s) IV Push once  dextrose 50% Injectable 12.5 Gram(s) IV Push once  dextrose 50% Injectable 25 Gram(s) IV Push once  dextrose Oral Gel 15 Gram(s) Oral once  glucagon  Injectable 1 milliGRAM(s) IntraMuscular once  heparin   Injectable 5000 Unit(s) SubCutaneous every 12 hours  lactated ringers 1000 milliLiter(s) (70 mL/Hr) IV Continuous <Continuous>  multivitamin/minerals/iron Oral Solution (CENTRUM) 15 milliLiter(s) Oral daily  propofol Infusion 10 MICROgram(s)/kG/Min (1.6 mL/Hr) IV Continuous <Continuous>  thiamine IVPB 200 milliGRAM(s) IV Intermittent daily    MEDICATIONS  (PRN):      Allergies    No Known Allergies    Intolerances      Vital Signs Last 24 Hrs  T(C): 36.7 (29 Aug 2023 15:05), Max: 37.2 (28 Aug 2023 20:15)  T(F): 98.1 (29 Aug 2023 15:05), Max: 99 (28 Aug 2023 20:15)  HR: 109 (29 Aug 2023 17:15) (66 - 112)  BP: 143/98 (29 Aug 2023 17:00) (83/62 - 143/98)  BP(mean): 117 (29 Aug 2023 17:00) (64 - 117)  RR: 28 (29 Aug 2023 17:15) (12 - 31)  SpO2: 100% (29 Aug 2023 17:15) (91% - 100%)    Parameters below as of 29 Aug 2023 17:15  Patient On (Oxygen Delivery Method): nasal cannula w/ humidification  O2 Flow (L/min): 2      08-28 @ 07:01  -  08-29 @ 07:00  --------------------------------------------------------  IN: 2407.4 mL / OUT: 3110 mL / NET: -702.6 mL    08-29 @ 07:01  -  08-29 @ 18:08  --------------------------------------------------------  IN: 1012 mL / OUT: 0 mL / NET: 1012 mL      Physical Exam:  General: frail, cachectic, ill appearing, intubated  Neurology: intubated, opens eyes to verbal stimuli, does not follow commands  Respiratory: decreased breath sounds B/L bases  CV: RRR, S1S2  Abdominal: Soft, NT, ND +BS  Extremities: No edema, + peripheral pulses      LABS:                        9.8    3.24  )-----------( 90       ( 29 Aug 2023 05:33 )             27.1     08-29    139  |  115<H>  |  29<H>  ----------------------------<  186<H>  4.1   |  19<L>  |  0.52    Ca    7.1<L>      29 Aug 2023 14:57  Phos  2.7     08-29  Mg     1.7     08-29    TPro  4.7<L>  /  Alb  2.7<L>  /  TBili  1.6<H>  /  DBili  x   /  AST  506<H>  /  ALT  361<H>  /  AlkPhos  76  08-29    PT/INR - ( 28 Aug 2023 09:00 )   PT: 17.4 sec;   INR: 1.50 ratio         PTT - ( 28 Aug 2023 09:00 )  PTT:29.3 sec  Urinalysis Basic - ( 29 Aug 2023 14:57 )    Color: x / Appearance: x / SG: x / pH: x  Gluc: 186 mg/dL / Ketone: x  / Bili: x / Urobili: x   Blood: x / Protein: x / Nitrite: x   Leuk Esterase: x / RBC: x / WBC x   Sq Epi: x / Non Sq Epi: x / Bacteria: x        RADIOLOGY & ADDITIONAL TESTS:

## 2023-08-29 NOTE — CONSULT NOTE ADULT - SUBJECTIVE AND OBJECTIVE BOX
*********CHARTING IN PROGRESS, INCOMPLETE NOTE***********      53y Female presents with L Distal radius Fx s/p MF. Pt has pmh of anorexia, severe malnutrition, hypothyroid, OP, hx of multiple fx, chronic hypoNA, FTT, presented to admitted to [facility] for weakness and hypoglycemia, yesterday had an RRT on the floor after pt was found hypoglycemic w/ an FS of 16 and thelma-arrest. Pt was emergently intubated given dextrose and admitted to the MICU for acute hypoxic resp failure, anorexia w/ severe malnutrition, pancytopenia, hypoK/na and hypoglycemia.  As part of the ED admission, she did have a L wrist XR; she had a subsequent XR in the ICU which displayed a L Distal radius Fx. Ortho Consulted by ICU.     Pt Currently intubated and sedated, neuro status unknown at this time.       Vital Signs Last 24 Hrs  T(C): 36.1 (29 Aug 2023 12:03), Max: 37.2 (28 Aug 2023 20:15)  T(F): 97 (29 Aug 2023 12:03), Max: 99 (28 Aug 2023 20:15)  HR: 78 (29 Aug 2023 12:25) (66 - 95)  BP: 111/80 (29 Aug 2023 10:30) (83/62 - 132/92)  BP(mean): 92 (29 Aug 2023 10:30) (68 - 108)  RR: 24 (29 Aug 2023 11:00) (14 - 45)  SpO2: 100% (29 Aug 2023 12:25) (91% - 100%)    Parameters below as of 29 Aug 2023 07:30  Patient On (Oxygen Delivery Method): ventilator, AC16,300,5    O2 Concentration (%): 30    PHYSICAL EXAM  General: NAD, Awake and Alert  RUE  Skin intact  No ecchymosis/redness/warmth  No TTP   FROM Shoulder/Elbow/Wrist/Fingers  + AIN/PIN/U/M/R/Musc/Ax  SILT Ax/Musc/U/M/R  2+ Radial  Compartments soft and compressible    RLE/LLE: *****  Skin intact  No Ecchymosis/redness/warmth noted  No TTP   Able to SLR  Neg Axial Load/Log Roll  FROM Hip/Knee/Ankle  + EHL/FHL/GS/TA  SILT Tib/SPN/DPN/Sural/Saph  DP/PT Palpable  No Calf TTP  Compartments soft compressible       IMAGING:  XR :   CT :    Assessment/Plan:  53y Female with *****    -Pain control as needed  -DVT ppx: Per primary  -WBAT // PWB // NWB  - Ortho stable for dc  - No acute orthopedic surgical intervention  - Follow up with **** in office within **** week. Please call office for appointment.   -Will discuss with attending, and advise if plan changes     53y Female presents with L Distal radius Fx s/p MF. Pt has pmh of anorexia, severe malnutrition, hypothyroid, OP, hx of multiple fx, chronic hypoNA, FTT, presented to admitted to [facility] hospital for weakness and hypoglycemia, yesterday had an RRT on the floor after pt was found hypoglycemic w/ an FS of 16 and thelma-arrest. Pt was emergently intubated given dextrose and admitted to the MICU for acute hypoxic resp failure, anorexia w/ severe malnutrition, pancytopenia, hypoK/na and hypoglycemia.  As part of the ED admission, she did have a L wrist XR; she had a subsequent XR in the ICU which displayed a L Distal radius Fx. Ortho Consulted by ICU. Prior to admission, patient had fallen while at , come to [facility] and was putting a splint for a distal radius fx.  She took the splint off.  She fell again a few days later which led to this admission.      Pt Currently intubated and sedated, neuro status unknown at this time.       Vital Signs Last 24 Hrs  T(C): 36.1 (29 Aug 2023 12:03), Max: 37.2 (28 Aug 2023 20:15)  T(F): 97 (29 Aug 2023 12:03), Max: 99 (28 Aug 2023 20:15)  HR: 78 (29 Aug 2023 12:25) (66 - 95)  BP: 111/80 (29 Aug 2023 10:30) (83/62 - 132/92)  BP(mean): 92 (29 Aug 2023 10:30) (68 - 108)  RR: 24 (29 Aug 2023 11:00) (14 - 45)  SpO2: 100% (29 Aug 2023 12:25) (91% - 100%)    Parameters below as of 29 Aug 2023 07:30  Patient On (Oxygen Delivery Method): ventilator, AC16,300,5    O2 Concentration (%): 30    PHYSICAL EXAM  General: NAD, Awake and Alert.  Cachetic appearing.   RUE  Skin intact  multiple skin tears throughout.    ecchymosis noted at the wrist.   2+ Radial  Compartments soft and compressible    IMAGING:  XR : XR L wrist, dorsally angulated distal radius fx.     Sugar tong splint was applied.     Assessment/Plan:  53y Female with L distal Radius Fx.      -Pain control as needed  -DVT ppx: Per primary  -NWB LUE  - Ortho stable for dc  - No acute orthopedic surgical intervention  -Will discuss with attending, and advise if plan changes     53y Female presents with L Distal radius Fx s/p MF. Pt has pmh of anorexia, severe malnutrition, hypothyroid, OP, hx of multiple fx, chronic hypoNA, FTT, presented to admitted to [facility] for weakness and hypoglycemia, yesterday had an RRT on the floor after pt was found hypoglycemic w/ an FS of 16 and thelma-arrest. Pt was emergently intubated given dextrose and admitted to the MICU for acute hypoxic resp failure, anorexia w/ severe malnutrition, pancytopenia, hypoK/na and hypoglycemia.  Pt had fallen about a few days before admission, she fell at PT injuring her L wrist. XR revealed a L Distal radius fx and she was splinted. She took off the splint. She fell again, which led to this admission she had a subsequent XR in the ICU which displayed a L Distal radius      Pt Currently intubated and sedated, neuro status unknown at this time.       Vital Signs Last 24 Hrs  T(C): 36.1 (29 Aug 2023 12:03), Max: 37.2 (28 Aug 2023 20:15)  T(F): 97 (29 Aug 2023 12:03), Max: 99 (28 Aug 2023 20:15)  HR: 78 (29 Aug 2023 12:25) (66 - 95)  BP: 111/80 (29 Aug 2023 10:30) (83/62 - 132/92)  BP(mean): 92 (29 Aug 2023 10:30) (68 - 108)  RR: 24 (29 Aug 2023 11:00) (14 - 45)  SpO2: 100% (29 Aug 2023 12:25) (91% - 100%)    Parameters below as of 29 Aug 2023 07:30  Patient On (Oxygen Delivery Method): ventilator, AC16,300,5    O2 Concentration (%): 30    PHYSICAL EXAM  General: NAD, Awake and Alert.  Cachetic appearing.   LUE  Skin intact  multiple skin tears throughout, proximal forearm.    ecchymosis noted at the wrist.   2+ Radial  Compartments soft and compressible    IMAGING:  XR : XR L wrist, dorsally angulated distal radius fx.     Sugar tong splint was applied.     Assessment/Plan:  53y Female with L distal Radius Fx.      -Pain control as needed  -DVT ppx: Per primary  -NWB LUE  -No acute orthopedic surgical intervention  -Will discuss with attending, and advise if plan changes   53y Female presents with L Distal radius Fx s/p MF. History obtained by sister at bedside. Pt has pmh of anorexia, severe malnutrition, hypothyroid, OP, hx of multiple fx, chronic hypoNA, FTT, presented to admitted to [facility] for weakness and hypoglycemia, yesterday had an RRT on the floor after pt was found hypoglycemic w/ an FS of 16 and thelma-arrest. Pt was emergently intubated given dextrose and admitted to the MICU for acute hypoxic resp failure, anorexia w/ severe malnutrition, pancytopenia, hypoK/na and hypoglycemia.  Pt had fallen about a few days before admission, she fell at PT injuring her L wrist. XR revealed a L Distal radius fx and she was splinted. She took off the splint. She fell again, which led to this admission she had a subsequent XR in the ICU which displayed a L Distal radius.       Vital Signs Last 24 Hrs  T(C): 36.1 (29 Aug 2023 12:03), Max: 37.2 (28 Aug 2023 20:15)  T(F): 97 (29 Aug 2023 12:03), Max: 99 (28 Aug 2023 20:15)  HR: 78 (29 Aug 2023 12:25) (66 - 95)  BP: 111/80 (29 Aug 2023 10:30) (83/62 - 132/92)  BP(mean): 92 (29 Aug 2023 10:30) (68 - 108)  RR: 24 (29 Aug 2023 11:00) (14 - 45)  SpO2: 100% (29 Aug 2023 12:25) (91% - 100%)    Parameters below as of 29 Aug 2023 07:30  Patient On (Oxygen Delivery Method): ventilator, AC16,300,5    O2 Concentration (%): 30    PHYSICAL EXAM  General: Intubated and sedated. Cachetic appearing. Reacts to touch and pain, opens eyes spontaneously     LUE  multiple skin tears throughout proximal forearm. and elbow, no communication to bone or joint appreciated   ecchymosis noted at the wrist  Unable to assess motor function to intubation/sedation  Reacts to touch/pain throughout the entire LUE  2+ Radial  Compartments soft and compressible    Secondary: No obvious crepitus or step offs appreciated throughout the RUE, RLE, LLE; Skin tears throughout the body.    IMAGING:  XR : XR L wrist, dorsally angulated distal radius fx.     Procedure:  Risks and benefits for the procedure were explained to the sister, who agreed to proceed with the splint. The extremity was wrapped with Webril, with care to pad the bony prominences over the olecranon and medial and lateral epicondyles. A plaster splint was applied; wrapped with Webril and an ace wrap; and molded until hardened. Care was taken to avoid sharp edges and to protect the skin. The extremity was elevated on pillows and ice was applied. Neurovascular exam was unchanged after the procedure. Post reduction films were ordered and demonstrated adequate reduction of the fracture.      Assessment/Plan:  53y Female with L distal Radius Fx.      -Pain control as needed  -DVT ppx: Per primary  -NWB LUE in splint  -Keep splint c/d/i  -No acute orthopedic surgical interventions indicated at this time  -Medical recommendations appreciated  -Follow up with Dr. Chris Jenkins in office in 2 weeks; if patient is still admitted then can repeat xrays of left wrist in 2 weeks   -Discussed above with Dr. Jenkins, who agrees with plan

## 2023-08-29 NOTE — PROGRESS NOTE ADULT - ASSESSMENT
Pt is a 52 y/o F pmhx of anemia, osteoporosis, depression, anxiety, chronic hyponatremia, w/ multiple admissions for malnutrition failure to thrive presents to [facility] ED w/ complaints of weakness and hypoglycemia.  RRT called for bradycardia/hypoglycemia, currently admitted to MICU    1. Acute hypoxic respiratory failure   2. Bradycardia   3. Hypoglycemia   4. Malnutrition   5. Transaminitis   6. Hypocalcemia   7. Anemia   8. Shock     Plan:     Neuro:   Intubated on vent, CXR confirmed tube placement, ABG shows improved oxygenation status   Currently trial weaning off propofol as tolerated to assess mentation    CV:   Shock state, s/p Atropine for bradycardia   No pressors currently, goal MAP >65  Elevated troponins, likely in setting of shock state  Bedside echo: limited, grossly nml function  monitoring for signs of bradycardia and will use atropine if needed.     Pulm:   Acute hypoxic respiratory failure, intubated for airway protection,   will use Low TV ventilation using 4-6cc/kg of IBW for lung protective strategies, will titrate FiO2 to maintain SpO2>92%, PEEP to facilitate ventilation.   ET tube confirmed @ candie on Xray. ABG improved, maintaining FiO2 at 30%.     GI: Diet:   Elevated LFTs, trending down, likely due to shock state  Hypoalbuminia in setting of chronic malnutrition  NG tube placed, starting tube feeds today @ 10 /hr  Monitor BMP, Mg, Ph q6  Elevated bili, RUQ u/s limited, no cholelithiasis  D/c ppi with starting tube feeds    Renal:  Elevated BUN  Maintenance LR + D5 + KCl 20 mEq at 70 cc/hr  Repeat labs  continue to monitor and avoid nephrotoxic meds.     Endo:   Hypoglycemia likely secondary to malnutrition,   s/p 2 amps of D10 and 1 amp of D5  Q1H FS   Hypocalcemia, hyperparathyroidism, will not supplement Ca while starting tube feeds to avoid refeeding syndrome  D/c levothyroxine in setting of nml T3 and T4  Thiamine 200 qd started with feeds    MSK:   8/24 L distal radius fx: comminuted, relatively non-displaced   F/u ortho    Heme: Anemia, s/p 1 unit RBC tranfusion, Heparin DVT ppx, transfuse for Hb<7    ID: No active issues, continue to monitor and trend markers of infection daily.

## 2023-08-29 NOTE — PROGRESS NOTE ADULT - ASSESSMENT
MYRNA  Hyponatremia  Hypokalemia  Hypothyroidism  FTT  Anemia   Acute respiratory failure  Bradycardia     -MYRNA likely from hypoglycemic event, improving  -Suspect low solute in take, tea and toast physiology  -Check urine lytes - pending   -Check Ur Osm  -PRBCs per primary  -TSH elevated, check T4, could also cause hyponatremia  -Deemed not to have capacity by psych  -Sodium improving  -ON D5-LR  -Monitor for refeeding when using GI tract  -Hyperchloremic acidosis, mild       d/w ICU

## 2023-08-30 NOTE — SWALLOW BEDSIDE ASSESSMENT ADULT - SWALLOW EVAL: RECOMMENDED DIET
1. PO is contraindicated at this time 2. MD to consider a ST non-oral method of nutrition/hydration/medication

## 2023-08-30 NOTE — PROGRESS NOTE ADULT - ASSESSMENT
54 y/o female with pmhx of anorexia with severe malnutrition, hypothyroidism, osteoporosis with multiple fractures admitted on 8/27 with failure to thrive with bradycardia and hypoxia required intubation on 8/28. extubated 8/29    1. acute hypoxic respiratory failure  2. failure to thrive/severe protein malnourishment  3. hypophosphatemia     - high risk for reintubuation. poor candidate for NIV. will start on HFNC 100% /50 lpm  - attempted to place NGT multiple times. kept coiling in mouth  - monitor off abx  - q6 hour labs for refeeding syndrome

## 2023-08-30 NOTE — SWALLOW BEDSIDE ASSESSMENT ADULT - COMMENTS
Are hemoglobin results in from this morning?      2nd contact phone number   Best time to call them back - anytime    IF CALLING AFTER 4PM - Please advise parent they will receive a call tomorrow.     Parent agreed? [] Yes [] No     
H&P: 54 y/o female with pmhx of anorexia with severe malnutrition, hypothyroidism, osteoporosis with multiple fractures admitted on 8/27 with failure to thrive with bradycardia and hypoxia required intubation on 8/28. extubated 8/29    Of note: high risk for reintubuation. poor candidate for NIV. will start on HFNC 100% /50 lpm attempted to place NGT multiple times. kept coiling in mouth    CT Chest 8/29 areas of hyper aeration on the right    Pt seen at bedside for speech and swallow evaluation. Pt currenlty receiving wound care. SLP to revisit at a later time today as schedule permits. D/w MD
H&P: 52 y/o female with pmhx of anorexia with severe malnutrition, hypothyroidism, osteoporosis with multiple fractures admitted on 8/27 with failure to thrive with bradycardia and hypoxia required intubation on 8/28. extubated 8/29    Of note: high risk for reintubuation. poor candidate for NIV. will start on HFNC 100% /50 lpm attempted to place NGT multiple times. kept coiling in mouth    CT Chest 8/29 areas of hyper aeration on the right    Pt received laying semi-upright in bed, lethargic but able to follow commands, no verbal output. Pts sister was present at bedside.

## 2023-08-30 NOTE — SWALLOW BEDSIDE ASSESSMENT ADULT - SWALLOW EVAL: DIAGNOSIS
Pt was offered ice chips and coated spoon and noted with lip pursing, shaking head and verbalizing no toward presentations. Pt with refusal despite persistent reinforcement on part of SLP to participate. Pt also p/w significant lethargy. Given refusal to participate, unable to assess oropharyngeal swallow function. Please reconsult this dept. pending changes in medical status that would support PO provision. D/w MD at bedside.

## 2023-08-30 NOTE — PROGRESS NOTE ADULT - SUBJECTIVE AND OBJECTIVE BOX
Patient is a 53y old  Female who presents with a chief complaint of fall, pancytopenia (27 Aug 2023 17:14)       HPI:  52yo F with PMH anemia, hypothyroidism, osteoporosis, chronic hyponatremia, depression, anxiety presents to ED on 8/27/23 due to weakness and hypoglycemia.  Patient was seen at this emergency room 8/24 ago for a fall at physical therapy 8/23.  Was diagnosed with left wrist fracture at that time.  Splint was applied and seen by orthopedics.  Recommended admission due to fall risk and unsafe discharge.  Patient refused admission and left AMA.  Patient was fully aware of  fall risk and all lab abnormalities.  States she wanted to follow-up outpatient.  Today patient became weak and aide assisted her down to the floor.  Denies hitting head or any injuries from the fall because aide assisted her.  EMS was called.  Sugar on arrival per EMS was 48 and given D10.  Sugar on arrival to emergency room was 171.  Patient denies any pain or complaints.  Denies any injuries from the fall.  At this time patient is refusing any treatment or test.  States she just wants to go home and eat.  States her sugar was low because she did not eat today but she says she will eat when she gets home. Patient removed splint that was placed 2 days ago and was not wearing splint today per EMS. Patient's sister reports that she has been getting worse for the last 6 months and has become increasingly more frail. Patient lives alone with aides as per sister. Patient has ritualistic eating habits and a diet which includes eggs, fish, and vegetables. She currently takes no medications.   Renal consulted for multiple electrolyte abnormalities.  She states she wants to urinate.  Denies any N/V/SOB.  Eating. States she had edema.          PAST MEDICAL & SURGICAL HISTORY:  Anemia      Depression      Chronic musculoskeletal pain      Anorexia  at age 15 yrs      No significant past surgical history           FAMILY HISTORY:  No pertinent family history in first degree relatives    [place]    Social History:Non smoker    MEDICATIONS  (STANDING):  dextrose 50% Injectable 25 Gram(s) IV Push once  dextrose 50% Injectable 12.5 Gram(s) IV Push once  dextrose 50% Injectable 25 Gram(s) IV Push once  dextrose Oral Gel 15 Gram(s) Oral once  glucagon  Injectable 1 milliGRAM(s) IntraMuscular once  levothyroxine 25 MICROGram(s) Oral daily  potassium chloride    Tablet ER 40 milliEquivalent(s) Oral once    MEDICATIONS  (PRN):  melatonin 3 milliGRAM(s) Oral at bedtime PRN Insomnia   Meds reviewed    Allergies    No Known Allergies    Intolerances         REVIEW OF SYSTEMS:    Review of Systems:   Constitutional: weakness  HEENT: Denies headaches and dizziness  Respiratory: denies SOB, cough, or wheezing  Cardiovascular: denies CP, palpitations  Gastrointestinal: Denies nausea, denies vomiting, diarrhea, constipation, abdominal pain, or bloody stools  Genitourinary: denies painful urination, increased frequency, urgency, or bloody urine  Skin: denies rashes or itching  Musculoskeletal: denies muscle aches, joint swelling  Neurologic: Denies generalized weakness, denies loss of sensation, numbness, or tingling      Vital Signs Last 24 Hrs  T(C): 36.1 (30 Aug 2023 08:17), Max: 36.9 (29 Aug 2023 13:00)  T(F): 97 (30 Aug 2023 08:17), Max: 98.4 (29 Aug 2023 13:00)  HR: 74 (30 Aug 2023 07:00) (68 - 112)  BP: 119/77 (30 Aug 2023 07:00) (90/50 - 145/96)  BP(mean): 92 (30 Aug 2023 07:00) (64 - 117)  RR: 10 (30 Aug 2023 07:00) (10 - 31)  SpO2: 98% (30 Aug 2023 07:00) (87% - 100%)    Parameters below as of 29 Aug 2023 20:00  Patient On (Oxygen Delivery Method): mask, Venturi  O2 Flow (L/min): 15  O2 Concentration (%): 50    PHYSICAL EXAM:    GENERAL: cachectic, fraill appearing  HEAD:  Atraumatic, Normocephalic  EYES: EOMI, conjunctiva and sclera clear  ENMT: No Drainage from nares, No drainage from ears  NECK: Supple, neck  veins full  NERVOUS SYSTEM:  Awake and Alert  CHEST/LUNG: Clear to percussion bilaterally; No rales, rhonchi, wheezing, or rubs  HEART: Regular rate and rhythm; No murmurs, rubs, or gallops  ABDOMEN: Soft, Nontender, Nondistended; Bowel sounds present  EXTREMITIES:  No Edema  SKIN: + Ecchoymosis LE      LABS:                        10.4   3.38  )-----------( 64       ( 30 Aug 2023 06:00 )             29.3     08-30    145  |  113<H>  |  26<H>  ----------------------------<  82  4.2   |  25  |  0.38<L>    Ca    7.1<L>      30 Aug 2023 06:00  Phos  3.7     08-30  Mg     1.8     08-30    TPro  5.3<L>  /  Alb  2.9<L>  /  TBili  1.8<H>  /  DBili  x   /  AST  427<H>  /  ALT  363<H>  /  AlkPhos  81  08-30      Urinalysis Basic - ( 30 Aug 2023 06:00 )    Color: x / Appearance: x / SG: x / pH: x  Gluc: 82 mg/dL / Ketone: x  / Bili: x / Urobili: x   Blood: x / Protein: x / Nitrite: x   Leuk Esterase: x / RBC: x / WBC x   Sq Epi: x / Non Sq Epi: x / Bacteria: x        ABG - ( 29 Aug 2023 22:08 )  pH, Arterial: 7.54  pH, Blood: x     /  pCO2: 25    /  pO2: 155   / HCO3: 21    / Base Excess: -1.1  /  SaO2: 98.3

## 2023-08-30 NOTE — DISCHARGE NOTE PROVIDER - DETAILS OF MALNUTRITION DIAGNOSIS/DIAGNOSES
This patient has been assessed with a concern for Malnutrition and was treated during this hospitalization for the following Nutrition diagnosis/diagnoses:     -  08/28/2023: Severe protein-calorie malnutrition   -  08/28/2023: Underweight (BMI < 19)

## 2023-08-30 NOTE — PROGRESS NOTE ADULT - ASSESSMENT
MYRNA  Hyponatremia  Hypokalemia  Hypothyroidism  FTT  Anemia   Acute respiratory failure  Bradycardia     -MYRNA likely from hypoglycemic event, improving  -Suspect low solute in take, tea and toast physiology  -Check urine lytes - pending   -PRBCs per primary  -TSH elevated, check T4, could also cause hyponatremia  -Deemed not to have capacity by psych  -Sodium improving  -DC IVF. On TF  -Monitor for refeeding when using GI tract  -Hyperchloremic acidosis, mild       d/w ICU

## 2023-08-30 NOTE — DISCHARGE NOTE PROVIDER - HOSPITAL COURSE
ADMISSION DATE:  08-27-23    ---  FROM ADMISSION H+P:   HPI:  54yo F with PMH anemia, hypothyroidism, osteoporosis, chronic hyponatremia, depression, anxiety presents to ED on 8/27/23 due to weakness and hypoglycemia.  Patient was seen at this emergency room 8/24 ago for a fall at physical therapy 8/23.  Was diagnosed with left wrist fracture at that time.  Splint was applied and seen by orthopedics.  Recommended admission due to fall risk and unsafe discharge.  Patient refused admission and left AMA.  Patient was fully aware of  fall risk and all lab abnormalities.  States she wanted to follow-up outpatient.  Today patient became weak and aide assisted her down to the floor.  Denies hitting head or any injuries from the fall because aide assisted her.  EMS was called.  Sugar on arrival per EMS was 48 and given D10.  Sugar on arrival to emergency room was 171.  Patient denies any pain or complaints.  Denies any injuries from the fall.  At this time patient is refusing any treatment or test.  States she just wants to go home and eat.  States her sugar was low because she did not eat today but she says she will eat when she gets home. Patient removed splint that was placed 2 days ago and was not wearing splint today per EMS. Patient's sister reports that she has been getting worse for the last 6 months and has become increasingly more frail. Patient lives alone with aides as per sister. Patient has ritualistic eating habits and a diet which includes eggs, fish, and vegetables. She currently takes no medications.       Denies fever, chills, chest pain, palpitations, SOB, cough, abdominal pain, nausea, vomiting, diarrhea, constipation, urinary frequency, urgency, or dysuria, headaches, changes in vision, dizziness, numbness, tingling.  Denies recent travel, recent antibiotic use, or sick contacts.    ED Course:   Vitals: BP: , HR: , Temp: , RR: , SpO2: % on   Labs:  WBC 2.5, Hbg 7.0, plt 123, Na 129, K 2.7, BUN 55, Glu 209, Ca 7.2, ast 142, ALT 90, TSH 11.4  Urine/Blood tox: negative  UA: trace LE, squamous epithelial present, 100 glucose  CXR: unremarkable as per personal read, ofiical read states simialr to recent CXR on 8/24  Xray wrist:  There is a comminuted large horizontal fracture of the distal radius possibly with extension to the inner endplate. There is mild dorsal indentation distal fracture.  Received 40mEq KCl PO x1, 10mEq KCl x3 in the ED    (27 Aug 2023 17:14)      ---  HOSPITAL COURSE/PERTINENT LABS/PROCEDURES PERFORMED/PENDING TESTS:  RRT called for bradycardia, hypoglycemia  Patient given atropine and D50.  Patient intubated and sedated on propofol    Transferred to MICU  Vent settings on admission:  AC/ CMV (Assist Control/ Continuous Mandatory Ventilation), RR (machine): 16, TV (machine): 300, FiO2: 30, PEEP: 5  Weaned off ventilator support on 08/29.    OG tube placed on 08/28, feeds started on 08/29, FS monitored, no insulin given, electrolyte optimization prior to feeds.    Ortho placed splint on 08/29 for radial fracture    The patient was seen by physical therapy who recommended ***. The patient was seen and examined on the day of discharge. The patient is medically optimized for discharge to ***.    ---  PATIENT CONDITION:  - stable    ---  PHYSICAL EXAM ON DAY OF DISCHARGE:    ---  CONSULTANTS:     ---  ADVANCED CARE PLANNING:  - Code status:      - MOLST completed:      [  ] NO     [  ] YES    ---  TIME SPENT:  I, the attending physician, was physically present for the key portions of the evaluation and management (E/M) service provided. The total amount of time spent reviewing the hospital notes, laboratory values, imaging findings, assessing/counseling the patient, discussing with consultant physicians, social work, nursing staff was -- minutes

## 2023-08-30 NOTE — CONSULT NOTE ADULT - SUBJECTIVE AND OBJECTIVE BOX
HPI:  53y year old Female seen at [facility] ICU1 16A for right and left lower leg wounds down to skin, subcutaneous tissue, fat. Patient is currently bedbound and nonverbal at this time. Denies any fever, chills, nausea, vomiting, chest pain, shortness of breath, or calf pain at this time.    PAST MEDICAL & SURGICAL HISTORY:  Anemia      Depression      Chronic musculoskeletal pain      Anorexia  at age 15 yrs      No significant past surgical history          Allergies    No Known Allergies    Intolerances        MEDICATIONS  (STANDING):  chlorhexidine 2% Cloths 1 Application(s) Topical <User Schedule>  dextrose 5% + lactated ringers 1000 milliLiter(s) (75 mL/Hr) IV Continuous <Continuous>  dextrose 50% Injectable 25 Gram(s) IV Push once  dextrose 50% Injectable 12.5 Gram(s) IV Push once  dextrose 50% Injectable 25 Gram(s) IV Push once  dextrose Oral Gel 15 Gram(s) Oral once  famotidine Injectable 20 milliGRAM(s) IV Push two times a day  glucagon  Injectable 1 milliGRAM(s) IntraMuscular once  heparin   Injectable 5000 Unit(s) SubCutaneous every 12 hours  multivitamin/minerals/iron Oral Solution (CENTRUM) 15 milliLiter(s) Oral daily  thiamine IVPB 200 milliGRAM(s) IV Intermittent daily    MEDICATIONS  (PRN):      Social History:  Tobacco Usage:  unable to obtain  Alcohol Usage: unable to obtain  Substance Use:  narcotics in the past  Lives with: lives alone, has aides intermittently  ADLs: need assistance using restroom and with ADLS (27 Aug 2023 17:14)      FAMILY HISTORY:  No pertinent family history in first degree relatives        Vital Signs Last 24 Hrs  T(C): 35.8 (30 Aug 2023 11:42), Max: 36.9 (29 Aug 2023 13:00)  T(F): 96.5 (30 Aug 2023 11:42), Max: 98.4 (29 Aug 2023 13:00)  HR: 83 (30 Aug 2023 11:00) (74 - 112)  BP: 114/73 (30 Aug 2023 11:00) (90/50 - 145/96)  BP(mean): 89 (30 Aug 2023 11:00) (64 - 117)  RR: 13 (30 Aug 2023 11:00) (10 - 31)  SpO2: 97% (30 Aug 2023 11:00) (87% - 100%)    Parameters below as of 29 Aug 2023 20:00  Patient On (Oxygen Delivery Method): mask, Venturi  O2 Flow (L/min): 15  O2 Concentration (%): 50    PHYSICAL EXAM:  Vascular: DP & PT faintly palpable bilaterally, Capillary refill 3 seconds, Digital hair not present bilaterally  Neurological: Responding to painful stimuli  Musculoskeletal: Unable to assess  Dermatological: Right and left lower leg wound down to skin, subcutaneous tissue, and fat, no periwound erythema, no malodor, no fluctuance, no proximal streaking, no purulence                          10.4   3.38  )-----------( 64       ( 30 Aug 2023 06:00 )             29.3       08-30    145  |  113<H>  |  26<H>  ----------------------------<  82  4.2   |  25  |  0.38<L>    Ca    7.1<L>      30 Aug 2023 06:00  Phos  3.7     08-30  Mg     1.8     08-30    TPro  5.3<L>  /  Alb  2.9<L>  /  TBili  1.8<H>  /  DBili  x   /  AST  427<H>  /  ALT  363<H>  /  AlkPhos  81  08-30            Culture - Urine (collected 27 Aug 2023 13:20)  Source: Clean Catch Clean Catch (Midstream)  Final Report (28 Aug 2023 18:35):    <10,000 CFU/mL Normal Urogenital Micki

## 2023-08-30 NOTE — SOCIAL WORK PROGRESS NOTE - NSSWPROGRESSNOTE_GEN_ALL_CORE
SW consulted for "Recent fall and broken wrist, deconditioning, malnutrition. psych history." Pt was extubated on 8/29/23. SW awaiting PT/medical dc recommendations and will continue to follow.

## 2023-08-30 NOTE — PROGRESS NOTE ADULT - SUBJECTIVE AND OBJECTIVE BOX
INTERVAL HPI/OVERNIGHT EVENTS:  Patient seen and examined at bedside. Patient extubated yesterday, now on nasal cannula. Patient very somnolent during encounter. Opens eyes to verbal stimuli, but does not answer questions or follow commands. Unable to obtain comprehensive HPI and ROS 2/2 mental status    MEDICATIONS  (STANDING):  chlorhexidine 2% Cloths 1 Application(s) Topical <User Schedule>  dextrose 5% + lactated ringers 1000 milliLiter(s) (75 mL/Hr) IV Continuous <Continuous>  dextrose 50% Injectable 25 Gram(s) IV Push once  dextrose 50% Injectable 12.5 Gram(s) IV Push once  dextrose 50% Injectable 25 Gram(s) IV Push once  dextrose Oral Gel 15 Gram(s) Oral once  famotidine Injectable 20 milliGRAM(s) IV Push two times a day  glucagon  Injectable 1 milliGRAM(s) IntraMuscular once  heparin   Injectable 5000 Unit(s) SubCutaneous every 12 hours  multivitamin/minerals/iron Oral Solution (CENTRUM) 15 milliLiter(s) Oral daily  thiamine IVPB 200 milliGRAM(s) IV Intermittent daily    MEDICATIONS  (PRN):      Allergies    No Known Allergies    Intolerances      Vital Signs Last 24 Hrs  T(C): 36.6 (30 Aug 2023 15:58), Max: 36.9 (30 Aug 2023 15:00)  T(F): 97.9 (30 Aug 2023 15:58), Max: 98.4 (30 Aug 2023 15:00)  HR: 75 (30 Aug 2023 18:00) (64 - 97)  BP: 116/76 (30 Aug 2023 18:00) (99/68 - 145/96)  BP(mean): 92 (30 Aug 2023 18:00) (80 - 115)  RR: 10 (30 Aug 2023 18:00) (8 - 27)  SpO2: 100% (30 Aug 2023 18:00) (87% - 100%)    Parameters below as of 29 Aug 2023 20:00  Patient On (Oxygen Delivery Method): mask, Venturi  O2 Flow (L/min): 15  O2 Concentration (%): 50    08-29 @ 07:01  -  08-30 @ 07:00  --------------------------------------------------------  IN: 2227 mL / OUT: 2090 mL / NET: 137 mL    08-30 @ 07:01  -  08-30 @ 19:22  --------------------------------------------------------  IN: 1052 mL / OUT: 385 mL / NET: 667 mL      Physical Exam:  General: frail, cachectic, ill appearing  Neurology: opens eyes to verbal stimuli, does not follow commands  Respiratory: decreased breath sounds B/L bases  CV: RRR, S1S2  Abdominal: Soft, NT, ND +BS  Extremities: +splint LUE, multiple bandages all 4 extremities, + peripheral pulses      LABS:                        10.4   3.38  )-----------( 64       ( 30 Aug 2023 06:00 )             29.3     08-30    145  |  113<H>  |  26<H>  ----------------------------<  82  4.2   |  25  |  0.38<L>    Ca    7.1<L>      30 Aug 2023 06:00  Phos  3.7     08-30  Mg     1.8     08-30    TPro  5.3<L>  /  Alb  2.9<L>  /  TBili  1.8<H>  /  DBili  x   /  AST  427<H>  /  ALT  363<H>  /  AlkPhos  81  08-30      Urinalysis Basic - ( 30 Aug 2023 06:00 )    Color: x / Appearance: x / SG: x / pH: x  Gluc: 82 mg/dL / Ketone: x  / Bili: x / Urobili: x   Blood: x / Protein: x / Nitrite: x   Leuk Esterase: x / RBC: x / WBC x   Sq Epi: x / Non Sq Epi: x / Bacteria: x        RADIOLOGY & ADDITIONAL TESTS:

## 2023-08-30 NOTE — PROGRESS NOTE ADULT - ASSESSMENT
Pt is a 52 y/o F pmhx of anemia, osteoporosis, depression, anxiety, chronic hyponatremia, w/ multiple admissions for malnutrition failure to thrive presents to [facility] ED w/ complaints of weakness and hypoglycemia.  RRT called for bradycardia/hypoglycemia, currently admitted to MICU    1. Acute hypoxic respiratory failure   2. Bradycardia   3. Hypoglycemia   4. Malnutrition   5. Transaminitis   6. Hypocalcemia   7. Anemia   8. Shock     Plan:     Neuro:  Off sedation medication  Responding to verbal stimuli, unable to respond to command  Continue to monitor    CV:   Shock state, s/p Atropine for bradycardia   No pressors currently, goal MAP >65  Elevated troponins, likely in setting of shock state  Bedside echo: limited, grossly nml function  monitoring for signs of bradycardia and will use atropine if needed.     Pulm:   Acute hypoxic respiratory failure, currently extubated, statting well on NC  will titrate FiO2 to maintain SpO2>92%,    GI:   Diet:   Elevated LFTs, trending down, likely due to shock state  Hypoalbuminia in setting of chronic malnutrition  NG tube to be placed, plan for tube feed restart  Monitor BMP, Mg, Ph q6  Elevated bili, RUQ u/s limited, no cholelithiasis  PI dose due to no current feeds  Speech and swallow eval    Renal:  Elevated BUN  Maintenance LR + D5 + KCl 20 mEq at 75 cc/hr  Repeat labs  continue to monitor and avoid nephrotoxic meds.     Endo:   Hypoglycemia likely secondary to malnutrition,   s/p 2 amps of D10 and 1 amp of D5  Q3H FS   Hypocalcemia, hyperparathyroidism, will not supplement Ca while starting tube feeds to avoid refeeding syndrome  D/c levothyroxine in setting of nml T3 and T4  Thiamine 200 qd     MSK:   8/24 L distal radius fx: comminuted, relatively non-displaced   Seen by ortho, splint placed    Heme: Anemia, s/p 1 unit RBC tranfusion, Heparin DVT ppx, transfuse for Hb<7    ID: No active issues, continue to monitor and trend markers of infection daily.

## 2023-08-30 NOTE — CONSULT NOTE ADULT - PROBLEM SELECTOR RECOMMENDATION 9
Patient examined and evaluated at this time. Continue local wound care and offloading at this time. Dressing changes as per nursing dressing change order. No surgical intervention planned at this time.

## 2023-08-30 NOTE — PROGRESS NOTE ADULT - SUBJECTIVE AND OBJECTIVE BOX
Patient is a 53y old  Female who presents with a chief complaint of fall, pancytopenia (29 Aug 2023 14:07)      BRIEF HOSPITAL COURSE: 52 y/o female with pmhx of anorexia with severe malnutrition, hypothyroidism, osteoporosis with multiple fractures admitted on 8/27 with failure to thrive with bradycardia and hypoxia required intubation on 8/28. extubated 8/29    Events last 24 hours: phos repleted. respiratory status remains very tenuous post extubation. escalated to NRB. ABG with po2 of 155    PAST MEDICAL & SURGICAL HISTORY:  Anemia      Depression      Chronic musculoskeletal pain      Anorexia  at age 15 yrs      No significant past surgical history          Review of Systems:  unable to obtain due to AMS    Medications:            heparin   Injectable 5000 Unit(s) SubCutaneous every 12 hours        dextrose 50% Injectable 25 Gram(s) IV Push once  dextrose 50% Injectable 12.5 Gram(s) IV Push once  dextrose 50% Injectable 25 Gram(s) IV Push once  dextrose Oral Gel 15 Gram(s) Oral once  glucagon  Injectable 1 milliGRAM(s) IntraMuscular once    dextrose 5% + lactated ringers 1000 milliLiter(s) IV Continuous <Continuous>  multivitamin/minerals/iron Oral Solution (CENTRUM) 15 milliLiter(s) Oral daily  thiamine IVPB 200 milliGRAM(s) IV Intermittent daily      chlorhexidine 2% Cloths 1 Application(s) Topical <User Schedule>        Mode: standby      ICU Vital Signs Last 24 Hrs  T(C): 36.8 (30 Aug 2023 00:00), Max: 36.9 (29 Aug 2023 13:00)  T(F): 98.2 (30 Aug 2023 00:00), Max: 98.4 (29 Aug 2023 13:00)  HR: 95 (30 Aug 2023 03:00) (68 - 112)  BP: 145/96 (30 Aug 2023 03:00) (83/62 - 145/96)  BP(mean): 115 (30 Aug 2023 03:00) (64 - 117)  ABP: --  ABP(mean): --  RR: 18 (30 Aug 2023 03:00) (12 - 31)  SpO2: 99% (30 Aug 2023 03:00) (87% - 100%)    O2 Parameters below as of 29 Aug 2023 20:00  Patient On (Oxygen Delivery Method): mask, Venturi  O2 Flow (L/min): 15  O2 Concentration (%): 50        ABG - ( 29 Aug 2023 22:08 )  pH, Arterial: 7.54  pH, Blood: x     /  pCO2: 25    /  pO2: 155   / HCO3: 21    / Base Excess: -1.1  /  SaO2: 98.3                I&O's Detail    28 Aug 2023 07:01  -  29 Aug 2023 07:00  --------------------------------------------------------  IN:    IV PiggyBack: 100 mL    IV PiggyBack: 300 mL    IV PiggyBack: 250 mL    IV PiggyBack: 300 mL    Lactated Ringers: 1400 mL    Norepinephrine: 2.5 mL    Propofol: 54.9 mL  Total IN: 2407.4 mL    OUT:    Intermittent Catheterization - Urethral (mL): 3110 mL  Total OUT: 3110 mL    Total NET: -702.6 mL      29 Aug 2023 07:01  -  30 Aug 2023 04:01  --------------------------------------------------------  IN:    dextrose 5% + lactated ringers w/ Additives: 70 mL    dextrose 5% + lactated ringers w/ Additives: 350 mL    dextrose 5% + lactated ringers w/ Additives: 600 mL    Free Water: 70 mL    IV PiggyBack: 102 mL    IV PiggyBack: 250 mL    Lactated Ringers w/ Additives: 490 mL    Vital1.5: 70 mL  Total IN: 2002 mL    OUT:    Intermittent Catheterization - Urethral (mL): 1280 mL    Propofol: 0 mL    Voided (mL): 550 mL  Total OUT: 1830 mL    Total NET: 172 mL            LABS:                        9.8    3.24  )-----------( 90       ( 29 Aug 2023 05:33 )             27.1     08-29    142  |  111<H>  |  27<H>  ----------------------------<  159<H>  4.4   |  25  |  0.47<L>    Ca    7.0<L>      29 Aug 2023 20:52  Phos  2.1     08-29  Mg     1.9     08-29    TPro  4.7<L>  /  Alb  2.7<L>  /  TBili  1.6<H>  /  DBili  x   /  AST  506<H>  /  ALT  361<H>  /  AlkPhos  76  08-29      CARDIAC MARKERS ( 28 Aug 2023 07:31 )  x     / x     / 320 U/L / x     / 15.4 ng/mL      CAPILLARY BLOOD GLUCOSE  151 (29 Aug 2023 17:15)      POCT Blood Glucose.: 135 mg/dL (29 Aug 2023 23:25)    PT/INR - ( 28 Aug 2023 09:00 )   PT: 17.4 sec;   INR: 1.50 ratio         PTT - ( 28 Aug 2023 09:00 )  PTT:29.3 sec  Urinalysis Basic - ( 29 Aug 2023 20:52 )    Color: x / Appearance: x / SG: x / pH: x  Gluc: 159 mg/dL / Ketone: x  / Bili: x / Urobili: x   Blood: x / Protein: x / Nitrite: x   Leuk Esterase: x / RBC: x / WBC x   Sq Epi: x / Non Sq Epi: x / Bacteria: x      CULTURES:  Culture Results:   <10,000 CFU/mL Normal Urogenital Micki (08-27 @ 13:20)      Physical Examination:    General: No acute distress.      HEENT: Pupils equal, reactive to light.  Symmetric.    PULM: Clear to auscultation bilaterally, no significant sputum production    NECK: Supple, no lymphadenopathy, trachea midline    CVS: Regular rate and rhythm, no murmurs, rubs, or gallops    ABD: Soft, nondistended, nontender, normoactive bowel sounds, no masses    EXT: No edema, nontender    SKIN: Warm and well perfused, no rashes noted.    NEURO: Alert, oriented, interactive, nonfocal    DEVICES:     RADIOLOGY: ***    CRITICAL CARE TIME SPENT: ***    TIME SPENT: *** Patient is a 53y old  Female who presents with a chief complaint of fall, pancytopenia (29 Aug 2023 14:07)      BRIEF HOSPITAL COURSE: 52 y/o female with pmhx of anorexia with severe malnutrition, hypothyroidism, osteoporosis with multiple fractures admitted on 8/27 with failure to thrive with bradycardia and hypoxia required intubation on 8/28. extubated 8/29    Events last 24 hours: phos repleted. respiratory status remains very tenuous post extubation. escalated to NRB. ABG with po2 of 155    PAST MEDICAL & SURGICAL HISTORY:  Anemia      Depression      Chronic musculoskeletal pain      Anorexia  at age 15 yrs      No significant past surgical history          Review of Systems:  unable to obtain due to AMS    Medications:            heparin   Injectable 5000 Unit(s) SubCutaneous every 12 hours        dextrose 50% Injectable 25 Gram(s) IV Push once  dextrose 50% Injectable 12.5 Gram(s) IV Push once  dextrose 50% Injectable 25 Gram(s) IV Push once  dextrose Oral Gel 15 Gram(s) Oral once  glucagon  Injectable 1 milliGRAM(s) IntraMuscular once    dextrose 5% + lactated ringers 1000 milliLiter(s) IV Continuous <Continuous>  multivitamin/minerals/iron Oral Solution (CENTRUM) 15 milliLiter(s) Oral daily  thiamine IVPB 200 milliGRAM(s) IV Intermittent daily      chlorhexidine 2% Cloths 1 Application(s) Topical <User Schedule>        Mode: standby      ICU Vital Signs Last 24 Hrs  T(C): 36.8 (30 Aug 2023 00:00), Max: 36.9 (29 Aug 2023 13:00)  T(F): 98.2 (30 Aug 2023 00:00), Max: 98.4 (29 Aug 2023 13:00)  HR: 95 (30 Aug 2023 03:00) (68 - 112)  BP: 145/96 (30 Aug 2023 03:00) (83/62 - 145/96)  BP(mean): 115 (30 Aug 2023 03:00) (64 - 117)  ABP: --  ABP(mean): --  RR: 18 (30 Aug 2023 03:00) (12 - 31)  SpO2: 99% (30 Aug 2023 03:00) (87% - 100%)    O2 Parameters below as of 29 Aug 2023 20:00  Patient On (Oxygen Delivery Method): mask, Venturi  O2 Flow (L/min): 15  O2 Concentration (%): 50        ABG - ( 29 Aug 2023 22:08 )  pH, Arterial: 7.54  pH, Blood: x     /  pCO2: 25    /  pO2: 155   / HCO3: 21    / Base Excess: -1.1  /  SaO2: 98.3                I&O's Detail    28 Aug 2023 07:01  -  29 Aug 2023 07:00  --------------------------------------------------------  IN:    IV PiggyBack: 100 mL    IV PiggyBack: 300 mL    IV PiggyBack: 250 mL    IV PiggyBack: 300 mL    Lactated Ringers: 1400 mL    Norepinephrine: 2.5 mL    Propofol: 54.9 mL  Total IN: 2407.4 mL    OUT:    Intermittent Catheterization - Urethral (mL): 3110 mL  Total OUT: 3110 mL    Total NET: -702.6 mL      29 Aug 2023 07:01  -  30 Aug 2023 04:01  --------------------------------------------------------  IN:    dextrose 5% + lactated ringers w/ Additives: 70 mL    dextrose 5% + lactated ringers w/ Additives: 350 mL    dextrose 5% + lactated ringers w/ Additives: 600 mL    Free Water: 70 mL    IV PiggyBack: 102 mL    IV PiggyBack: 250 mL    Lactated Ringers w/ Additives: 490 mL    Vital1.5: 70 mL  Total IN: 2002 mL    OUT:    Intermittent Catheterization - Urethral (mL): 1280 mL    Propofol: 0 mL    Voided (mL): 550 mL  Total OUT: 1830 mL    Total NET: 172 mL            LABS:                        9.8    3.24  )-----------( 90       ( 29 Aug 2023 05:33 )             27.1     08-29    142  |  111<H>  |  27<H>  ----------------------------<  159<H>  4.4   |  25  |  0.47<L>    Ca    7.0<L>      29 Aug 2023 20:52  Phos  2.1     08-29  Mg     1.9     08-29    TPro  4.7<L>  /  Alb  2.7<L>  /  TBili  1.6<H>  /  DBili  x   /  AST  506<H>  /  ALT  361<H>  /  AlkPhos  76  08-29      CARDIAC MARKERS ( 28 Aug 2023 07:31 )  x     / x     / 320 U/L / x     / 15.4 ng/mL      CAPILLARY BLOOD GLUCOSE  151 (29 Aug 2023 17:15)      POCT Blood Glucose.: 135 mg/dL (29 Aug 2023 23:25)    PT/INR - ( 28 Aug 2023 09:00 )   PT: 17.4 sec;   INR: 1.50 ratio         PTT - ( 28 Aug 2023 09:00 )  PTT:29.3 sec  Urinalysis Basic - ( 29 Aug 2023 20:52 )    Color: x / Appearance: x / SG: x / pH: x  Gluc: 159 mg/dL / Ketone: x  / Bili: x / Urobili: x   Blood: x / Protein: x / Nitrite: x   Leuk Esterase: x / RBC: x / WBC x   Sq Epi: x / Non Sq Epi: x / Bacteria: x      CULTURES:  Culture Results:   <10,000 CFU/mL Normal Urogenital Micki (08-27 @ 13:20)      Physical Examination:    General: No acute distress.  severely cachectic    HEENT: Pupils equal, reactive to light.  Symmetric.    PULM: Clear to auscultation bilaterally, no significant sputum production    NECK: Supple, no lymphadenopathy, trachea midline    CVS: Regular rate and rhythm, no murmurs, rubs, or gallops    ABD: Soft, nondistended, nontender, normoactive bowel sounds, no masses    EXT: No edema, nontender    SKIN: Warm and well perfused, no rashes noted.    NEURO: Awake. follows simple commands.    RADIOLOGY: reviewed    CRITICAL CARE TIME SPENT: 34 minutes of critical care time spent providing medical care for patient's acute illness/conditions that impairs at least one vital organ system and/or poses a high risk of imminent or life threatening deterioration in the patient's condition. It includes time spent evaluating and treating the patient's acute illness as well as time spent reviewing labs, radiology, discussing goals of care with patient and/or patient's family, and discussing the case with a multidisciplinary team in an effort to prevent further life threatening deterioration or end organ damage. This time is independent of any procedures performed.

## 2023-08-30 NOTE — PROGRESS NOTE ADULT - SUBJECTIVE AND OBJECTIVE BOX
INTERVAL HPI/OVERNIGHT EVENTS: Patient extubated, difficulty weaning off respiratory support, currently on NC.  NG tube placement unable to obtain.    SUBJECTIVE: Seen and examined pt at bedside.  Patient currently off sedation medication.  Patient is arousable to voice.  Patient unable to follow commands.    Review of Systems:  Unable to obtain due to mental status    ICU Vital Signs Last 24 Hrs  T(C): 35.4 (29 Aug 2023 08:00), Max: 37.2 (28 Aug 2023 20:15)  T(F): 95.8 (29 Aug 2023 08:00), Max: 99 (28 Aug 2023 20:15)  HR: 71 (29 Aug 2023 11:00) (66 - 95)  BP: 111/80 (29 Aug 2023 10:30) (83/62 - 132/92)  BP(mean): 92 (29 Aug 2023 10:30) (68 - 108)  ABP: --  ABP(mean): --  RR: 24 (29 Aug 2023 11:00) (14 - 46)  SpO2: 100% (29 Aug 2023 11:00) (91% - 100%)    O2 Parameters below as of 29 Aug 2023 07:30  Patient On (Oxygen Delivery Method): ventilator, AC16,300,5    O2 Concentration (%): 30        Mode: AC/ CMV (Assist Control/ Continuous Mandatory Ventilation), RR (machine): 16, TV (machine): 300, FiO2: 30, PEEP: 5  08-28-23 @ 07:01  -  08-29-23 @ 07:00  --------------------------------------------------------  IN: 2407.4 mL / OUT: 3110 mL / NET: -702.6 mL        CAPILLARY BLOOD GLUCOSE  126 (29 Aug 2023 11:00)      POCT Blood Glucose.: 126 mg/dL (29 Aug 2023 10:59)      I&O's Summary    28 Aug 2023 07:01  -  29 Aug 2023 07:00  --------------------------------------------------------  IN: 2407.4 mL / OUT: 3110 mL / NET: -702.6 mL        PHYSICAL EXAM:  Gen: NAD, poor mental status, extremely emaciated 2/2 malnourishment  Neuro: opens eyes in response to verbal stimuli  HEENT: NC/AT, PERRLA, EOMI  Resp: CTA b/l, no w/r/r  Cardio: RRR, s1/s2, no m/r/g  Abd: soft, NT, ND, bowels sounds present in all 4 quadrants, no palpable masses  Ext:  no LLE edema, cyanosis, clubbing, wrist splint currently in place  Skin: multiple bandages in place on 4 extremities    Meds:      dextrose 50% Injectable IV Push  dextrose 50% Injectable IV Push  dextrose 50% Injectable IV Push  dextrose Oral Gel Oral  glucagon  Injectable IntraMuscular      propofol Infusion IV Continuous      heparin   Injectable SubCutaneous    famotidine Injectable IV Push      dextrose 5% + lactated ringers IV Continuous  multivitamin/minerals/iron Oral Solution (CENTRUM) Oral  thiamine IVPB IV Intermittent      chlorhexidine 0.12% Liquid Oral Mucosa  chlorhexidine 2% Cloths Topical                              9.8    3.24  )-----------( 90       ( 29 Aug 2023 05:33 )             27.1       08-29    140  |  114<H>  |  28<H>  ----------------------------<  117<H>  3.9   |  19<L>  |  0.41<L>    Ca    7.1<L>      29 Aug 2023 05:33  Phos  4.0     08-29  Mg     1.8     08-29    TPro  4.7<L>  /  Alb  2.7<L>  /  TBili  1.6<H>  /  DBili  x   /  AST  506<H>  /  ALT  361<H>  /  AlkPhos  76  08-29      CARDIAC MARKERS ( 28 Aug 2023 07:31 )  x     / x     / 320 U/L / x     / 15.4 ng/mL      PT/INR - ( 28 Aug 2023 09:00 )   PT: 17.4 sec;   INR: 1.50 ratio         PTT - ( 28 Aug 2023 09:00 )  PTT:29.3 sec  Urinalysis Basic - ( 29 Aug 2023 05:33 )    Color: x / Appearance: x / SG: x / pH: x  Gluc: 117 mg/dL / Ketone: x  / Bili: x / Urobili: x   Blood: x / Protein: x / Nitrite: x   Leuk Esterase: x / RBC: x / WBC x   Sq Epi: x / Non Sq Epi: x / Bacteria: x      Clean Catch Clean Catch (Midstream)   <10,000 CFU/mL Normal Urogenital Micki -- 08-27 @ 13:20              Radiology:   < from: Xray Chest 1 View-PORTABLE IMMEDIATE (Xray Chest 1 View-PORTABLE IMMEDIATE .) (08.29.23 @ 05:18) >  ACC: [id number] EXAM:  XR CHEST PORTABLE IMMED 1V   ORDERED BY:  ELISSA UMANA     PROCEDURE DATE:  08/29/2023          INTERPRETATION:  EXAM: XR CHEST IMMEDIATE    INDICATION: Admitting Dxs: R62.7 ADULT FAILURE TO THRIVE Shortness of   Breath PXR    COMPARISON: Earlier exam at 3:27 PM on August 28    IMPRESSION: Endotracheal tube tip above the candie. NG tube tip overlying   the matter with what appears to be some distended bowel loops. Increased   interstitial markings in the left para and infrahilar region and behind   the heart. Areas of hyperaeration especially on the right. Especially on   the right. Continued follow-up suggested    --- End of Report ---        < end of copied text >      < from: Xray Wrist 3 Views, Left (08.27.23 @ 13:44) >  ACC: [id number] EXAM:  XR CHEST AP OR PA 1V   ORDERED BY: HENRIK ENNIS     ACC: [id number] EXAM:  XR WRIST COMP MIN 3 VIEWS LT   ORDERED BY: HENRIK ENNIS     PROCEDURE DATE:  08/27/2023          INTERPRETATION:  Left wrist and chest. Patient had a fall 3 days ago.    Left wrist.    There is a comminuted large horizontal fracture of the distal radius   possibly with extension to the inner endplate.    There is mild dorsal indentation distal fracture.    Findings are similar to August 24.    IMPRESSION: Again noted is distal radial fracture as above.    --- End of Report ---    < end of copied text >      Bedside Ultrasound: n/a      Tubes/Lines: mike in place, 3 peripheral IVs      GLOBAL ISSUE/BEST PRACTICE:  Analgesia: n/a  Sedation: n/a  HOB elevation: Y  Stress ulcer prophylaxis: PPI  VTE prophylaxis: HSQ  Glycemic control: n/a  Nutrition: attempt NG tube placement    CODE STATUS: full code

## 2023-08-31 NOTE — PROGRESS NOTE ADULT - ASSESSMENT
Pt is a 54 y/o F pmhx of anemia, osteoporosis, depression, anxiety, chronic hyponatremia, w/ multiple admissions for malnutrition failure to thrive presents to [facility] ED w/ complaints of weakness and hypoglycemia.  RRT called for bradycardia/hypoglycemia, currently admitted to MICU    1. Acute hypoxic respiratory failure   2. Bradycardia   3. Hypoglycemia   4. Malnutrition   5. Transaminitis   6. Hypocalcemia   7. Anemia   8. Shock     Plan:     Neuro:  On propofol, intubated  Responding to verbal stimuli, unable to respond to command  Continue to monitor    CV:   Shock state, s/p Atropine for bradycardia   Levo started, goal MAP >65  Elevated troponins, likely in setting of shock state  Bedside echo: limited, grossly nml function  monitoring for signs of bradycardia and will use atropine if needed.     Pulm:   Acute hypoxic respiratory failure, re-intubated today 2/2 desatting  Vent settings: 16/300/60/5    GI:   Diet:   Elevated LFTs, trending down, likely due to shock state  Hypoalbuminia in setting of chronic malnutrition  NG tube placed, CXR confirmed, tube feeds held for intubation  Monitor BMP, Mg, Ph q6  Elevated bili, RUQ u/s limited, no cholelithiasis  Speech and swallow eval    Renal:  Elevated BUN   cc bolus  On LR 60 cc/hr  Repeat labs  continue to monitor and avoid nephrotoxic meds, mike in place    Endo:   Hypoglycemia likely secondary to malnutrition,   s/p 2 amps of D10 and 1 amp of D5  Q3H FS   Hypocalcemia, hyperparathyroidism, will not supplement Ca while starting tube feeds to avoid refeeding syndrome  D/c levothyroxine in setting of nml T3 and T4  Thiamine 200 qd     MSK:   8/24 L distal radius fx: comminuted, relatively non-displaced   Seen by ortho, splint placed, confirmed by xray    Heme: Anemia, s/p 1 unit RBC tranfusion, Heparin DVT ppx, transfuse for Hb<7    ID: f/u pan cx, start Vanc and Cefepime after blood draws, continue to monitor and trend markers of infection daily.    Pt is a 54 y/o F pmhx of anemia, osteoporosis, depression, anxiety, chronic hyponatremia, w/ multiple admissions for malnutrition failure to thrive presents to [facility] ED w/ complaints of weakness and hypoglycemia.  RRT called for bradycardia/hypoglycemia, currently admitted to MICU    1. Acute hypoxic respiratory failure   2. Bradycardia   3. Hypoglycemia   4. Malnutrition   5. Transaminitis   6. Hypocalcemia   7. Anemia   8. Shock     Plan:     Neuro:  On propofol, intubated  Sedated on propofol,  Continue to monitor    CV:   Shock state on admission, s/p Atropine for bradycardia   Elevated troponins, likely in setting of shock state  Bedside echo: limited, grossly nml function  Tachycardic and hypotensive, likely in setting of septic shock  On norepineprhene and vasopressin support, goal MAP  >65  500 cc LR fluid bolus, on maintenance 60 cc/hr LR      Pulm:   Acute hypoxic respiratory failure, re-intubated today 2/2 desatting  Vent settings: 16/300/5/50%  Maintain oxygen >92%  ABG ordered, evidence of mild hypercapnia  CT chest non con ordered    GI:  Diet held, NG tube in place  Elevated LFTs, likely due to shock state  Hypoalbuminia in setting of chronic malnutrition  Repeat labs in afternoon  Elevated bili, RUQ u/s limited, no cholelithiasis  Low Hgb, no evidence of gross evisceration, CT Angio Abdomen and Pelvis w/ IV cont      Renal:  Elevated BUN   cc bolus  On LR 60 cc/hr  Repeat labs  continue to monitor and avoid nephrotoxic meds, mike in place    Endo:   Hypoglycemia likely secondary to malnutrition,   s/p 2 amps of D10 and 1 amp of D5  Q3H FS   corrected Hypocalcemia, hyperparathyroidism, will not supplement Ca currently,  D/c levothyroxine in setting of nml T3 and T4  Thiamine 200 qd     MSK:   8/24 L distal radius fx: comminuted, relatively non-displaced   Seen by ortho, splint placed, confirmed by xray    Heme:   Anemia, s/p 1 unit RBC tranfusion,   Heparin DVT ppx,  Type and Cross ordered, plan for 1u pRBC today  Coags ordered    ID:   f/u blood cultures, f/u urinalysis  possible septic shock, on pressors  start Vanc and Cefepime after blood draws  hypothermic, leukocytopenia  continue to monitor and trend markers of infection daily.

## 2023-08-31 NOTE — PROGRESS NOTE ADULT - SUBJECTIVE AND OBJECTIVE BOX
INTERVAL HPI/OVERNIGHT EVENTS: Pt was weaned off NC overnight, placed back on NC this AM. Intubated this AM    SUBJECTIVE: Seen and examined pt at bedside. Pt opens eyes to name, able to squeeze fingers with R hand., when asked to speak, pt unable to.    Review of Systems:  Unable to obtain, pt intubated    ICU Vital Signs Last 24 Hrs  T(C): 34.4 (31 Aug 2023 11:56), Max: 36.9 (30 Aug 2023 15:00)  T(F): 94 (31 Aug 2023 11:56), Max: 98.4 (30 Aug 2023 15:00)  HR: 73 (31 Aug 2023 10:35) (71 - 110)  BP: 80/53 (31 Aug 2023 10:18) (80/53 - 132/87)  BP(mean): 61 (31 Aug 2023 10:18) (58 - 105)  ABP: --  ABP(mean): --  RR: 20 (31 Aug 2023 10:18) (10 - 32)  SpO2: 100% (31 Aug 2023 10:35) (92% - 100%)    O2 Parameters below as of 31 Aug 2023 05:00  Patient On (Oxygen Delivery Method): nasal cannula  O2 Flow (L/min): 4          Mode: AC/ CMV (Assist Control/ Continuous Mandatory Ventilation), RR (machine): 16, TV (machine): 300, FiO2: 100, PEEP: 5  08-30-23 @ 07:01  -  08-31-23 @ 07:00  --------------------------------------------------------  IN: 2586.8 mL / OUT: 1485 mL / NET: 1101.8 mL    08-31-23 @ 07:01  -  08-31-23 @ 12:20  --------------------------------------------------------  IN: 0 mL / OUT: 0 mL / NET: 0 mL        CAPILLARY BLOOD GLUCOSE  151 (29 Aug 2023 17:15)      POCT Blood Glucose.: 235 mg/dL (31 Aug 2023 08:56)      I&O's Summary    30 Aug 2023 07:01  -  31 Aug 2023 07:00  --------------------------------------------------------  IN: 2586.8 mL / OUT: 1485 mL / NET: 1101.8 mL    31 Aug 2023 07:01  -  31 Aug 2023 12:20  --------------------------------------------------------  IN: 0 mL / OUT: 0 mL / NET: 0 mL        PHYSICAL EXAM:  Gen: NAD, intubated  Neuro: on propofol, opens eyes to name, unable to follow commands  HEENT: NC/AT, EOMI, PERRLA, orally multiple areas of dried blood  Resp: CTA b/l, no w/r/r  Cardio: RRR, s1/s2, no m/r/g  Abd: soft, NT, ND, normoactive bowels sounds  Ext: LUE splinted, +2 peripheral pulses radial and dorsalis pedis, no clubbing, cyanosis, edema  Skin: multiple lacerations on all 4 extremities with dressings in place    Meds:  cefepime   IVPB IV Intermittent  vancomycin  IVPB IV Intermittent    norepinephrine Infusion IV Continuous    dextrose 50% Injectable IV Push  dextrose 50% Injectable IV Push  dextrose 50% Injectable IV Push  dextrose Oral Gel Oral  glucagon  Injectable IntraMuscular      propofol Infusion IV Continuous      heparin   Injectable SubCutaneous    famotidine Injectable IV Push      cyanocobalamin Oral  lactated ringers Bolus IV Bolus  lactated ringers. IV Continuous  multivitamin/minerals/iron Oral Solution (CENTRUM) Oral  thiamine IVPB IV Intermittent      chlorhexidine 2% Cloths Topical                              8.3    3.32  )-----------( 54       ( 31 Aug 2023 05:40 )             24.4     Bands 3.0    08-31    147<H>  |  122<H>  |  29<H>  ----------------------------<  208<H>  9.4<HH>   |  22  |  0.36<L>    Ca    6.4<LL>      31 Aug 2023 09:09  Phos  12.4     08-31  Mg     1.9     08-31    TPro  4.5<L>  /  Alb  2.5<L>  /  TBili  1.7<H>  /  DBili  x   /  AST  355<H>  /  ALT  312<H>  /  AlkPhos  108  08-31            Urinalysis Basic - ( 31 Aug 2023 09:09 )    Color: x / Appearance: x / SG: x / pH: x  Gluc: 208 mg/dL / Ketone: x  / Bili: x / Urobili: x   Blood: x / Protein: x / Nitrite: x   Leuk Esterase: x / RBC: x / WBC x   Sq Epi: x / Non Sq Epi: x / Bacteria: x      Clean Catch Clean Catch (Midstream)   <10,000 CFU/mL Normal Urogenital Micki -- 08-27 @ 13:20              Radiology: < from: Xray Chest 1 View- PORTABLE-Urgent (Xray Chest 1 View- PORTABLE-Urgent .) (08.31.23 @ 10:28) >  AP portable chest x-ray from August 31, 2023 at 10:18 AM:    CLINICAL INFORMATION: Intubation. Assess ET tube.    INTERPRETATION:    Image limited by rotation. Inferior lateral right hemithorax excluded   from image.  ET tube tip is approximately 4.3 cm above the candie.  Enteric tube tip is in the stomach.  The included right lung is clear.  There is new left perihilar opacity.  There is continued left lower/retrocardiac opacity.  No definite right pleural effusion, however the inferior lateral right   hemithorax is excluded and not evaluated.  No pneumothorax.    IMPRESSION:  ET tube tip is approximately 4.3 cm above the candie.    Enteric tube tipis in the stomach on the most recent image.    New left perihilar opacity on that image may be due to atelectasis or   developing pneumonia.    Continued left lower/retrocardiac opacity which could be due to a left   pleural effusion with associated passive atelectasis, atelectasis of   other cause, and/or pneumonia.    --- End of Report ---    < end of copied text >  < from: Xray Wrist 3 Views, Left (08.29.23 @ 15:38) >  ACC: [id number] EXAM:  XR WRIST COMP MIN 3 VIEWS LT   ORDERED BY: ALFREDO RUIZ     PROCEDURE DATE:  08/29/2023          INTERPRETATION:  CLINICAL HISTORY: Postreduction    TECHNIQUE: AP and lateral views of the LEFT wrist  were obtained.    FINDINGS: Following reduction fracture segments are in anatomical   alignment.  Fracture is stabilized by applied cast.    IMPRESSION: Reduction of fracture segments as described.    --- End of Report ---    < end of copied text >      Bedside Ultrasound:    Tubes/Lines: mike, central venous line L femoral, a-line L femoral, NG tube, ET tube, peripheral IV access R cephalic      GLOBAL ISSUE/BEST PRACTICE:  Analgesia: N  Sedation: Y  HOB elevation: Y  Stress ulcer prophylaxis: Y  VTE prophylaxis: Y  Glycemic control: N  Nutrition: Y    CODE STATUS: full code     INTERVAL HPI/OVERNIGHT EVENTS: Pt was weaned off NC overnight, placed back on NC this AM. Intubated this AM    SUBJECTIVE: Seen and examined pt at bedside. Pt opens eyes to name, able to squeeze fingers with R hand., when asked to speak, pt unable to.    Review of Systems:  Unable to obtain, pt intubated    ICU Vital Signs Last 24 Hrs  T(C): 34.4 (31 Aug 2023 11:56), Max: 36.9 (30 Aug 2023 15:00)  T(F): 94 (31 Aug 2023 11:56), Max: 98.4 (30 Aug 2023 15:00)  HR: 73 (31 Aug 2023 10:35) (71 - 110)  BP: 80/53 (31 Aug 2023 10:18) (80/53 - 132/87)  BP(mean): 61 (31 Aug 2023 10:18) (58 - 105)  ABP: --  ABP(mean): --  RR: 20 (31 Aug 2023 10:18) (10 - 32)  SpO2: 100% (31 Aug 2023 10:35) (92% - 100%)    O2 Parameters below as of 31 Aug 2023 05:00  Patient On (Oxygen Delivery Method): nasal cannula  O2 Flow (L/min): 4          Mode: AC/ CMV (Assist Control/ Continuous Mandatory Ventilation), RR (machine): 16, TV (machine): 300, FiO2: 100, PEEP: 5  08-30-23 @ 07:01  -  08-31-23 @ 07:00  --------------------------------------------------------  IN: 2586.8 mL / OUT: 1485 mL / NET: 1101.8 mL    08-31-23 @ 07:01  -  08-31-23 @ 12:20  --------------------------------------------------------  IN: 0 mL / OUT: 0 mL / NET: 0 mL        CAPILLARY BLOOD GLUCOSE  151 (29 Aug 2023 17:15)      POCT Blood Glucose.: 235 mg/dL (31 Aug 2023 08:56)      I&O's Summary    30 Aug 2023 07:01  -  31 Aug 2023 07:00  --------------------------------------------------------  IN: 2586.8 mL / OUT: 1485 mL / NET: 1101.8 mL    31 Aug 2023 07:01  -  31 Aug 2023 12:20  --------------------------------------------------------  IN: 0 mL / OUT: 0 mL / NET: 0 mL        PHYSICAL EXAM:  Gen: NAD, intubated  Neuro: on propofol, opens eyes to name, unable to follow commands  HEENT: NC/AT, EOMI, PERRLA, orally multiple areas of dried blood  Resp: CTA b/l, no w/r/r  Cardio: RRR, s1/s2, no m/r/g  Abd: soft, NT, ND, normoactive bowels sounds  Ext: LUE splinted, +2 peripheral pulses radial and dorsalis pedis, no clubbing, cyanosis, edema  Skin: multiple lacerations on all 4 extremities with dressings in place    Meds:  cefepime   IVPB IV Intermittent  vancomycin  IVPB IV Intermittent    norepinephrine Infusion IV Continuous    dextrose 50% Injectable IV Push  dextrose 50% Injectable IV Push  dextrose 50% Injectable IV Push  dextrose Oral Gel Oral  glucagon  Injectable IntraMuscular      propofol Infusion IV Continuous      heparin   Injectable SubCutaneous    famotidine Injectable IV Push      cyanocobalamin Oral  lactated ringers Bolus IV Bolus  lactated ringers. IV Continuous  multivitamin/minerals/iron Oral Solution (CENTRUM) Oral  thiamine IVPB IV Intermittent      chlorhexidine 2% Cloths Topical                              8.3    3.32  )-----------( 54       ( 31 Aug 2023 05:40 )             24.4     Bands 3.0    08-31    147<H>  |  122<H>  |  29<H>  ----------------------------<  208<H>  9.4<HH>   |  22  |  0.36<L>    Ca    6.4<LL>      31 Aug 2023 09:09  Phos  12.4     08-31  Mg     1.9     08-31    TPro  4.5<L>  /  Alb  2.5<L>  /  TBili  1.7<H>  /  DBili  x   /  AST  355<H>  /  ALT  312<H>  /  AlkPhos  108  08-31            Urinalysis Basic - ( 31 Aug 2023 09:09 )    Color: x / Appearance: x / SG: x / pH: x  Gluc: 208 mg/dL / Ketone: x  / Bili: x / Urobili: x   Blood: x / Protein: x / Nitrite: x   Leuk Esterase: x / RBC: x / WBC x   Sq Epi: x / Non Sq Epi: x / Bacteria: x      Clean Catch Clean Catch (Midstream)   <10,000 CFU/mL Normal Urogenital Micki -- 08-27 @ 13:20              Radiology: < from: Xray Chest 1 View- PORTABLE-Urgent (Xray Chest 1 View- PORTABLE-Urgent .) (08.31.23 @ 10:28) >  AP portable chest x-ray from August 31, 2023 at 10:18 AM:    CLINICAL INFORMATION: Intubation. Assess ET tube.    INTERPRETATION:    Image limited by rotation. Inferior lateral right hemithorax excluded   from image.  ET tube tip is approximately 4.3 cm above the candie.  Enteric tube tip is in the stomach.  The included right lung is clear.  There is new left perihilar opacity.  There is continued left lower/retrocardiac opacity.  No definite right pleural effusion, however the inferior lateral right   hemithorax is excluded and not evaluated.  No pneumothorax.    IMPRESSION:  ET tube tip is approximately 4.3 cm above the candie.    Enteric tube tipis in the stomach on the most recent image.    New left perihilar opacity on that image may be due to atelectasis or   developing pneumonia.    Continued left lower/retrocardiac opacity which could be due to a left   pleural effusion with associated passive atelectasis, atelectasis of   other cause, and/or pneumonia.    --- End of Report ---    < end of copied text >  < from: Xray Wrist 3 Views, Left (08.29.23 @ 15:38) >  ACC: [id number] EXAM:  XR WRIST COMP MIN 3 VIEWS LT   ORDERED BY: ALFREDO RUIZ     PROCEDURE DATE:  08/29/2023          INTERPRETATION:  CLINICAL HISTORY: Postreduction    TECHNIQUE: AP and lateral views of the LEFT wrist  were obtained.    FINDINGS: Following reduction fracture segments are in anatomical   alignment.  Fracture is stabilized by applied cast.    IMPRESSION: Reduction of fracture segments as described.    --- End of Report ---    < end of copied text >      Bedside Ultrasound:    Tubes/Lines: mike, central venous line L femoral, a-line L femoral, NG tube, ET tube, peripheral IV access R cephalic      GLOBAL ISSUE/BEST PRACTICE:  Analgesia: N  Sedation: Y  HOB elevation: Y  Stress ulcer prophylaxis: Famotidine  VTE prophylaxis: Heparin SubQ  Glycemic control: N  Nutrition: Held    CODE STATUS: full code

## 2023-08-31 NOTE — PROGRESS NOTE ADULT - SUBJECTIVE AND OBJECTIVE BOX
Patient is a 53y old  Female who presents with a chief complaint of fall, pancytopenia (27 Aug 2023 17:14)       HPI:  54yo F with PMH anemia, hypothyroidism, osteoporosis, chronic hyponatremia, depression, anxiety presents to ED on 8/27/23 due to weakness and hypoglycemia.  Patient was seen at this emergency room 8/24 ago for a fall at physical therapy 8/23.  Was diagnosed with left wrist fracture at that time.  Splint was applied and seen by orthopedics.  Recommended admission due to fall risk and unsafe discharge.  Patient refused admission and left AMA.  Patient was fully aware of  fall risk and all lab abnormalities.  States she wanted to follow-up outpatient.  Today patient became weak and aide assisted her down to the floor.  Denies hitting head or any injuries from the fall because aide assisted her.  EMS was called.  Sugar on arrival per EMS was 48 and given D10.  Sugar on arrival to emergency room was 171.  Patient denies any pain or complaints.  Denies any injuries from the fall.  At this time patient is refusing any treatment or test.  States she just wants to go home and eat.  States her sugar was low because she did not eat today but she says she will eat when she gets home. Patient removed splint that was placed 2 days ago and was not wearing splint today per EMS. Patient's sister reports that she has been getting worse for the last 6 months and has become increasingly more frail. Patient lives alone with aides as per sister. Patient has ritualistic eating habits and a diet which includes eggs, fish, and vegetables. She currently takes no medications.   Renal consulted for multiple electrolyte abnormalities.  She states she wants to urinate.  Denies any N/V/SOB.  Eating. States she had edema.          PAST MEDICAL & SURGICAL HISTORY:  Anemia      Depression      Chronic musculoskeletal pain      Anorexia  at age 15 yrs      No significant past surgical history           FAMILY HISTORY:  No pertinent family history in first degree relatives    [place]    Social History:Non smoker    MEDICATIONS  (STANDING):  dextrose 50% Injectable 25 Gram(s) IV Push once  dextrose 50% Injectable 12.5 Gram(s) IV Push once  dextrose 50% Injectable 25 Gram(s) IV Push once  dextrose Oral Gel 15 Gram(s) Oral once  glucagon  Injectable 1 milliGRAM(s) IntraMuscular once  levothyroxine 25 MICROGram(s) Oral daily  potassium chloride    Tablet ER 40 milliEquivalent(s) Oral once    MEDICATIONS  (PRN):  melatonin 3 milliGRAM(s) Oral at bedtime PRN Insomnia   Meds reviewed    Allergies    No Known Allergies    Intolerances         REVIEW OF SYSTEMS:    Review of Systems:   Constitutional: weakness  HEENT: Denies headaches and dizziness  Respiratory: denies SOB, cough, or wheezing  Cardiovascular: denies CP, palpitations  Gastrointestinal: Denies nausea, denies vomiting, diarrhea, constipation, abdominal pain, or bloody stools  Genitourinary: denies painful urination, increased frequency, urgency, or bloody urine  Skin: denies rashes or itching  Musculoskeletal: denies muscle aches, joint swelling  Neurologic: Denies generalized weakness, denies loss of sensation, numbness, or tingling      Vital Signs Last 24 Hrs  T(C): 36.1 (30 Aug 2023 08:17), Max: 36.9 (29 Aug 2023 13:00)  T(F): 97 (30 Aug 2023 08:17), Max: 98.4 (29 Aug 2023 13:00)  HR: 74 (30 Aug 2023 07:00) (68 - 112)  BP: 119/77 (30 Aug 2023 07:00) (90/50 - 145/96)  BP(mean): 92 (30 Aug 2023 07:00) (64 - 117)  RR: 10 (30 Aug 2023 07:00) (10 - 31)  SpO2: 98% (30 Aug 2023 07:00) (87% - 100%)    Parameters below as of 29 Aug 2023 20:00  Patient On (Oxygen Delivery Method): mask, Venturi  O2 Flow (L/min): 15  O2 Concentration (%): 50    PHYSICAL EXAM:    GENERAL: cachectic, fraill appearing  HEAD:  Atraumatic, Normocephalic  EYES: EOMI, conjunctiva and sclera clear  ENMT: No Drainage from nares, No drainage from ears  NECK: Supple, neck  veins full  NERVOUS SYSTEM:  Awake and Alert  CHEST/LUNG: Clear to percussion bilaterally; No rales, rhonchi, wheezing, or rubs  HEART: Regular rate and rhythm; No murmurs, rubs, or gallops  ABDOMEN: Soft, Nontender, Nondistended; Bowel sounds present  EXTREMITIES:  No Edema  SKIN: + Ecchoymosis LE      LABS:                        10.4   3.38  )-----------( 64       ( 30 Aug 2023 06:00 )             29.3     08-30    145  |  113<H>  |  26<H>  ----------------------------<  82  4.2   |  25  |  0.38<L>    Ca    7.1<L>      30 Aug 2023 06:00  Phos  3.7     08-30  Mg     1.8     08-30    TPro  5.3<L>  /  Alb  2.9<L>  /  TBili  1.8<H>  /  DBili  x   /  AST  427<H>  /  ALT  363<H>  /  AlkPhos  81  08-30      Urinalysis Basic - ( 30 Aug 2023 06:00 )    Color: x / Appearance: x / SG: x / pH: x  Gluc: 82 mg/dL / Ketone: x  / Bili: x / Urobili: x   Blood: x / Protein: x / Nitrite: x   Leuk Esterase: x / RBC: x / WBC x   Sq Epi: x / Non Sq Epi: x / Bacteria: x        ABG - ( 29 Aug 2023 22:08 )  pH, Arterial: 7.54  pH, Blood: x     /  pCO2: 25    /  pO2: 155   / HCO3: 21    / Base Excess: -1.1  /  SaO2: 98.3

## 2023-08-31 NOTE — PROGRESS NOTE ADULT - SUBJECTIVE AND OBJECTIVE BOX
INTERVAL HPI/OVERNIGHT EVENTS:  Patient seen and examined at bedside. Patient with respiratory distress this AM, was re-intubated and sedated. Unable to obtain comprehensive ROS and HPI due to medical condition.     MEDICATIONS  (STANDING):  cefepime   IVPB 2000 milliGRAM(s) IV Intermittent every 8 hours  chlorhexidine 0.12% Liquid 15 milliLiter(s) Oral Mucosa every 12 hours  chlorhexidine 2% Cloths 1 Application(s) Topical <User Schedule>  cyanocobalamin 1000 MICROGram(s) Oral daily  dextrose 50% Injectable 25 Gram(s) IV Push once  dextrose 50% Injectable 12.5 Gram(s) IV Push once  dextrose 50% Injectable 25 Gram(s) IV Push once  dextrose Oral Gel 15 Gram(s) Oral once  famotidine Injectable 20 milliGRAM(s) IV Push two times a day  glucagon  Injectable 1 milliGRAM(s) IntraMuscular once  heparin   Injectable 5000 Unit(s) SubCutaneous every 12 hours  lactated ringers. 1000 milliLiter(s) (60 mL/Hr) IV Continuous <Continuous>  multivitamin/minerals/iron Oral Solution (CENTRUM) 15 milliLiter(s) Oral daily  norepinephrine Infusion 0.05 MICROgram(s)/kG/Min (2.5 mL/Hr) IV Continuous <Continuous>  propofol Infusion 20 MICROgram(s)/kG/Min (3.2 mL/Hr) IV Continuous <Continuous>  thiamine IVPB 200 milliGRAM(s) IV Intermittent daily  vancomycin  IVPB 500 milliGRAM(s) IV Intermittent every 12 hours  vasopressin Infusion 0.04 Unit(s)/Min (6 mL/Hr) IV Continuous <Continuous>    MEDICATIONS  (PRN):      Allergies    No Known Allergies    Intolerances      Vital Signs Last 24 Hrs  T(C): 32.8 (31 Aug 2023 16:15), Max: 36.8 (30 Aug 2023 20:04)  T(F): 91 (31 Aug 2023 16:15), Max: 98.2 (30 Aug 2023 20:04)  HR: 82 (31 Aug 2023 17:42) (62 - 110)  BP: 123/85 (31 Aug 2023 16:15) (80/49 - 134/88)  BP(mean): 99 (31 Aug 2023 16:15) (58 - 106)  RR: 28 (31 Aug 2023 16:15) (12 - 32)  SpO2: 97% (31 Aug 2023 17:42) (92% - 100%)    Parameters below as of 31 Aug 2023 05:00  Patient On (Oxygen Delivery Method): nasal cannula  O2 Flow (L/min): 4      08-30 @ 07:01  -  08-31 @ 07:00  --------------------------------------------------------  IN: 2596.8 mL / OUT: 1485 mL / NET: 1111.8 mL    08-31 @ 07:01  -  08-31 @ 18:00  --------------------------------------------------------  IN: 2251.7 mL / OUT: 100 mL / NET: 2151.7 mL        Physical Exam:  General: frail, emaciated, muscle wasting, cachectic, ill appearing, intubated  Neurology: intubated, does not follow commands  Respiratory: decreased breath sounds B/L bases  CV: RRR, S1S2  Abdominal: Soft, NT, ND +BS  Extremities: +splint LUE, multiple bandages all 4 extremities, + peripheral pulses    LABS:                        6.7    1.63  )-----------( 60       ( 31 Aug 2023 13:05 )             19.1     08-31    149<H>  |  122<H>  |  37<H>  ----------------------------<  145<H>  5.6<H>   |  24  |  0.44<L>    Ca    6.1<LL>      31 Aug 2023 13:05  Phos  5.6     08-31  Mg     2.0     08-31    TPro  4.1<L>  /  Alb  2.2<L>  /  TBili  1.5<H>  /  DBili  x   /  AST  446<H>  /  ALT  332<H>  /  AlkPhos  135<H>  08-31    PT/INR - ( 31 Aug 2023 13:05 )   PT: 14.8 sec;   INR: 1.27 ratio         PTT - ( 31 Aug 2023 13:05 )  PTT:43.2 sec  Urinalysis Basic - ( 31 Aug 2023 13:05 )    Color: x / Appearance: x / SG: x / pH: x  Gluc: 145 mg/dL / Ketone: x  / Bili: x / Urobili: x   Blood: x / Protein: x / Nitrite: x   Leuk Esterase: x / RBC: x / WBC x   Sq Epi: x / Non Sq Epi: x / Bacteria: x        RADIOLOGY & ADDITIONAL TESTS:

## 2023-08-31 NOTE — PROCEDURE NOTE - ADDITIONAL PROCEDURE DETAILS
Procedure performed independent of critical care time. Dx: shock nos L femoral artery arterial line placed Procedure performed independent of critical care time. Dx: shock nos

## 2023-08-31 NOTE — PROGRESS NOTE ADULT - ASSESSMENT
MYRNA  Hyponatremia  Hypokalemia/hyperkalemia   Hypothyroidism  FTT  Anemia   Acute respiratory failure  Bradycardia     -MYRNA likely from hypoglycemic event, improving  -Suspect low solute in take, tea and toast physiology  -Check urine lytes - pending   -PRBCs per primary  -TSH elevated, check T4, could also cause hyponatremia  -Deemed not to have capacity by psych  -Sodium improving  -DC IVF. On TF  -Monitor for refeeding when using GI tract  -Hyperchloremic acidosis, mild  -Patient with severe hyperkalemia, will repeat to ensure accuracy, if is accurate, will give lasix and calcium as with her preserved renal function should be able to use normal kidney physiology to treat but may need emergent dialysis if not responding.    D/W ICU

## 2023-08-31 NOTE — CHART NOTE - NSCHARTNOTEFT_GEN_A_CORE
: Emilee SANDY, Cecil SANDY    INDICATION: Shock    PROCEDURE:  [x] LIMITED ECHO  [x] LIMITED CHEST  [ ] LIMITED RETROPERITONEAL  [ ] LIMITED ABDOMINAL  [ ] LIMITED DVT  [ ] NEEDLE GUIDANCE VASCULAR  [ ] NEEDLE GUIDANCE THORACENTESIS  [ ] NEEDLE GUIDANCE PARACENTESIS  [ ] NEEDLE GUIDANCE PERICARDIOCENTESIS  [ ] OTHER    FINDINGS: Hyperdynamic LV, RV not enlarged, IVC with >50% variation, small pericardial effusion, no diastolic RV collapse; a-line predominant on right, no right pleural effusion, basilar b-lines w/ moderate pleural effusion at L base; b/l pleural sliding present.    INTERPRETATION: Findings consistent with possible hypovolemic v hemorrhagic v septic shock, no obvious pna, no sonographic signs of tamponade, no pneumothorax.      Images uploaded to [employer]

## 2023-08-31 NOTE — CHART NOTE - NSCHARTNOTEFT_GEN_A_CORE
Assessment/Follow up: Pt seen for malnutrition follow up; receiving central line at time of visit. Reintubated this morning 8/31. Sedated on low dose propofol. Levophed for BP support. Previously on Vital 1.5 trickle feeds @10cc/hr via NGT x ~15hrs (3pm-6am). TF now held. Hypoactive BS, last BM 8/28 per EMR. On LR @60cc/hr. Continues on MVI w/ minerals, thiamine, Vit B12 supplementation. Recommends resuming enteral nutrition when medically feasible. Severely malnourished states/high refeeding risk. Recommend Vital 1.5 @10cc/hr with slow increase by 5cc q 24hrs until goal rate 40cc/hr x20hrs. Recommend strict electrolyte monitoring q 6hrs. Recommend TF adjustments with adjustments in propofol.     Factors impacting intake: [ ] none [ ] nausea  [ ] vomiting [ ] diarrhea [ ] constipation  [ ]chewing problems [ ] swallowing issues  [ x] other: intubated/sedated, hx severe anorexia     Diet Presciption: Diet, NPO with Tube Feed:   Tube Feeding Modality: Orogastric  Vital 1.5 Alan  Total Volume for 24 Hours (mL): 800  Continuous  Starting Tube Feed Rate {mL per Hour}: 10  Increase Tube Feed Rate by (mL): 10     Every 24 hours  Until Goal Tube Feed Rate (mL per Hour): 40  Tube Feed Duration (in Hours): 20  Tube Feed Start Time: 09:00  Tube Feed Stop Time: 05:00  Free Water Flush (08-29-23 @ 08:55)    Current Weight: Weight (kg): 26.7 (08-28 @ 07:30), 27.2 (08-24 @ 13:35); 57.9lbs(8/31)      Pertinent Medications: MEDICATIONS  (STANDING):  cefepime   IVPB 2000 milliGRAM(s) IV Intermittent every 8 hours  chlorhexidine 2% Cloths 1 Application(s) Topical <User Schedule>  cyanocobalamin 1000 MICROGram(s) Oral daily  dextrose 50% Injectable 12.5 Gram(s) IV Push once  dextrose 50% Injectable 25 Gram(s) IV Push once  dextrose 50% Injectable 25 Gram(s) IV Push once  dextrose Oral Gel 15 Gram(s) Oral once  famotidine Injectable 20 milliGRAM(s) IV Push two times a day  glucagon  Injectable 1 milliGRAM(s) IntraMuscular once  heparin   Injectable 5000 Unit(s) SubCutaneous every 12 hours  lactated ringers Bolus 500 milliLiter(s) IV Bolus once  lactated ringers. 1000 milliLiter(s) (60 mL/Hr) IV Continuous <Continuous>  multivitamin/minerals/iron Oral Solution (CENTRUM) 15 milliLiter(s) Oral daily  norepinephrine Infusion 0.05 MICROgram(s)/kG/Min (2.5 mL/Hr) IV Continuous <Continuous>  propofol Infusion 20 MICROgram(s)/kG/Min (3.2 mL/Hr) IV Continuous <Continuous>  thiamine IVPB 200 milliGRAM(s) IV Intermittent daily  vancomycin  IVPB 500 milliGRAM(s) IV Intermittent every 12 hours    MEDICATIONS  (PRN):    Pertinent Labs: 08-31 Na147 mmol/L<H> Glu 208 mg/dL<H> K+ 9.4 mmol/L<HH> Cr  0.36 mg/dL<L> BUN 29 mg/dL<H> 08-31 Phos 12.4 mg/dL<H> 08-31 Alb 2.5 g/dL<L> 08-28 Chol 82 mg/dL LDL --    HDL 74 mg/dL Trig <10 mg/dL     CAPILLARY BLOOD GLUCOSE      POCT Blood Glucose.: 235 mg/dL (31 Aug 2023 08:56)  POCT Blood Glucose.: 262 mg/dL (31 Aug 2023 05:14)  POCT Blood Glucose.: 174 mg/dL (30 Aug 2023 23:42)  POCT Blood Glucose.: 136 mg/dL (30 Aug 2023 20:17)  POCT Blood Glucose.: 83 mg/dL (30 Aug 2023 16:04)  POCT Blood Glucose.: 74 mg/dL (30 Aug 2023 13:19)    Skin: medial midline II, right hip II, sacrum I. Richard 9.     Estimated Needs:   [x ] no change since previous assessment: Based off IBW 100lbs. 1132-1359kcal (25-30kcal/kg IBW), 45-54gm protein(1-1.2gm/kg IBW)  [ ] recalculated:     Previous Nutrition Diagnosis:   [ ] Inadequate Energy Intake [ ]Inadequate Oral Intake [ ] Excessive Energy Intake   [ ] Underweight [ ] Increased Nutrient Needs [ ] Overweight/Obesity   [ ] Altered GI Function [ ] Unintended Weight Loss [ ] Food & Nutrition Related Knowledge Deficit [x ] Malnutrition     Nutrition Diagnosis is [x ] ongoing  [ ] resolved [ ] not applicable     New Nutrition Diagnosis: [x ] not applicable       Interventions:   Recommend  [ ] Change Diet To:  [ ] Nutrition Supplement  [x ] Nutrition Support: Resume NGT feedings of Vital 1.5 when medically appropriate. Recommend starting @ 5cc/hr x 20hrs while on propofol with slow increase by 5 cc q 24hrs until goal rate 40cc/hr x 20hrs.   [ x] Other: Continue thiamine supplementation 200mg daily x 10 days, MVI with minerals daily.     Monitoring and Evaluation:   [ ] PO intake [ x ] Tolerance to diet prescription [ x ] weights [ x ] labs[ x ] follow up per protocol  [ ] other:

## 2023-09-01 NOTE — PROGRESS NOTE ADULT - ASSESSMENT
52 y/o F pmhx of anemia, osteoporosis, depression, anxiety, chronic hyponatremia, w/ multiple admissions for malnutrition failure to thrive presented with weakness and hypoglycemia. Patient admitted to MICU with metabolic encephalopathy and acute hypoxic respiratory failure requiring re-intubation.    Neuro: metabolic encephalopathy likely secondary to severe malnutrition   --cont propofol for sedation while intubated  Pulm: AHRF requiring reintubation on AC/CMV at 16/300/5/50%, repeat ABG.   --CT c/a/p angio reviewed, see ID section  Cardio: septic shock likely from pna, requiring pressor support with levophed. maintain map > 65. cont D5LR mIVF  --will give albumin 5% 250cc x 1   GI: with abdominal distension s/p abd x-ray (personally reviewed, official read pending). resume diet NPO with tube feeds.   --chronic malnutrition: hypoalbuminemia   --elevated lfts, likely in setting of shock   --elevated tbili-> downtrending  --CT c/a/p angio reviewed  Renal: likely MYRNA given patients baseline Cr is low. cont mIVF with D5LR  --monitor UOP w/indwelling catheter  --electrolytes reviewed from AM  --mg repleted  Endo: with hypoglycemic episodes, required D50 overnight. monitor blood glucose q4hrs. start mIVF w/ D5  --hypocalcemia, hyperparathyroidism, will hold off on Ca++ supp  MSK: 8/24 L distal radius fx: comminuted, relatively non-displaced   --seen by ortho, splint placed, confirmed by xray  Heme: anemic, s/p 1u pRBC, SCDs for vte ppx for now.  ID: likely septic shock in setting of pna, follow up sputum cx, cont cefepime, mrsa previously negative, vanco dc-ed. blood cx + strep sp, awaiting sensitivites  Skin: L femoral central and A lines, endotracheal tube, ngt  Dispo: full code

## 2023-09-01 NOTE — PROGRESS NOTE ADULT - ASSESSMENT
MYRNA  Hyponatremia  Hypokalemia/hyperkalemia   Hypothyroidism  FTT  Anemia   Acute respiratory failure  Bradycardia  Hyperphosphtemia     -MYRNA likely from hypoglycemic event, improving  -Suspect low solute in take, tea and toast physiology  -Check urine lytes - pending   -PRBCs per primary  -TSH elevated, check T4, could also cause hyponatremia  -Deemed not to have capacity by psych  -Sodium improving  -DC IVF. On TF  -Monitor for refeeding when using GI tract  -Hyperchloremic acidosis, mild  -Patient previous hyperkalemia likely lab error, but still high and phosphorus still high. She is developing MYRNA again as well.  TF on hold for now.  May need IVF   D/W ICU

## 2023-09-01 NOTE — PROGRESS NOTE ADULT - PROBLEM SELECTOR PLAN 7
TSH 11.4, pt endorses being very cold    - f/u ft4, ft3  -plan per ICU recs

## 2023-09-01 NOTE — PROGRESS NOTE ADULT - PROBLEM SELECTOR PLAN 10
behavioral health consulted, recorded history of schizoid personality disorder, anorexia, and hospitalization over 20 years ago    - f/u psych recs
behavioral health consulted, recorded history of schizoid personality disorder, anorexia, and hospitalization over 20 years ago

## 2023-09-01 NOTE — PROGRESS NOTE ADULT - SUBJECTIVE AND OBJECTIVE BOX
Patient is a 53y old  Female who presents with a chief complaint of fall, pancytopenia (27 Aug 2023 17:14)       HPI:  52yo F with PMH anemia, hypothyroidism, osteoporosis, chronic hyponatremia, depression, anxiety presents to ED on 8/27/23 due to weakness and hypoglycemia.  Patient was seen at this emergency room 8/24 ago for a fall at physical therapy 8/23.  Was diagnosed with left wrist fracture at that time.  Splint was applied and seen by orthopedics.  Recommended admission due to fall risk and unsafe discharge.  Patient refused admission and left AMA.  Patient was fully aware of  fall risk and all lab abnormalities.  States she wanted to follow-up outpatient.  Today patient became weak and aide assisted her down to the floor.  Denies hitting head or any injuries from the fall because aide assisted her.  EMS was called.  Sugar on arrival per EMS was 48 and given D10.  Sugar on arrival to emergency room was 171.  Patient denies any pain or complaints.  Denies any injuries from the fall.  At this time patient is refusing any treatment or test.  States she just wants to go home and eat.  States her sugar was low because she did not eat today but she says she will eat when she gets home. Patient removed splint that was placed 2 days ago and was not wearing splint today per EMS. Patient's sister reports that she has been getting worse for the last 6 months and has become increasingly more frail. Patient lives alone with aides as per sister. Patient has ritualistic eating habits and a diet which includes eggs, fish, and vegetables. She currently takes no medications.   Renal consulted for multiple electrolyte abnormalities.  She states she wants to urinate.  Denies any N/V/SOB.  Eating. States she had edema.          PAST MEDICAL & SURGICAL HISTORY:  Anemia      Depression      Chronic musculoskeletal pain      Anorexia  at age 15 yrs      No significant past surgical history           FAMILY HISTORY:  No pertinent family history in first degree relatives    [place]    Social History:Non smoker    MEDICATIONS  (STANDING):  dextrose 50% Injectable 25 Gram(s) IV Push once  dextrose 50% Injectable 12.5 Gram(s) IV Push once  dextrose 50% Injectable 25 Gram(s) IV Push once  dextrose Oral Gel 15 Gram(s) Oral once  glucagon  Injectable 1 milliGRAM(s) IntraMuscular once  levothyroxine 25 MICROGram(s) Oral daily  potassium chloride    Tablet ER 40 milliEquivalent(s) Oral once    MEDICATIONS  (PRN):  melatonin 3 milliGRAM(s) Oral at bedtime PRN Insomnia   Meds reviewed    Allergies    No Known Allergies    Intolerances         REVIEW OF SYSTEMS:    Review of Systems:   Constitutional: weakness  HEENT: Denies headaches and dizziness  Respiratory: denies SOB, cough, or wheezing  Cardiovascular: denies CP, palpitations  Gastrointestinal: Denies nausea, denies vomiting, diarrhea, constipation, abdominal pain, or bloody stools  Genitourinary: denies painful urination, increased frequency, urgency, or bloody urine  Skin: denies rashes or itching  Musculoskeletal: denies muscle aches, joint swelling  Neurologic: Denies generalized weakness, denies loss of sensation, numbness, or tingling      ICU Vital Signs Last 24 Hrs  T(C): 37.1 (01 Sep 2023 13:00), Max: 37.1 (01 Sep 2023 13:00)  T(F): 98.7 (01 Sep 2023 13:00), Max: 98.7 (01 Sep 2023 13:00)  HR: 81 (01 Sep 2023 12:10) (80 - 119)  BP: 93/62 (01 Sep 2023 10:00) (83/52 - 138/90)  BP(mean): 74 (01 Sep 2023 10:00) (61 - 111)  ABP: 110/74 (01 Sep 2023 10:00) (82/56 - 130/93)  ABP(mean): 92 (01 Sep 2023 10:00) (64 - 105)  RR: 19 (01 Sep 2023 10:00) (14 - 46)  SpO2: 95% (01 Sep 2023 12:10) (93% - 100%)        PHYSICAL EXAM:    GENERAL: cachectic, fraill appearing  HEAD:  Atraumatic, Normocephalic  EYES: EOMI, conjunctiva and sclera clear  ENMT: No Drainage from nares, No drainage from ears  NECK: Supple, neck  veins full  NERVOUS SYSTEM:  Awake and Alert  CHEST/LUNG: Clear to percussion bilaterally; No rales, rhonchi, wheezing, or rubs  HEART: Regular rate and rhythm; No murmurs, rubs, or gallops  ABDOMEN: Soft, Nontender, Nondistended; Bowel sounds present  EXTREMITIES:  No Edema  SKIN: + Ecchoymosis LE      LABS:                                   10.4   1.48  )-----------( 45       ( 01 Sep 2023 07:07 )             30.7     09-01    146<H>  |  121<H>  |  48<H>  ----------------------------<  106<H>  5.3   |  23  |  0.65    Ca    5.9<LL>      01 Sep 2023 07:07  Phos  5.4     09-01  Mg     1.7     09-01    TPro  4.1<L>  /  Alb  2.2<L>  /  TBili  1.5<H>  /  DBili  x   /  AST  234<H>  /  ALT  248<H>  /  AlkPhos  90  09-01    PT/INR - ( 31 Aug 2023 13:05 )   PT: 14.8 sec;   INR: 1.27 ratio         PTT - ( 31 Aug 2023 13:05 )  PTT:43.2 sec  Urinalysis Basic - ( 01 Sep 2023 07:07 )    Color: x / Appearance: x / SG: x / pH: x  Gluc: 106 mg/dL / Ketone: x  / Bili: x / Urobili: x   Blood: x / Protein: x / Nitrite: x   Leuk Esterase: x / RBC: x / WBC x   Sq Epi: x / Non Sq Epi: x / Bacteria: x        ABG - ( 01 Sep 2023 10:31 )  pH, Arterial: 7.28  pH, Blood: x     /  pCO2: 42    /  pO2: 62    / HCO3: 20    / Base Excess: -7.0  /  SaO2: 90.1

## 2023-09-01 NOTE — PROGRESS NOTE ADULT - SUBJECTIVE AND OBJECTIVE BOX
INTERVAL HPI/OVERNIGHT EVENTS: No acute overnight events. Pt received D50.    SUBJECTIVE: Seen and examined pt at bedside. Sedated and intubated.     Review of Systems: Unable to obtain - patient is intubated/sedated.    ICU Vital Signs Last 24 Hrs  T(C): 35.3 (01 Sep 2023 07:58), Max: 36.1 (31 Aug 2023 23:35)  T(F): 95.5 (01 Sep 2023 07:58), Max: 97 (31 Aug 2023 23:35)  HR: 81 (01 Sep 2023 12:10) (80 - 119)  BP: 93/62 (01 Sep 2023 10:00) (80/49 - 138/90)  BP(mean): 74 (01 Sep 2023 10:00) (59 - 111)  ABP: 110/74 (01 Sep 2023 10:00) (81/72 - 130/93)  ABP(mean): 92 (01 Sep 2023 10:00) (64 - 105)  RR: 19 (01 Sep 2023 10:00) (14 - 46)  SpO2: 95% (01 Sep 2023 12:10) (93% - 100%)        Mode: AC/ CMV (Assist Control/ Continuous Mandatory Ventilation), RR (machine): 16, TV (machine): 300, FiO2: 30, PEEP: 5  08-31-23 @ 07:01  -  09-01-23 @ 07:00  --------------------------------------------------------  IN: 4176.1 mL / OUT: 505 mL / NET: 3671.1 mL        CAPILLARY BLOOD GLUCOSE      POCT Blood Glucose.: 95 mg/dL (01 Sep 2023 07:59)      I&O's Summary    31 Aug 2023 07:01  -  01 Sep 2023 07:00  --------------------------------------------------------  IN: 4176.1 mL / OUT: 505 mL / NET: 3671.1 mL        PHYSICAL EXAM:  Gen: emaciated female  Neuro: sedated  HEENT: intubated  Resp: good air entry b/l  Cardio: rrr  Abd: soft, nt, distended  Ext: scattered ecchmyoses, LUE with splint  Skin: as above, warm, dry    Meds:  cefepime   IVPB IV Intermittent    norepinephrine Infusion IV Continuous    dextrose 50% Injectable IV Push  dextrose 50% Injectable IV Push  dextrose 50% Injectable IV Push  dextrose 50% Injectable IV Push  dextrose Oral Gel Oral  glucagon  Injectable IntraMuscular      propofol Infusion IV Continuous        famotidine Injectable IV Push      cyanocobalamin Oral  dextrose 5% + lactated ringers. IV Continuous  lactated ringers. IV Continuous  multivitamin/minerals/iron Oral Solution (CENTRUM) Oral  thiamine IVPB IV Intermittent      chlorhexidine 0.12% Liquid Oral Mucosa  chlorhexidine 2% Cloths Topical                              10.4   1.48  )-----------( 45       ( 01 Sep 2023 07:07 )             30.7     Bands 5.0    09-01    146<H>  |  121<H>  |  48<H>  ----------------------------<  106<H>  5.3   |  23  |  0.65    Ca    5.9<LL>      01 Sep 2023 07:07  Phos  5.4     09-01  Mg     1.7     09-01    TPro  4.1<L>  /  Alb  2.2<L>  /  TBili  1.5<H>  /  DBili  x   /  AST  234<H>  /  ALT  248<H>  /  AlkPhos  90  09-01    Lactate 1.2           08-31 @ 19:25    Lactate 1.0           08-31 @ 13:05          PT/INR - ( 31 Aug 2023 13:05 )   PT: 14.8 sec;   INR: 1.27 ratio         PTT - ( 31 Aug 2023 13:05 )  PTT:43.2 sec  Urinalysis Basic - ( 01 Sep 2023 07:07 )    Color: x / Appearance: x / SG: x / pH: x  Gluc: 106 mg/dL / Ketone: x  / Bili: x / Urobili: x   Blood: x / Protein: x / Nitrite: x   Leuk Esterase: x / RBC: x / WBC x   Sq Epi: x / Non Sq Epi: x / Bacteria: x      .Blood Blood-Peripheral   Growth in anaerobic bottle: Gram Positive Cocci in Pairs and Chains  Direct identification is available within approximately 3-5  hours either by Blood Panel Multiplexed PCR or Direct  MALDI-TOF. Details: [email or web address]   Growth in anaerobic bottle: Gram Positive Cocci in Pairs and Chains 08-31 @ 13:20  Clean Catch Clean Catch (Midstream)   <10,000 CFU/mL Normal Urogenital Micki -- 08-27 @ 13:20              Radiology:    Bedside Ultrasound:    Tubes/Lines:      GLOBAL ISSUE/BEST PRACTICE:  Analgesia: N   Sedation: Y  HOB elevation: Y  Stress ulcer prophylaxis: Y   VTE prophylaxis: Y  Glycemic control: N  Nutrition: diet NPO with tube feeds    CODE STATUS:  FULL CODE

## 2023-09-01 NOTE — PROGRESS NOTE ADULT - PROBLEM SELECTOR PROBLEM 9
Malnutrition, unspecified type

## 2023-09-01 NOTE — PROGRESS NOTE ADULT - PROBLEM SELECTOR PROBLEM 10
History of behavioral and mental health problems

## 2023-09-01 NOTE — PROGRESS NOTE ADULT - PROBLEM SELECTOR PLAN 6
Fall on 8/23, presented to ED on 8/24 and diagnosed with fracture. Xray wrist 8/27 :There is a comminuted large horizontal fracture of the distal radius possibly with extension to the inner endplate. There is mild dorsal indentation distal fracture. pt given splint on previous ED visit    - splint ordered for left wrist  - ortho recommended splint

## 2023-09-01 NOTE — PROGRESS NOTE ADULT - SUBJECTIVE AND OBJECTIVE BOX
Patient is a 53y old  Female who presents with a chief complaint of fall, pancytopenia (01 Sep 2023 13:30)      INTERVAL HPI/OVERNIGHT EVENTS: Patient seen and examined at bedside. No overnight events. Remains intubated/sedated.     MEDICATIONS  (STANDING):  cefepime   IVPB 2000 milliGRAM(s) IV Intermittent every 12 hours  chlorhexidine 0.12% Liquid 15 milliLiter(s) Oral Mucosa every 12 hours  chlorhexidine 2% Cloths 1 Application(s) Topical <User Schedule>  cyanocobalamin 1000 MICROGram(s) Oral daily  dextrose 5% + lactated ringers. 1000 milliLiter(s) (60 mL/Hr) IV Continuous <Continuous>  dextrose 50% Injectable 25 Gram(s) IV Push once  dextrose 50% Injectable 12.5 Gram(s) IV Push once  dextrose 50% Injectable 25 Gram(s) IV Push once  dextrose Oral Gel 15 Gram(s) Oral once  famotidine Injectable 20 milliGRAM(s) IV Push two times a day  glucagon  Injectable 1 milliGRAM(s) IntraMuscular once  lactated ringers. 1000 milliLiter(s) (60 mL/Hr) IV Continuous <Continuous>  multivitamin/minerals/iron Oral Solution (CENTRUM) 15 milliLiter(s) Oral daily  norepinephrine Infusion 0.05 MICROgram(s)/kG/Min (2.5 mL/Hr) IV Continuous <Continuous>  propofol Infusion 20 MICROgram(s)/kG/Min (3.2 mL/Hr) IV Continuous <Continuous>  thiamine IVPB 200 milliGRAM(s) IV Intermittent daily    MEDICATIONS  (PRN):      Allergies    No Known Allergies    Intolerances        REVIEW OF SYSTEMS:  ROS 2/2 intubation/sedation    Vital Signs Last 24 Hrs  T(C): 37.1 (01 Sep 2023 13:00), Max: 37.1 (01 Sep 2023 13:00)  T(F): 98.7 (01 Sep 2023 13:00), Max: 98.7 (01 Sep 2023 13:00)  HR: 81 (01 Sep 2023 14:00) (69 - 119)  BP: 128/86 (01 Sep 2023 14:00) (83/52 - 138/90)  BP(mean): 103 (01 Sep 2023 14:00) (61 - 111)  RR: 26 (01 Sep 2023 14:00) (16 - 46)  SpO2: 100% (01 Sep 2023 14:00) (71% - 100%)        PHYSICAL EXAM:  GENERAL: NAD  HEENT:  anicteric, moist mucous membranes  CHEST/LUNG:  CTA b/l, no rales, wheezes, or rhonchi  HEART:  RRR, S1, S2  ABDOMEN:  BS+, soft, nontender, nondistended  EXTREMITIES: no edema, cyanosis, or calf tenderness  NERVOUS SYSTEM: awake, alert    LABS:                        10.4   1.48  )-----------( 45       ( 01 Sep 2023 07:07 )             30.7     CBC Full  -  ( 01 Sep 2023 07:07 )  WBC Count : 1.48 K/uL  Hemoglobin : 10.4 g/dL  Hematocrit : 30.7 %  Platelet Count - Automated : 45 K/uL  Mean Cell Volume : 93.3 fl  Mean Cell Hemoglobin : 31.6 pg  Mean Cell Hemoglobin Concentration : 33.9 gm/dL  Auto Neutrophil # : 1.23 K/uL  Auto Lymphocyte # : 0.18 K/uL  Auto Monocyte # : 0.07 K/uL  Auto Eosinophil # : 0.00 K/uL  Auto Basophil # : 0.00 K/uL  Auto Neutrophil % : 78.0 %  Auto Lymphocyte % : 12.0 %  Auto Monocyte % : 5.0 %  Auto Eosinophil % : 0.0 %  Auto Basophil % : 0.0 %    01 Sep 2023 07:07    146    |  121    |  48     ----------------------------<  106    5.3     |  23     |  0.65     Ca    5.9        01 Sep 2023 07:07  Phos  5.4       01 Sep 2023 07:07  Mg     1.7       01 Sep 2023 07:07    TPro  4.1    /  Alb  2.2    /  TBili  1.5    /  DBili  x      /  AST  234    /  ALT  248    /  AlkPhos  90     01 Sep 2023 07:07    PT/INR - ( 31 Aug 2023 13:05 )   PT: 14.8 sec;   INR: 1.27 ratio         PTT - ( 31 Aug 2023 13:05 )  PTT:43.2 sec  Urinalysis Basic - ( 01 Sep 2023 07:07 )    Color: x / Appearance: x / SG: x / pH: x  Gluc: 106 mg/dL / Ketone: x  / Bili: x / Urobili: x   Blood: x / Protein: x / Nitrite: x   Leuk Esterase: x / RBC: x / WBC x   Sq Epi: x / Non Sq Epi: x / Bacteria: x      CAPILLARY BLOOD GLUCOSE      POCT Blood Glucose.: 212 mg/dL (01 Sep 2023 12:41)  POCT Blood Glucose.: 61 mg/dL (01 Sep 2023 12:18)  POCT Blood Glucose.: 95 mg/dL (01 Sep 2023 07:59)  POCT Blood Glucose.: 128 mg/dL (01 Sep 2023 04:07)  POCT Blood Glucose.: 271 mg/dL (01 Sep 2023 00:40)  POCT Blood Glucose.: 71 mg/dL (01 Sep 2023 00:11)  POCT Blood Glucose.: 135 mg/dL (31 Aug 2023 19:59)  POCT Blood Glucose.: 148 mg/dL (31 Aug 2023 17:53)        Culture - Blood (collected 08-31-23 @ 13:20)  Source: .Blood Blood-Peripheral  Gram Stain (09-01-23 @ 12:53):    Growth in anaerobic bottle: Gram Positive Cocci in Pairs and Chains    Growth in aerobic bottle: Gram Positive Cocci in Pairs and Chains  Preliminary Report (09-01-23 @ 12:53):    Growth in anaerobic bottle: Gram Positive Cocci in Pairs and Chains    Growth in aerobic bottle: Gram Positive Cocci in Pairs and Chains    Direct identification is available within approximately 3-5    hours either by Blood Panel Multiplexed PCR or Direct    MALDI-TOF. Details: [email or web address]  Organism: Blood Culture PCR (09-01-23 @ 11:08)  Organism: Blood Culture PCR (09-01-23 @ 11:08)      Method Type: PCR      -  Streptococcus sp. (Not Grp A, B or S pneumoniae): Detec    Culture - Urine (collected 08-27-23 @ 13:20)  Source: Clean Catch Clean Catch (Midstream)  Final Report (08-28-23 @ 18:35):    <10,000 CFU/mL Normal Urogenital Micki        RADIOLOGY & ADDITIONAL TESTS:    Personally reviewed.     Consultant(s) Notes Reviewed:  [x] YES  [ ] NO

## 2023-09-01 NOTE — PROGRESS NOTE ADULT - PROBLEM SELECTOR PLAN 5
Patient presenting due to weakness, found to be hypoglycemic by EMS to 48, hyperglycemic on arrival. recent fall on 8/23 leading to broken wrist. concern for malnutrition vs gait disturbance vs deconditioning    - started on ugwpicuqjq-pka-sgapt diet  - f/u nutrition recs  - trend glucose  - f/u PT/OT  - fall risk  - will need psych evaluation once patient more awake and alert/extubated
Patient presenting due to weakness, found to be hypoglycemic by EMS to 48, hyperglycemic on arrival. recent fall on 8/23 leading to broken wrist. concern for malnutrition vs gait disturbance vs deconditioning    - started on mfecshqfvn-lev-lekcs diet  - f/u nutrition recs  - trend glucose  - f/u PT/OT  - fall risk  - will need psych evaluation after patient extubated
Patient presenting due to weakness, found to be hypoglycemic by EMS to 48, hyperglycemic on arrival. recent fall on 8/23 leading to broken wrist. concern for malnutrition vs gait disturbance vs deconditioning    - started on mfverarurp-xqs-jubbi diet  - f/u nutrition recs  - trend glucose  - f/u PT/OT  - fall risk  - will need psych evaluation once patient more awake and alert
Patient presenting due to weakness, found to be hypoglycemic by EMS to 48, hyperglycemic on arrival. recent fall on 8/23 leading to broken wrist. concern for malnutrition vs gait disturbance vs deconditioning    - started on jltelpupwn-yzt-oshqt diet  - f/u nutrition recs  - trend glucose  - f/u PT/OT  - fall risk  - will need psych evaluation once patient more awake and alert/extubated

## 2023-09-01 NOTE — PROGRESS NOTE ADULT - PROBLEM SELECTOR PLAN 3
On arrival, WBC 2.5, Hgb 7.0, plt 123. normocytic. concern for malnutrition    -s/p 1 u PRBC   - maintain active type and screen, transfuse for Hgb < 7  -hem onc dr Schultz consulted

## 2023-09-01 NOTE — PROGRESS NOTE ADULT - PROBLEM SELECTOR PLAN 1
-Patient s/p RRT on 8/28 for unresponsiveness and hypoglycemic episode- intubated for airway protection and transferred to ICU  -Weaned off vent to NC- monitor resp status closely- in respiratory distress today- re-intubated, continue vent settings per ICU  -plan per ICU
-Patient s/p RRT on 8/28 for unresponsiveness and hypoglycemic episode- intubated for airway protection and transferred to ICU  -Continue vent support per ICU  -plan per ICU
-Patient s/p RRT on 8/28 for unresponsiveness and hypoglycemic episode- intubated for airway protection and transferred to ICU  -Weaned off vent to NC- monitor resp status closely- in respiratory distress today- re-intubated, continue vent settings per ICU  -plan per ICU
-Patient s/p RRT on 8/28 for unresponsiveness and hypoglycemic episode- intubated for airway protection and transferred to ICU  -Weaned off vent to NC- monitor resp status closely  -plan per ICU

## 2023-09-01 NOTE — PROGRESS NOTE ADULT - PROBLEM SELECTOR PLAN 2
-likely due to poor PO intake, now hyperglycemia, FS improving, continue to monitor  -S+S to assess for PO diet- now intubated again, will likely need NGT feeds
-likely due to poor PO intake, now hyperglycemia, FS improving, continue to monitor  -Continue tube feeds per ICU, monitor closely for refeeding syndrome
-likely due to poor PO intake, now hyperglycemia, FS improving, continue to monitor  -S+S to assess for PO diet
-likely due to poor PO intake, now hyperglycemia, FS improving, continue to monitor  -S+S to assess for PO diet- now intubated again, will likely need NGT feeds

## 2023-09-01 NOTE — CHART NOTE - NSCHARTNOTEFT_GEN_A_CORE
: Emilee SANDY, Cecil SANDY    INDICATION: Shock    PROCEDURE:  [x] LIMITED ECHO  [x] LIMITED CHEST  [ ] LIMITED RETROPERITONEAL  [ ] LIMITED ABDOMINAL  [ ] LIMITED DVT  [ ] NEEDLE GUIDANCE VASCULAR  [ ] NEEDLE GUIDANCE THORACENTESIS  [ ] NEEDLE GUIDANCE PARACENTESIS  [ ] NEEDLE GUIDANCE PERICARDIOCENTESIS  [ ] OTHER    FINDINGS: Small R pleural effusion, small to moderate simple appearing L pleural effusion, A-line predominant bilaterally, small b/l lower lobe consolidations, pleural sliding present b/l. Trace pericardial effusion, mild global reduced LV function, RV small, IVC small with >50% variability.      INTERPRETATION: Findings consistent with possible hypovolemic v septic shock in setting of multifocal pna, no sonographic signs of tamponade, no pneumothorax, effusions possibly transudative in the setting of hypoalbuminemia w/ volume resuscitation.       Images uploaded to [employer] : Emilee SANDY, Cecil SANDY    INDICATION: Shock    PROCEDURE:  [x] LIMITED ECHO  [x] LIMITED CHEST  [ ] LIMITED RETROPERITONEAL  [ ] LIMITED ABDOMINAL  [ ] LIMITED DVT  [ ] NEEDLE GUIDANCE VASCULAR  [ ] NEEDLE GUIDANCE THORACENTESIS  [ ] NEEDLE GUIDANCE PARACENTESIS  [ ] NEEDLE GUIDANCE PERICARDIOCENTESIS  [ ] OTHER    FINDINGS: Small R pleural effusion, small to moderate simple appearing L pleural effusion, A-line predominant bilaterally, small b/l lower lobe consolidations, pleural sliding present b/l. Trace pericardial effusion, mild global reduced LV function, RV small, IVC small with >50% variability.      INTERPRETATION: Findings consistent with possible hypovolemic v septic shock in setting of multifocal pna, no sonographic signs of tamponade, no pneumothorax, effusions possibly transudative in the setting of hypoalbuminemia w/ volume resuscitation.       Images uploaded to [employer].

## 2023-09-01 NOTE — PROGRESS NOTE ADULT - PROBLEM SELECTOR PLAN 8
on arrival, ast 142, ALT 90. unclear etiology, potentially secondary to malnutrition    - trend CMP  - f/u nutrition recs  -plan per ICU recs

## 2023-09-01 NOTE — PROGRESS NOTE ADULT - PROBLEM SELECTOR PLAN 11
DVT ppx: SCDs i/s/o pancytopenia

## 2023-09-01 NOTE — PROVIDER CONTACT NOTE (EICU) - BACKGROUND
69F admitted to ICU for unresponsiveness, severe protein-malnutrition intubated for airway protection with electrolyte derangements.

## 2023-09-01 NOTE — PROGRESS NOTE ADULT - ASSESSMENT
53F with anorexia, severe malnutrition, hypothyroidism, osteoporosis with multiple fractures, chronic hyponatremia, who presents with failure to thrive. Course complicated by hypoglycemia, bradycardia and hypoxia s/p intubation. Pt now with worsening encephalopathy, septic shock, and hypoxic respiratory failure requiring reintubation on 8/31 found to have:    1. AMS  2. Metabolic encephalopathy  3. Septic shock   4. Aspiration pna  5. hypoxic resp failure   6. Anorexia with severe malnutrion   7. hypokalemia   8. ARF    Plan  Neuro: Metabolic encephalopathy 2/2 electrolyte derangements acute illness. on propofol for vent synchrony add veyr low dose fentanyl to help wean 25mg q4 PRN   Cardio: Remains on high dose levophed 0.49mcg/kg/min have been able to titrate down to 0.33mcg/kg/min. IF not able to come down further would start vasopressin to wean levo further. Source is likley pulmonary and aspiration   Pulm: Remains vented  for her lobar pna. Tonight decreased fi02 to 50^ from 60. Will decrease further as sp02 tolerates with goal od sp02 92% and above.   GI: Trickle feeds. PPI Watch for refeeding syndrome chemistry and lyts q6-8hrs.  Renal: strict i/o, renally dose meds prn. Replace potassium with 20meq x 2 doses tonight. D5 + LR at 60cc/hr . to be gvien calcium for low levels tongiht 1g   ID:Continue cefepime. Blood cultures with strep, follow up species and sensitivities. Check sputum culture  Heme: SCDs only given recent blood transfusion  Endo: tolerating moderate hyperglycemia given risk of refeeding syndrome     Dispo: Remains in ICU, pt is full code

## 2023-09-01 NOTE — PROGRESS NOTE ADULT - SUBJECTIVE AND OBJECTIVE BOX
Patient is a 53y old  Female who presents with a chief complaint of fall, pancytopenia (01 Sep 2023 15:38)      BRIEF HOSPITAL COURSE:   53F with anorexia, severe malnutrition, hypothyroidism, osteoporosis with multiple fractures, chronic hyponatremia, who presents with failure to thrive. Course complicated by hypoglycemia, bradycardia and hypoxia s/p intubation. Pt now with worsening encephalopathy, septic shock, and hypoxic respiratory failure requiring reintubation on 8/31      Events last 24 hours:   Received on high dose levophed. 0.49mcg/kg/min  Remains vent dependent       Social history:  unable to obtain 2/2 intubation     PAST MEDICAL & SURGICAL HISTORY:  Anemia      Depression      Chronic musculoskeletal pain      Anorexia  at age 15 yrs      No significant past surgical history        Allergies    No Known Allergies    Intolerances      FAMILY HISTORY:  No pertinent family history in first degree relatives        Review of Systems:  unable to obtain 2/2 intubation       Medications:  cefepime   IVPB 2000 milliGRAM(s) IV Intermittent every 12 hours    norepinephrine Infusion 0.05 MICROgram(s)/kG/Min IV Continuous <Continuous>      fentaNYL    Injectable 25 MICROGram(s) IV Push every 4 hours PRN  propofol Infusion 20 MICROgram(s)/kG/Min IV Continuous <Continuous>        famotidine Injectable 20 milliGRAM(s) IV Push two times a day      dextrose 50% Injectable 12.5 Gram(s) IV Push once  dextrose 50% Injectable 25 Gram(s) IV Push once  dextrose 50% Injectable 25 Gram(s) IV Push once  dextrose Oral Gel 15 Gram(s) Oral once  glucagon  Injectable 1 milliGRAM(s) IntraMuscular once    calcium gluconate IVPB 1 Gram(s) IV Intermittent once  cyanocobalamin 1000 MICROGram(s) Oral daily  dextrose 5% + lactated ringers. 1000 milliLiter(s) IV Continuous <Continuous>  lactated ringers. 1000 milliLiter(s) IV Continuous <Continuous>  multivitamin/minerals/iron Oral Solution (CENTRUM) 15 milliLiter(s) Oral daily  potassium chloride  20 mEq/100 mL IVPB 20 milliEquivalent(s) IV Intermittent every 1 hour  thiamine IVPB 200 milliGRAM(s) IV Intermittent daily      chlorhexidine 0.12% Liquid 15 milliLiter(s) Oral Mucosa every 12 hours  chlorhexidine 2% Cloths 1 Application(s) Topical <User Schedule>        Mode: AC/ CMV (Assist Control/ Continuous Mandatory Ventilation)  RR (machine): 16  TV (machine): 300  FiO2: 50  PEEP: 5  ITime: 1  MAP: 9  PIP: 23      ICU Vital Signs Last 24 Hrs  T(C): 35 (01 Sep 2023 20:00), Max: 37.1 (01 Sep 2023 13:00)  T(F): 95 (01 Sep 2023 20:00), Max: 98.7 (01 Sep 2023 13:00)  HR: 67 (01 Sep 2023 22:30) (67 - 114)  BP: 100/74 (01 Sep 2023 18:00) (83/52 - 137/63)  BP(mean): 84 (01 Sep 2023 18:00) (61 - 103)  ABP: 98/66 (01 Sep 2023 22:30) (82/56 - 144/91)  ABP(mean): 78 (01 Sep 2023 22:30) (64 - 111)  RR: 19 (01 Sep 2023 22:30) (0 - 26)  SpO2: 100% (01 Sep 2023 22:30) (71% - 100%)    O2 Parameters below as of 01 Sep 2023 19:30      O2 Concentration (%): 50      Vital Signs Last 24 Hrs  T(C): 35 (01 Sep 2023 20:00), Max: 37.1 (01 Sep 2023 13:00)  T(F): 95 (01 Sep 2023 20:00), Max: 98.7 (01 Sep 2023 13:00)  HR: 67 (01 Sep 2023 22:30) (67 - 114)  BP: 100/74 (01 Sep 2023 18:00) (83/52 - 137/63)  BP(mean): 84 (01 Sep 2023 18:00) (61 - 103)  RR: 19 (01 Sep 2023 22:30) (0 - 26)  SpO2: 100% (01 Sep 2023 22:30) (71% - 100%)    Parameters below as of 01 Sep 2023 19:30      O2 Concentration (%): 50    ABG - ( 01 Sep 2023 10:31 )  pH, Arterial: 7.28  pH, Blood: x     /  pCO2: 42    /  pO2: 62    / HCO3: 20    / Base Excess: -7.0  /  SaO2: 90.1                I&O's Detail    31 Aug 2023 07:01  -  01 Sep 2023 07:00  --------------------------------------------------------  IN:    IV PiggyBack: 100 mL    IV PiggyBack: 200 mL    IV PiggyBack: 200 mL    Lactated Ringers: 1200 mL    Lactated Ringers Bolus: 1000 mL    Norepinephrine: 1103.3 mL    PRBCs (Packed Red Blood Cells): 336 mL    Propofol: 16.8 mL    Vital1.5: 20 mL  Total IN: 4176.1 mL    OUT:    Intermittent Catheterization - Urethral (mL): 505 mL    Vasopressin: 0 mL  Total OUT: 505 mL    Total NET: 3671.1 mL      01 Sep 2023 07:01  -  01 Sep 2023 23:54  --------------------------------------------------------  IN:    dextrose 5% + lactated ringers: 600 mL    Enteral Tube Flush: 25 mL    Free Water: 50 mL    IV PiggyBack: 100 mL    IV PiggyBack: 100 mL    IV PiggyBack: 250 mL    Lactated Ringers: 300 mL    Nepro with Carb Steady: 70 mL    Norepinephrine: 409 mL    Propofol: 21.6 mL  Total IN: 1925.6 mL    OUT:    Indwelling Catheter - Urethral (mL): 100 mL    Intermittent Catheterization - Urethral (mL): 525 mL    Oral Fluid: 0 mL  Total OUT: 625 mL    Total NET: 1300.6 mL            LABS:                        10.4   1.48  )-----------( 45       ( 01 Sep 2023 07:07 )             30.7     09-01    146<H>  |  120<H>  |  49<H>  ----------------------------<  152<H>  3.3<L>   |  21<L>  |  0.72    Ca    5.8<LL>      01 Sep 2023 22:00  Phos  4.3     09-01  Mg     1.9     09-01    TPro  4.1<L>  /  Alb  2.2<L>  /  TBili  1.5<H>  /  DBili  x   /  AST  234<H>  /  ALT  248<H>  /  AlkPhos  90  09-01          CAPILLARY BLOOD GLUCOSE      POCT Blood Glucose.: 128 mg/dL (01 Sep 2023 21:19)    PT/INR - ( 31 Aug 2023 13:05 )   PT: 14.8 sec;   INR: 1.27 ratio         PTT - ( 31 Aug 2023 13:05 )  PTT:43.2 sec  Urinalysis Basic - ( 01 Sep 2023 22:00 )    Color: x / Appearance: x / SG: x / pH: x  Gluc: 152 mg/dL / Ketone: x  / Bili: x / Urobili: x   Blood: x / Protein: x / Nitrite: x   Leuk Esterase: x / RBC: x / WBC x   Sq Epi: x / Non Sq Epi: x / Bacteria: x      CULTURES:  Culture Results:   Growth in anaerobic bottle: Gram Positive Cocci in Pairs and Chains  Growth in aerobic bottle: Gram Positive Cocci in Pairs and Chains  Direct identification is available within approximately 3-5  hours either by Blood Panel Multiplexed PCR or Direct  MALDI-TOF. Details: [email or web address] (08-31 @ 13:20)  Culture Results:   No growth at 24 hours (08-31 @ 13:15)  Culture Results:   <10,000 CFU/mL Normal Urogenital Micki (08-27 @ 13:20)      Physical Examination:    General: Adult female in bed, ill apeparing, thin, frail     HEENT: Pupils equal, reactive to light.  Symmetric.    PULM: b/l air entry     CVS: +s1/s2    ABD: Soft,     EXT: No edema    SKIN: Warm     Neuro: sedated     RADIOLOGY:   < from: Xray Chest 1 View- PORTABLE-Urgent (Xray Chest 1 View- PORTABLE-Urgent .) (09.01.23 @ 14:22) >  INTERPRETATION:  AP erect chest on September 1, 2023 at 2:12 PM. Patient   is    Heart size normal.    NG tube is similar positionto August 1    There is a persistent small left base effusion.    There is a right curve thoracolumbar junction old right rib fracture.    Chest is similar.    IMPRESSION: Stable findings as above.    < end of copied text >      Critical Care time: 35 mins assessing presenting problems of acute illness that poses high probability of life threatening deterioration or end organ damage/dysfunction.  Medical decision making inculding Initiating plan of care, reviewing data, reviewing radiology,direct patient bedside evaluation and interpretation of vital signs, any necessary ventilator management , discusion with multidisciplinary team, discussing goals of care with patient/family, all non inclusive of procedures

## 2023-09-02 NOTE — PROGRESS NOTE ADULT - ASSESSMENT
54yo F with PMH anemia, hypothyroidism, osteoporosis, chronic hyponatremia, depression, anxiety presented to ED on 8/27/23 due to weakness and hypoglycemia. Admitted to medicine for weakness and fall, pancytopenia.    1) Acute respiratory failure with hypoxia.   -Patient s/p RRT on 8/28 for unresponsiveness and hypoglycemic episode- intubated for airway protection and transferred to ICU  -Weaned off vent to NC- monitored resp status closely- went into respiratory distress again and was re-intubated  -getting CPAP trials   -plan per ICU.    2) Septic Shock  - Likely due to pneumonia  - Management per ICU: currently on Levophed and Cefepime     3) Pancytopenia   - History of leukopenia and anemia, now worsened likely due to FTT / Sepsis   - s/p 3 PRBCs and 1 Platelets   - Hem consult noted     4) Hypoglycemia.   -likely due to poor PO intake + sepsis   -Monitor closely     5) Electrolyte imbalance.   - Correction per ICU  - Renal recs noted     6) Weakness.   - Patient presented due to weakness, found to be hypoglycemic by EMS to 48, hyperglycemic on arrival. recent fall on 8/23 leading to broken wrist. concern for malnutrition vs gait disturbance vs deconditioning    7) Left Radius Fracture  - NWB LUE in splint  - Ortho consult noted     8) Hypothyroidism.   - TSH elevated  - Free T4 normal   - Management per ICU    9) Transaminitis.   - Likely due to shock liver   - Monitor     10) History of behavioral and mental health problems.   - behavioral health consulted, recorded history of schizoid personality disorder, anorexia, and hospitalization over 20 years ago.    DVT Prophylaxis -- No need for chemical prophylaxis due to anemia / thrombocytopenia.    Management per ICU.

## 2023-09-02 NOTE — CHART NOTE - NSCHARTNOTEFT_GEN_A_CORE
Removed arterial line and central line this evening  site cleaned, pt laying supine  arterial line removed, tip intact, pressure applied for 15 mins, hemostasis achieved  central line tlc removed, tip intact, pressure applied for 15 more minutes, tip intact, hemostasis achieved  Hemostasis achieved with no sig oozing at sites   Noted ecchymosis around area at site where pressure was held (pt profoundly thrombocytopenic with diffuse petechiae covering body) but no discernable hematoma formation, site soft  site cleaned again, pressure dressing applied  RN to call with any worsening ecchymosis or swelling of area   monitor site closely in light of profound thrombocytopenia Removed arterial line and central line this evening  site cleaned, pt laying supine  arterial line removed, tip intact, pressure applied for 15 mins, hemostasis achieved  central line tlc removed, tip intact, pressure applied for 15 more minutes, tip intact, hemostasis achieved  Hemostasis achieved with no sig oozing at sites   Noted ecchymosis around area at site where pressure was held (pt profoundly thrombocytopenic with diffuse petechiae covering body) but no discernable hematoma formation, site soft  site cleaned again, pressure dressing applied  L DP pulse dopplerable   RN to call with any worsening ecchymosis or swelling of area   monitor site closely in light of profound thrombocytopenia

## 2023-09-02 NOTE — PROGRESS NOTE ADULT - SUBJECTIVE AND OBJECTIVE BOX
INTERVAL HPI/OVERNIGHT EVENTS: No acute overnight events occurred.    SUBJECTIVE: Seen and examined pt at bedside. Sedated and intubated.     Review of Systems: Unable to obtain - patient is intubated     ICU Vital Signs Last 24 Hrs  T(C): 35.5 (02 Sep 2023 08:16), Max: 37.1 (01 Sep 2023 13:00)  T(F): 95.9 (02 Sep 2023 08:16), Max: 98.7 (01 Sep 2023 13:00)  HR: 74 (02 Sep 2023 10:30) (62 - 90)  BP: 100/74 (01 Sep 2023 18:00) (100/74 - 128/86)  BP(mean): 84 (01 Sep 2023 18:00) (84 - 103)  ABP: 104/66 (02 Sep 2023 10:30) (84/57 - 144/91)  ABP(mean): 81 (02 Sep 2023 10:30) (66 - 111)  RR: 14 (02 Sep 2023 10:30) (0 - 32)  SpO2: 96% (02 Sep 2023 10:30) (76% - 100%)    O2 Parameters below as of 02 Sep 2023 10:00  Patient On (Oxygen Delivery Method): ventilator, CPAP    O2 Concentration (%): 30        Mode: AC/ CMV (Assist Control/ Continuous Mandatory Ventilation), RR (machine): 16, TV (machine): 300, FiO2: 50, PEEP: 5  09-01-23 @ 07:01  -  09-02-23 @ 07:00  --------------------------------------------------------  IN: 2981.4 mL / OUT: 1325 mL / NET: 1656.4 mL        CAPILLARY BLOOD GLUCOSE      POCT Blood Glucose.: 169 mg/dL (02 Sep 2023 08:52)      I&O's Summary    01 Sep 2023 07:01  -  02 Sep 2023 07:00  --------------------------------------------------------  IN: 2981.4 mL / OUT: 1325 mL / NET: 1656.4 mL        PHYSICAL EXAM:  Gen: emaciated female  Neuro: sedated  HEENT: intubated  Resp: good air entry b/l  Cardio: rrr  Abd: soft, nt, distension improved  Ext: scattered ecchmyoses, LUE with splint in place  Skin: as above, warm, dry      Meds:  cefepime   IVPB IV Intermittent    norepinephrine Infusion IV Continuous    dextrose 50% Injectable IV Push  dextrose 50% Injectable IV Push  dextrose 50% Injectable IV Push  dextrose Oral Gel Oral  glucagon  Injectable IntraMuscular      fentaNYL    Injectable IV Push PRN  propofol Infusion IV Continuous            dextrose 5% + lactated ringers. IV Continuous  multivitamin/minerals/iron Oral Solution (CENTRUM) Oral  thiamine IVPB IV Intermittent      chlorhexidine 0.12% Liquid Oral Mucosa  chlorhexidine 2% Cloths Topical                              6.9    1.25  )-----------( 12       ( 02 Sep 2023 09:45 )             19.9       09-02    149<H>  |  123<H>  |  49<H>  ----------------------------<  193<H>  3.9   |  20<L>  |  0.66    Ca    6.2<LL>      02 Sep 2023 09:45  Phos  3.9     09-02  Mg     1.9     09-02    TPro  3.9<L>  /  Alb  2.0<L>  /  TBili  1.7<H>  /  DBili  x   /  AST  182<H>  /  ALT  173<H>  /  AlkPhos  93  09-02          PT/INR - ( 02 Sep 2023 10:10 )   PT: 15.8 sec;   INR: 1.36 ratio         PTT - ( 02 Sep 2023 10:10 )  PTT:41.6 sec  Urinalysis Basic - ( 02 Sep 2023 09:45 )    Color: x / Appearance: x / SG: x / pH: x  Gluc: 193 mg/dL / Ketone: x  / Bili: x / Urobili: x   Blood: x / Protein: x / Nitrite: x   Leuk Esterase: x / RBC: x / WBC x   Sq Epi: x / Non Sq Epi: x / Bacteria: x      ET Tube ET Tube --   Numerous polymorphonuclear leukocytes per low power field  No Squamous epithelial cells per low power field  Numerous Gram Negative Rods per oil power field  Numerous Gram positive cocci in pairs per oil power field  Few Gram Positive Rods per oil power field 09-01 @ 09:19  .Blood Blood-Peripheral   Growth in anaerobic bottle: Gram Positive Cocci in Pairs and Chains  Growth in aerobic bottle: Gram Positive Cocci in Pairs and Chains  Direct identification is available within approximately 3-5  hours either by Blood Panel Multiplexed PCR or Direct  MALDI-TOF. Details: [email or web address]   Growth in anaerobic bottle: Gram Positive Cocci in Pairs and Chains  Growth in aerobic bottle: Gram Positive Cocci in Pairs and Chains 08-31 @ 13:20  .Blood Blood-Peripheral   No growth at 24 hours -- 08-31 @ 13:15              Radiology: no new imaging    Bedside Ultrasound: n/a    Tubes/Lines: PIV, L femoral central line, L fem A line, ET tube, NGT      GLOBAL ISSUE/BEST PRACTICE:  Analgesia: N   Sedation: Y  HOB elevation: Y  Stress ulcer prophylaxis: Y   VTE prophylaxis: Y  Glycemic control: N  Nutrition: diet NPO with tube feeds    CODE STATUS:  FULL CODE

## 2023-09-02 NOTE — PROGRESS NOTE ADULT - SUBJECTIVE AND OBJECTIVE BOX
Failure to thrive in adult    HPI:  54yo F with PMH anemia, hypothyroidism, osteoporosis, chronic hyponatremia, depression, anxiety presents to ED on 8/27/23 due to weakness and hypoglycemia.  Patient was seen at this emergency room 8/24 ago for a fall at physical therapy 8/23.  Was diagnosed with left wrist fracture at that time.  Splint was applied and seen by orthopedics.  Recommended admission due to fall risk and unsafe discharge.  Patient refused admission and left AMA.  Patient was fully aware of  fall risk and all lab abnormalities.  States she wanted to follow-up outpatient.  Today patient became weak and aide assisted her down to the floor.  Denies hitting head or any injuries from the fall because aide assisted her.  EMS was called.  Sugar on arrival per EMS was 48 and given D10.  Sugar on arrival to emergency room was 171.  Patient denies any pain or complaints.  Denies any injuries from the fall.  At this time patient is refusing any treatment or test.  States she just wants to go home and eat.  States her sugar was low because she did not eat today but she says she will eat when she gets home. Patient removed splint that was placed 2 days ago and was not wearing splint today per EMS. Patient's sister reports that she has been getting worse for the last 6 months and has become increasingly more frail. Patient lives alone with aides as per sister. Patient has ritualistic eating habits and a diet which includes eggs, fish, and vegetables. She currently takes no medications.     Denies fever, chills, chest pain, palpitations, SOB, cough, abdominal pain, nausea, vomiting, diarrhea, constipation, urinary frequency, urgency, or dysuria, headaches, changes in vision, dizziness, numbness, tingling.  Denies recent travel, recent antibiotic use, or sick contacts. (27 Aug 2023 17:14)    Interval History:  Patient was seen and examined at bedside around 11 am.  Off sedation.   Was on CPAP.   Pressors were decreased this morning.   Per RN, doing well. No overnight issues.     ROS:  As per interval history otherwise unremarkable.    PHYSICAL EXAM:  Vital Signs  T(C): 35.7 (02 Sep 2023 12:16), Max: 35.7 (02 Sep 2023 12:16)  T(F): 96.2 (02 Sep 2023 12:16), Max: 96.2 (02 Sep 2023 12:16)  HR: 80 (02 Sep 2023 16:09) (62 - 90)  BP: 100/74 (01 Sep 2023 18:00) (100/74 - 105/73)  BP(mean): 84 (01 Sep 2023 18:00) (84 - 86)  RR: 11 (02 Sep 2023 15:00) (8 - 32)  SpO2: 97% (02 Sep 2023 16:09) (78% - 100%)  Parameters below as of 02 Sep 2023 11:00  Patient On (Oxygen Delivery Method): ventilator, CPAP  O2 Concentration (%): 30  General: Middle aged female lying in bed comfortably. No acute distress  HEENT: Clear conjunctivae. Dry mucus membrane. Intubated.   Chest: Good air entry. No wheezing, rales or rhonchi.   Heart: Normal S1 & S2. RRR.   Abdomen: Non distended. Soft. Non-tender. + BS  : Levy's catheter in place.   Ext: RLE: No pedal edema. LLE: 1+ pedal edema.   Neuro: Sleeping   Skin: Warm and Dry  Psychiatry: Unable to assess     I&O's Summary    01 Sep 2023 07:01  -  02 Sep 2023 07:00  --------------------------------------------------------  IN: 2981.4 mL / OUT: 1325 mL / NET: 1656.4 mL    02 Sep 2023 07:01  -  02 Sep 2023 16:41  --------------------------------------------------------  IN: 1615.8 mL / OUT: 575 mL / NET: 1040.8 mL    LABS:  CAPILLARY BLOOD GLUCOSE  POCT Blood Glucose.: 219 mg/dL (02 Sep 2023 15:53)  POCT Blood Glucose.: 196 mg/dL (02 Sep 2023 12:38)  POCT Blood Glucose.: 169 mg/dL (02 Sep 2023 08:52)  POCT Blood Glucose.: 139 mg/dL (02 Sep 2023 04:42)  POCT Blood Glucose.: 159 mg/dL (02 Sep 2023 01:45)  POCT Blood Glucose.: 128 mg/dL (01 Sep 2023 21:19)                      6.9    1.25  )-----------( 12       ( 02 Sep 2023 09:45 )             19.9     09-02    149<H>  |  123<H>  |  49<H>  ----------------------------<  193<H>  3.9   |  20<L>  |  0.66    Ca    6.2<LL>      02 Sep 2023 09:45  Phos  3.9     09-02  Mg     1.9     09-02    TPro  3.9<L>  /  Alb  2.0<L>  /  TBili  1.7<H>  /  DBili  x   /  AST  182<H>  /  ALT  173<H>  /  AlkPhos  93  09-02    PT/INR - ( 02 Sep 2023 10:10 )   PT: 15.8 sec;   INR: 1.36 ratio      PTT - ( 02 Sep 2023 10:10 )  PTT:41.6 sec  Urinalysis Basic - ( 02 Sep 2023 09:45 )    Color: x / Appearance: x / SG: x / pH: x  Gluc: 193 mg/dL / Ketone: x  / Bili: x / Urobili: x   Blood: x / Protein: x / Nitrite: x   Leuk Esterase: x / RBC: x / WBC x   Sq Epi: x / Non Sq Epi: x / Bacteria: x    RADIOLOGY & ADDITIONAL STUDIES:  Reviewed     MEDICATIONS  (STANDING):  cefepime   IVPB 2000 milliGRAM(s) IV Intermittent every 12 hours  chlorhexidine 0.12% Liquid 15 milliLiter(s) Oral Mucosa every 12 hours  chlorhexidine 2% Cloths 1 Application(s) Topical <User Schedule>  dextrose 5% + lactated ringers. 1000 milliLiter(s) (60 mL/Hr) IV Continuous <Continuous>  dextrose 50% Injectable 25 Gram(s) IV Push once  dextrose 50% Injectable 25 Gram(s) IV Push once  dextrose 50% Injectable 12.5 Gram(s) IV Push once  dextrose Oral Gel 15 Gram(s) Oral once  glucagon  Injectable 1 milliGRAM(s) IntraMuscular once  multivitamin/minerals/iron Oral Solution (CENTRUM) 15 milliLiter(s) Oral daily  norepinephrine Infusion 0.05 MICROgram(s)/kG/Min (2.5 mL/Hr) IV Continuous <Continuous>  propofol Infusion 20 MICROgram(s)/kG/Min (3.2 mL/Hr) IV Continuous <Continuous>  thiamine IVPB 200 milliGRAM(s) IV Intermittent daily    MEDICATIONS  (PRN):  fentaNYL    Injectable 25 MICROGram(s) IV Push every 4 hours PRN vent synnchrony

## 2023-09-02 NOTE — PROCEDURE NOTE - ADDITIONAL PROCEDURE DETAILS
Ultrasound-Guided 20GIV inserted into the R EJ. Good flash, flushes easily. Bed lowered, and sharps were disposed. Pt tolerated procedure well. 20GIV inserted into the R EJ. Good flash, flushes easily. Bed lowered, and sharps were disposed. Pt tolerated procedure well.

## 2023-09-02 NOTE — PROGRESS NOTE ADULT - ASSESSMENT
MYRNA  Hypernatremia s/p hyponatremia  Hypokalemia/hyperkalemia   Hypothyroidism  FTT  Anemia   Acute respiratory failure   Bradycardia  Hyperphosphatemia     -MYRNA likely from hypoglycemic event, improving  -Suspect low solute in take, tea and toast physiology  -Urine indices reviewed  -PRBCs per primary  -TSH elevated, check T4, could also cause hyponatremia  -Deemed not to have capacity by psych  -Sodium improved but now elevated  -Agree with LR  -FW with tube feeds; consider 200cc q 8 hrs for now  -Monitor for refeeding when using GI tract; daily phos level  -Hyperchloremic acidosis, mild - monitor for now  -Repeat labs pending, will follow up  -Vent management per ICU   MYRNA  Hypernatremia s/p hyponatremia  Hypokalemia/hyperkalemia   Hypothyroidism  FTT  Anemia   Acute respiratory failure   Bradycardia  Hyperphosphatemia     -MYRNA likely from hypoglycemic event, improving  -Suspect low solute in take, tea and toast physiology  -Urine indices reviewed  -PRBCs per primary  -TSH elevated, check T4, could also cause hyponatremia  -Deemed not to have capacity by psych  -Sodium improved but now elevated  -Agree with LR; continue for now  -FW with tube feeds; consider 200cc q 8 hrs for now  -Monitor for refeeding when using GI tract; daily phos level  -Hyperchloremic acidosis, mild - monitor for now  -Mir erinn 7.2; s/p replacement per ICU  -Repeat labs pending, will follow up; electrolytes replacements per ICU  -Vent management per ICU

## 2023-09-02 NOTE — PROGRESS NOTE ADULT - ASSESSMENT
52 y/o F w/ pmh of anorexia, severe malnutrition, hypothyroid, OP, hx of multiple fx, chronic hypoNA, FTT, presented to admitted to [facility] for weakness and hypoglycemia, pt w/  RRT on the floor after pt was found hypoglycemic w/ an FS of 16 and thelma-arrest. Pt was extubated on and reintubated on 08/31 for resp failure. Now remains in the MICU for metabolic encephalopathy, acute hypoxic resp failure, anemia, thrombocytopenia, hypoK/hypoCa.        -Neuro: Intubated and now off sedation  -Cardiac: Septic shock now improved and off pressors, maintain MAP>65  -Resp: Acute Hypoxic resp failure cont lung protective ventilation, tolerated PS trail all day, will placed pt back on full support overnight, will titrate vent setting as tolerated to maintain o2 >90%  -GI: cont TF as ordered/tolerated  -Renal: Renal function stable cont to monitor along w/ lytes, HyperNA cont free water, HypoK order additional KCL, hypocalcemia ordered additional calcium gluconate  -Endo: Hypoglycemia/hyperglycemia cont to monitor FS closely  -ID: Pna on IVAB  -Heme: DVT ppx w/ SCD  -Dispo: Pt remains in the MICU, currently full code, GOC ongoing, dispo pending ICU course

## 2023-09-02 NOTE — PROGRESS NOTE ADULT - SUBJECTIVE AND OBJECTIVE BOX
HPI: 54 y/o F w/ pmh of anorexia, severe malnutrition, hypothyroid, OP, hx of multiple fx, chronic hypoNA, FTT, presented to admitted to [facility] for weakness and hypoglycemia, pt w/  RRT on the floor after pt was found hypoglycemic w/ an FS of 16 and thelma-arrest. Pt was extubated on and reintubated on 08/31 for resp failure. Now remains in the MICU for metabolic encephalopathy, acute hypoxic resp failure, anemia, thrombocytopenia, hypoK/hypoCa.    24 hour events: tonight repeat labs performed pt s/p 1U of PRBC/Plts w/ improvement in both. Also found to have HypoK and hypocalcemia.    Review of Systems: pt Intubated     T(F): 94.5 (09-02-23 @ 20:08), Max: 96.2 (09-02-23 @ 12:16)  HR: 83 (09-02-23 @ 20:33) (62 - 94)  BP: 111/75 (09-02-23 @ 19:30) (105/76 - 116/78)  RR: 12 (09-02-23 @ 19:30) (8 - 32)  SpO2: 93% (09-02-23 @ 20:33) (78% - 100%)  Wt(kg): --    Mode: AC/ CMV (Assist Control/ Continuous Mandatory Ventilation), RR (machine): 16, TV (machine): 300, FiO2: 30, PEEP: 5  09-01-23 @ 07:01  -  09-02-23 @ 07:00  --------------------------------------------------------  IN: 2981.4 mL / OUT: 1325 mL / NET: 1656.4 mL    09-02-23 @ 07:01  -  09-02-23 @ 20:54  --------------------------------------------------------  IN: 1865.8 mL / OUT: 915 mL / NET: 950.8 mL        CAPILLARY BLOOD GLUCOSE      POCT Blood Glucose.: 227 mg/dL (02 Sep 2023 17:10)      I&O's Summary    01 Sep 2023 07:01  -  02 Sep 2023 07:00  --------------------------------------------------------  IN: 2981.4 mL / OUT: 1325 mL / NET: 1656.4 mL    02 Sep 2023 07:01  -  02 Sep 2023 20:54  --------------------------------------------------------  IN: 1865.8 mL / OUT: 915 mL / NET: 950.8 mL        Physical Exam:   Gen: chronically ill appearing and cachexia  Neuro: intubated  HEENT: NC/AT  Resp: good air entry b/l  CVS: +RRR  Abd: Abd soft, ND  Ext: no edema   Skin: warm/dry    Meds:  cefepime   IVPB IV Intermittent    norepinephrine Infusion IV Continuous    dextrose 50% Injectable IV Push  dextrose 50% Injectable IV Push  dextrose 50% Injectable IV Push  dextrose Oral Gel Oral  glucagon  Injectable IntraMuscular      fentaNYL    Injectable IV Push PRN  propofol Infusion IV Continuous            multivitamin/minerals/iron Oral Solution (CENTRUM) Oral  thiamine IVPB IV Intermittent      chlorhexidine 0.12% Liquid Oral Mucosa  chlorhexidine 2% Cloths Topical                              10.2   2.45  )-----------( 89       ( 02 Sep 2023 18:25 )             30.3       09-02    149<H>  |  123<H>  |  49<H>  ----------------------------<  193<H>  3.9   |  20<L>  |  0.66    Ca    6.2<LL>      02 Sep 2023 09:45  Phos  3.3     09-02  Mg     2.2     09-02    TPro  3.9<L>  /  Alb  2.0<L>  /  TBili  1.7<H>  /  DBili  x   /  AST  182<H>  /  ALT  173<H>  /  AlkPhos  93  09-02          PT/INR - ( 02 Sep 2023 10:10 )   PT: 15.8 sec;   INR: 1.36 ratio         PTT - ( 02 Sep 2023 10:10 )  PTT:41.6 sec  Urinalysis Basic - ( 02 Sep 2023 09:45 )    Color: x / Appearance: x / SG: x / pH: x  Gluc: 193 mg/dL / Ketone: x  / Bili: x / Urobili: x   Blood: x / Protein: x / Nitrite: x   Leuk Esterase: x / RBC: x / WBC x   Sq Epi: x / Non Sq Epi: x / Bacteria: x      ET Tube ET Tube   Mixed gram negative rods including  Moderate Stenotrophomonas maltophilia   Numerous polymorphonuclear leukocytes per low power field  No Squamous epithelial cells per low power field  Numerous Gram Negative Rods per oil power field  Numerous Gram positive cocci in pairs per oil power field  Few Gram Positive Rods per oil power field 09-01 @ 09:19  .Blood Blood-Peripheral   Growth in aerobic and anaerobic bottles: Streptococcus mitis/oralis group  Alpha hemolytic streptoccous (Not Streptococcus pneumoniae or  Enterococcus)  isolated from a single blood culture set may represent contamination.  Contact the MicrobiologyDepartment at [phone] if susceptibility  testing is clinically indicated.  Direct identification is available within approximately 3-5  hours either by Blood Panel Multiplexed PCR or Direct  MALDI-TOF. Details: [email or web address]   Growth in anaerobic bottle: Gram Positive Cocci in Pairs and Chains  Growth in aerobic bottle: Gram Positive Cocci in Pairs and Chains 08-31 @ 13:20  .Blood Blood-Peripheral   No growth at 48 Hours -- 08-31 @ 13:15              Radiology:    < from: Xray Chest 1 View- PORTABLE-Urgent (Xray Chest 1 View- PORTABLE-Urgent .) (09.01.23 @ 14:22) >  ACC: [id number] EXAM:  XR CHEST PORTABLE URGENT 1V   ORDERED BY: JEANNIE ORTEGA     PROCEDURE DATE:  09/01/2023          INTERPRETATION:  AP erect chest on September 1, 2023 at 2:12 PM. Patient   is    Heart size normal.    NG tube is similar positionto August 1    There is a persistent small left base effusion.    There is a right curve thoracolumbar junction old right rib fracture.    Chest is similar.    IMPRESSION: Stable findings as above.    --- End of Report ---      BERENICE TREVINO MD; Attending Radiologist  This document has been electronically signed. Sep  1 2023  3:31PM    < end of copied text >        CODE STATUS: FULL CODE    CRITICAL CARE TIME SPENT: 38 mins  (Assessing presenting problems of acute illness, which pose high probability of life threatening deterioration or end organ damage/dysfunction, as well as medical decision making including initiating plan of care, reviewing data, reviewing radiologic exams, discussing with multidisciplinary team,  discussing goals of care with patient/family, and writing this note.  Non-inclusive of procedures performed)

## 2023-09-02 NOTE — PROGRESS NOTE ADULT - ASSESSMENT
52 y/o F pmhx of anemia, osteoporosis, depression, anxiety, chronic hyponatremia, w/ multiple admissions for malnutrition failure to thrive presented with weakness and hypoglycemia. Patient admitted to MICU with metabolic encephalopathy and acute hypoxic respiratory failure requiring re-intubation.    Neuro: metabolic encephalopathy likely secondary to severe malnutrition   --attempt to dc propofol  Pulm: AHRF requiring reintubation on AC/CMV at 16/300/5/50%  --wean as tolerated  Cardio: septic shock likely from pna, requiring pressor support with levophed, downtitrate levophed as able to maintain map > 65. cont D5LR mIVF   GI: abd distension improved, AXR 9/1/23 without evidence of bowel obstruction. cont diet NPO with tube feeds, inc to 20ml / hr today  --chronic malnutrition: hypoalbuminemia   --elevated lfts, likely in setting of shock   --elevated tbili-> downtrending  Renal: likely MYRNA given patients baseline Cr is low. cont mIVF with D5LR  --monitor UOP w/indwelling catheter, making good UOP overnight  --electrolytes reviewed from AM, replete PRN  Endo: with hypoglycemic episodes, blood glu levels improved. monitor blood glucose q4hrs. cont mIVF w/ D5  --hypocalcemia, hyperparathyroidism, will hold off on Ca++ supp  MSK: 8/24 L distal radius fx: comminuted, relatively non-displaced   --seen by ortho, splint placed, confirmed by xray  Heme: anemic, s/p 1u pRBC, SCDs for vte ppx for now  --AM cbc reviewed with repeated cbc, downtrending hg < 7 and PLT ~15. will give 1u PRBC and 1u PLT now  ID: likely septic shock in setting of pna, cont cefepime/flagyl. culture data reviewed: strep sp in 1 bottle blood cx. repeat blood cx 2 bottles in am. sputum cx reviewed.  Skin: L femoral central and A lines, endotracheal tube, ngt  Dispo: full code

## 2023-09-02 NOTE — PROGRESS NOTE ADULT - SUBJECTIVE AND OBJECTIVE BOX
Patient is a 53y old  Female who presents with a chief complaint of fall, pancytopenia (27 Aug 2023 17:14)       HPI:  52yo F with PMH anemia, hypothyroidism, osteoporosis, chronic hyponatremia, depression, anxiety presents to ED on 8/27/23 due to weakness and hypoglycemia.  Patient was seen at this emergency room 8/24 ago for a fall at physical therapy 8/23.  Was diagnosed with left wrist fracture at that time.  Splint was applied and seen by orthopedics.  Recommended admission due to fall risk and unsafe discharge.  Patient refused admission and left AMA.  Patient was fully aware of  fall risk and all lab abnormalities.  States she wanted to follow-up outpatient.  Today patient became weak and aide assisted her down to the floor.  Denies hitting head or any injuries from the fall because aide assisted her.  EMS was called.  Sugar on arrival per EMS was 48 and given D10.  Sugar on arrival to emergency room was 171.  Patient denies any pain or complaints.  Denies any injuries from the fall.  At this time patient is refusing any treatment or test.  States she just wants to go home and eat.  States her sugar was low because she did not eat today but she says she will eat when she gets home. Patient removed splint that was placed 2 days ago and was not wearing splint today per EMS. Patient's sister reports that she has been getting worse for the last 6 months and has become increasingly more frail. Patient lives alone with aides as per sister. Patient has ritualistic eating habits and a diet which includes eggs, fish, and vegetables. She currently takes no medications.   Renal consulted for multiple electrolyte abnormalities.  She states she wants to urinate.  Denies any N/V/SOB.  Eating. States she had edema.     No acute events noted  Intubated       PAST MEDICAL & SURGICAL HISTORY:  Anemia      Depression      Chronic musculoskeletal pain      Anorexia  at age 15 yrs      No significant past surgical history           FAMILY HISTORY:  No pertinent family history in first degree relatives    [place]    Social History:Non smoker    MEDICATIONS  (STANDING):  cefepime   IVPB 2000 milliGRAM(s) IV Intermittent every 12 hours  chlorhexidine 0.12% Liquid 15 milliLiter(s) Oral Mucosa every 12 hours  chlorhexidine 2% Cloths 1 Application(s) Topical <User Schedule>  cyanocobalamin 1000 MICROGram(s) Oral daily  dextrose 5% + lactated ringers. 1000 milliLiter(s) (60 mL/Hr) IV Continuous <Continuous>  dextrose 50% Injectable 12.5 Gram(s) IV Push once  dextrose 50% Injectable 25 Gram(s) IV Push once  dextrose 50% Injectable 25 Gram(s) IV Push once  dextrose Oral Gel 15 Gram(s) Oral once  famotidine Injectable 20 milliGRAM(s) IV Push two times a day  glucagon  Injectable 1 milliGRAM(s) IntraMuscular once  lactated ringers. 1000 milliLiter(s) (60 mL/Hr) IV Continuous <Continuous>  multivitamin/minerals/iron Oral Solution (CENTRUM) 15 milliLiter(s) Oral daily  norepinephrine Infusion 0.05 MICROgram(s)/kG/Min (2.5 mL/Hr) IV Continuous <Continuous>  propofol Infusion 20 MICROgram(s)/kG/Min (3.2 mL/Hr) IV Continuous <Continuous>  thiamine IVPB 200 milliGRAM(s) IV Intermittent daily    MEDICATIONS  (PRN):  fentaNYL    Injectable 25 MICROGram(s) IV Push every 4 hours PRN vent synnchrony      Allergies    No Known Allergies    Intolerances         REVIEW OF SYSTEMS:  Intubated    ICU Vital Signs Last 24 Hrs  T(C): 34.8 (02 Sep 2023 04:07), Max: 37.1 (01 Sep 2023 13:00)  T(F): 94.6 (02 Sep 2023 04:07), Max: 98.7 (01 Sep 2023 13:00)  HR: 81 (02 Sep 2023 05:56) (62 - 93)  BP: 100/74 (01 Sep 2023 18:00) (88/55 - 137/63)  BP(mean): 84 (01 Sep 2023 18:00) (67 - 103)  ABP: 93/60 (02 Sep 2023 04:30) (83/58 - 144/91)  ABP(mean): 73 (02 Sep 2023 04:30) (66 - 111)  RR: 21 (02 Sep 2023 04:30) (0 - 32)  SpO2: 100% (02 Sep 2023 04:30) (71% - 100%)    O2 Parameters below as of 01 Sep 2023 19:30      O2 Concentration (%): 50          PHYSICAL EXAM:    GENERAL: cachectic, frail appearing; intubated  HEAD:  Atraumatic, Normocephalic  NERVOUS SYSTEM:  sedated  CHEST/LUNG: Clear to percussion bilaterally; No rales, rhonchi, wheezing, or rubs  HEART: Regular rate and rhythm; No murmurs, rubs, or gallops  EXTREMITIES:  No Edema  SKIN: + Ecchoymosis LE      LABS:               9/2/23: pending                                       10.4   1.48  )-----------( 45       ( 01 Sep 2023 07:07 )             30.7     09-01    146<H>  |  120<H>  |  49<H>  ----------------------------<  152<H>  3.3<L>   |  21<L>  |  0.72    Ca    5.8<LL>      01 Sep 2023 22:00  Phos  4.3     09-01  Mg     1.9     09-01    TPro  4.1<L>  /  Alb  2.2<L>  /  TBili  1.5<H>  /  DBili  x   /  AST  234<H>  /  ALT  248<H>  /  AlkPhos  90  09-01    PT/INR - ( 31 Aug 2023 13:05 )   PT: 14.8 sec;   INR: 1.27 ratio         PTT - ( 31 Aug 2023 13:05 )  PTT:43.2 sec  Urinalysis Basic - ( 01 Sep 2023 22:00 )    Color: x / Appearance: x / SG: x / pH: x  Gluc: 152 mg/dL / Ketone: x  / Bili: x / Urobili: x   Blood: x / Protein: x / Nitrite: x   Leuk Esterase: x / RBC: x / WBC x   Sq Epi: x / Non Sq Epi: x / Bacteria: x        ABG - ( 01 Sep 2023 10:31 )  pH, Arterial: 7.28  pH, Blood: x     /  pCO2: 42    /  pO2: 62    / HCO3: 20    / Base Excess: -7.0  /  SaO2: 90.1

## 2023-09-03 NOTE — CONSULT NOTE ADULT - SUBJECTIVE AND OBJECTIVE BOX
[facility]  INFECTIOUS DISEASES   [address] [place] [address]  Tel: [phone]     Fax: [phone]  ========================================================  MD Roldan Delong Kaushal, MD Cho, Michelle, MD Sunjit, Jaspal, MD  ========================================================    [id number]  KYLE NAJERA     CC:  fall, pancytopenia    HPI:  54yo woman with PMH of anorexia nervosa for more than 30yers, FTT with low BMP, hypothyroidism, osteoporosis, chronic hyponatremia was admitted on 8/27 after a fall, found to be pancytopenic.   She also had hypoglycemia. Patient's sister is in the bedside and reports that she has been getting worse for the last 6 months and has become increasingly more frail. Patient lives alone with aides.  On admission no fever, chills, chest pain, palpitations, SOB, cough, abdominal pain, nausea, vomiting, diarrhea, constipation, urinary frequency, urgency, or dysuria, headaches.  On 8/28 became hypoglycemic and unresponsive, so was intubated and was transferred to MICU. On 8/30 extubated but reintubated on 8/31 again.   Since then has worsening of pancytopenia and also, has developed pneumonia. ET tube culture showed Stenotrophomonas.   ID has been called for further recommendations.    PAST MEDICAL & SURGICAL HISTORY:  Anemia  Depression  Chronic musculoskeletal pain  Anorexia  at age 15 yrs  No significant past surgical history    Social Hx: No smoking, EtOH or drugs     FAMILY HISTORY:  No pertinent family history in first degree relatives    Allergies  No Known Allergies    Antibiotics:  MEDICATIONS  (STANDING):  chlorhexidine 0.12% Liquid 15 milliLiter(s) Oral Mucosa every 12 hours  chlorhexidine 2% Cloths 1 Application(s) Topical <User Schedule>  dextrose 50% Injectable 25 Gram(s) IV Push once  dextrose 50% Injectable 12.5 Gram(s) IV Push once  dextrose 50% Injectable 25 Gram(s) IV Push once  dextrose Oral Gel 15 Gram(s) Oral once  glucagon  Injectable 1 milliGRAM(s) IntraMuscular once  levoFLOXacin IVPB 750 milliGRAM(s) IV Intermittent once  levoFLOXacin IVPB      multivitamin/minerals/iron Oral Solution (CENTRUM) 15 milliLiter(s) Oral daily  potassium phosphate IVPB 15 milliMole(s) IV Intermittent once  thiamine IVPB 200 milliGRAM(s) IV Intermittent daily    REVIEW OF SYSTEMS:  Unable     Physical Exam:  Vital Signs Last 24 Hrs  T(C): 35.5 (03 Sep 2023 12:00), Max: 36.3 (03 Sep 2023 04:00)  T(F): 95.9 (03 Sep 2023 12:00), Max: 97.3 (03 Sep 2023 04:00)  HR: 81 (03 Sep 2023 12:00) (66 - 97)  BP: 119/81 (03 Sep 2023 09:00) (90/65 - 126/83)  BP(mean): 96 (03 Sep 2023 09:00) (73 - 99)  RR: 22 (03 Sep 2023 12:00) (9 - 25)  SpO2: 97% (03 Sep 2023 12:00) (93% - 100%)  Parameters below as of 03 Sep 2023 12:00  Patient On (Oxygen Delivery Method): ventilator  O2 Concentration (%): 30  GEN: NAD, intubated, frail and cachectic   HEENT: normocephalic and atraumatic.   NECK: Supple.  No lymphadenopathy   LUNGS: Coarse breath sounds   HEART: Regular rate and rhythm   ABDOMEN: Soft, nondistended.  Positive bowel sounds.    EXTREMITIES: trace edema  NEUROLOGIC: unable  SKIN: No rash     Labs:  09-03    149<H>  |  123<H>  |  50<H>  ----------------------------<  123<H>  3.6   |  20<L>  |  0.72    Ca    7.0<L>      03 Sep 2023 09:55  Phos  2.4     09-03  Mg     2.1     09-03    TPro  4.7<L>  /  Alb  2.0<L>  /  TBili  0.9  /  DBili  x   /  AST  121<H>  /  ALT  139<H>  /  AlkPhos  105  09-03                        10.6   1.16  )-----------( 68       ( 03 Sep 2023 09:55 )             31.6     PT/INR - ( 02 Sep 2023 10:10 )   PT: 15.8 sec;   INR: 1.36 ratio    PTT - ( 02 Sep 2023 10:10 )  PTT:41.6 sec  Urinalysis Basic - ( 03 Sep 2023 09:55 )    Color: x / Appearance: x / SG: x / pH: x  Gluc: 123 mg/dL / Ketone: x  / Bili: x / Urobili: x   Blood: x / Protein: x / Nitrite: x   Leuk Esterase: x / RBC: x / WBC x   Sq Epi: x / Non Sq Epi: x / Bacteria: x    LIVER FUNCTIONS - ( 03 Sep 2023 09:55 )  Alb: 2.0 g/dL / Pro: 4.7 g/dL / ALK PHOS: 105 U/L / ALT: 139 U/L / AST: 121 U/L / GGT: x           COVID-19 PCR: NotDetec (08-31-23 @ 11:40)    RECENT CULTURES:  09-01 @ 09:19 ET Tube ET Tube     Mixed gram negative rods including  Moderate Stenotrophomonas maltophilia    Numerous polymorphonuclear leukocytes per low power field  No Squamous epithelial cells per low power field  Numerous Gram Negative Rods per oil power field  Numerous Gram positive cocci in pairs per oil power field  Few Gram Positive Rods per oil power field    08-31 @ 13:20 .Blood Blood-Peripheral Blood Culture PCR    Growth in aerobic and anaerobic bottles: Streptococcus mitis/oralis group  Alpha hemolytic streptoccous (Not Streptococcus pneumoniae or  Enterococcus)  isolated from a single blood culture set may represent contamination.  Contact the MicrobiologyDepartment at [phone] if susceptibility  testing is clinically indicated.  Direct identification is available within approximately 3-5  hours either by Blood Panel Multiplexed PCR or Direct  MALDI-TOF. Details: [email or web address]    Growth in anaerobic bottle: Gram Positive Cocci in Pairs and Chains  Growth in aerobic bottle: Gram Positive Cocci in Pairs and Chains    08-31 @ 13:15 .Blood Blood-Peripheral     No growth at 48 Hours    08-27 @ 13:20 Clean Catch Clean Catch (Midstream)     <10,000 CFU/mL Normal Urogenital Micki    All imaging and other data have been reviewed.  < from: CT Chest w/ IV Cont (08.31.23 @ 15:10) >  IMPRESSION:  Small right pleural effusion and associated compressive atelectasis.  Small to moderate left-sided pleural effusion with extensive partial   atelectasis left lower lobe.  Patchy airspace consolidation left upper lobe with peribronchial   opacities right upper lobe, likely related to infection/pneumonia.  CT findings as discussed which are suggestive of hypoperfusion complex   (shock bowel).  No CT evidence for acute gastrointestinal bleeding.  Other findings as discussed above.    Assessment and Plan:   54yo woman with PMH of anorexia nervosa for more than 30yers, FTT with low BMP, hypothyroidism, osteoporosis, chronic hyponatremia was admitted on 8/27 after a fall, found to be pancytopenic.   She also had hypoglycemia. Patient's sister is in the bedside and reports that she has been getting worse for the last 6 months and has become increasingly more frail. Patient lives alone with aides.  On admission no fever, chills, chest pain, palpitations, SOB, cough, abdominal pain, nausea, vomiting, diarrhea, constipation, urinary frequency, urgency, or dysuria, headaches.  On 8/28 became hypoglycemic and unresponsive, so was intubated and was transferred to MICU. On 8/30 extubated but reintubated on 8/31 again.   Since then has worsening of pancytopenia and also, has developed pneumonia. ET tube culture showed Stenotrophomonas.     Even though her pancytopenia could be related to poor nutrition but since she used to go out for daily walkings as per sister, will rule out tick borne diseases, no rash or bug bites as far as sister knows.   She also had strep bacteremia that could be real infection, so will rule out endocarditis causing pancytopenia. No fever but she doesn;t have a normal immune system to have normal immune response.   ET culture with Stenotrophomonas could be colonization in tube or real, since she is very sick will start her on Levaquin. Bactrim is the drug of choice but will cause more bone marrow suppression.   Even though cQT is prolonged but since she is intubated on monitor, levaquin would be a better choice, for any reason unable to continue levaquin, can start Minocycline.     # Malnutrition   # Pancytopenia  # Respiratory failue  # Strep Bacteremia   # Stenotrophomonas pneumonia     - Repeat blood cultures x 2   - Will order tick PCR and serology  - Monitor CBC and ECG for cQT  - Start Levaquin 750mg daily   - TTE     Thank you for courtesy of this consult.     Will follow.  Discussed with the primary team.     Adelia Peralta MD  Division of Infectious Diseases   Please call ID service at [phone] with any question.    75 minutes spent on total encounter assessing patient, examination, chart review, counseling and coordinating care by the attending physician/nurse/care manager.

## 2023-09-03 NOTE — PROGRESS NOTE ADULT - ASSESSMENT
MYRNA  Hypernatremia s/p hyponatremia  Hypokalemia/hyperkalemia   Hypothyroidism  FTT  Anemia   Acute respiratory failure   Bradycardia  Hyperphosphatemia     -MYRNA likely from hypoglycemic event, improving  -Suspect low solute in take, tea and toast physiology  -Urine indices reviewed  -PRBCs per primary  -TSH elevated, check T4, could have also caused hyponatremia  -Deemed not to have capacity by psych; now intubated however  -Sodium improved but now elevated; trend improving again  -FW with tube feeds; consider increasing FW to 150cc q6hrs  -Monitor for refeeding when using GI tract; daily phos level  -Hyperchloremic acidosis, mild - monitor for now  -Mir erinn 8; s/p replacement per ICU  -Repeat labs pending, will follow up; electrolytes replacements per ICU  -Vent management per ICU  -Will check Fek if the potassium is recurrently low

## 2023-09-03 NOTE — PROGRESS NOTE ADULT - SUBJECTIVE AND OBJECTIVE BOX
Failure to thrive in adult    HPI:  54yo F with PMH anemia, hypothyroidism, osteoporosis, chronic hyponatremia, depression, anxiety presents to ED on 8/27/23 due to weakness and hypoglycemia.  Patient was seen at this emergency room 8/24 ago for a fall at physical therapy 8/23.  Was diagnosed with left wrist fracture at that time.  Splint was applied and seen by orthopedics.  Recommended admission due to fall risk and unsafe discharge.  Patient refused admission and left AMA.  Patient was fully aware of  fall risk and all lab abnormalities.  States she wanted to follow-up outpatient.  Today patient became weak and aide assisted her down to the floor.  Denies hitting head or any injuries from the fall because aide assisted her.  EMS was called.  Sugar on arrival per EMS was 48 and given D10.  Sugar on arrival to emergency room was 171.  Patient denies any pain or complaints.  Denies any injuries from the fall.  At this time patient is refusing any treatment or test.  States she just wants to go home and eat.  States her sugar was low because she did not eat today but she says she will eat when she gets home. Patient removed splint that was placed 2 days ago and was not wearing splint today per EMS. Patient's sister reports that she has been getting worse for the last 6 months and has become increasingly more frail. Patient lives alone with aides as per sister. Patient has ritualistic eating habits and a diet which includes eggs, fish, and vegetables. She currently takes no medications.     Denies fever, chills, chest pain, palpitations, SOB, cough, abdominal pain, nausea, vomiting, diarrhea, constipation, urinary frequency, urgency, or dysuria, headaches, changes in vision, dizziness, numbness, tingling.  Denies recent travel, recent antibiotic use, or sick contacts. (27 Aug 2023 17:14)    Interval History:  Patient was seen and examined at bedside around 9:30 am.  On CPAP trial.   Off sedation and pressors.      ROS:  Unable to obtain due to her underlying condition.     PHYSICAL EXAM:  Vital Signs  T(C): 35.5 (03 Sep 2023 12:00), Max: 36.3 (03 Sep 2023 04:00)  T(F): 95.9 (03 Sep 2023 12:00), Max: 97.3 (03 Sep 2023 04:00)  HR: 75 (03 Sep 2023 13:00) (66 - 97)  BP: 119/81 (03 Sep 2023 09:00) (90/65 - 126/83)  BP(mean): 96 (03 Sep 2023 09:00) (73 - 99)  RR: 25 (03 Sep 2023 13:00) (9 - 25)  SpO2: 96% (03 Sep 2023 13:00) (93% - 100%)  Parameters below as of 03 Sep 2023 13:00  Patient On (Oxygen Delivery Method): ventilator  O2 Concentration (%): 30  General: Middle aged female lying in bed comfortably. No acute distress. Cachectic.   HEENT: Clear conjunctivae. Dry mucus membrane. Intubated.   Chest: Good air entry. No wheezing, rales or rhonchi.   Heart: Normal S1 & S2. RRR.   Abdomen: Non distended. Soft. Non-tender. + BS  : Levy's catheter in place.   Ext: RLE: No pedal edema. LLE: 1+ pedal edema.   Neuro: Sleeping   Skin: Warm and Dry  Psychiatry: Unable to assess     I&O's Summary    02 Sep 2023 07:01  -  03 Sep 2023 07:00  --------------------------------------------------------  IN: 2635.8 mL / OUT: 1450 mL / NET: 1185.8 mL    03 Sep 2023 07:01  -  03 Sep 2023 13:19  --------------------------------------------------------  IN: 242.5 mL / OUT: 215 mL / NET: 27.5 mL    LABS:  CAPILLARY BLOOD GLUCOSE  POCT Blood Glucose.: 212 mg/dL (03 Sep 2023 11:59)  POCT Blood Glucose.: 133 mg/dL (03 Sep 2023 08:01)  POCT Blood Glucose.: 136 mg/dL (03 Sep 2023 04:46)  POCT Blood Glucose.: 143 mg/dL (03 Sep 2023 00:45)  POCT Blood Glucose.: 181 mg/dL (02 Sep 2023 21:06)  POCT Blood Glucose.: 227 mg/dL (02 Sep 2023 17:10)  POCT Blood Glucose.: 219 mg/dL (02 Sep 2023 15:53)                      10.6   1.16  )-----------( 68       ( 03 Sep 2023 09:55 )             31.6     09-03    149<H>  |  123<H>  |  50<H>  ----------------------------<  123<H>  3.6   |  20<L>  |  0.72    Ca    7.0<L>      03 Sep 2023 09:55  Phos  2.4     09-03  Mg     2.1     09-03    TPro  4.7<L>  /  Alb  2.0<L>  /  TBili  0.9  /  DBili  x   /  AST  121<H>  /  ALT  139<H>  /  AlkPhos  105  09-03    PT/INR - ( 02 Sep 2023 10:10 )   PT: 15.8 sec;   INR: 1.36 ratio         PTT - ( 02 Sep 2023 10:10 )  PTT:41.6 sec    Blood Culture: 09-01 @ 09:19  Organism --  Gram Stain Blood -- Gram Stain   Numerous polymorphonuclear leukocytes per low power field  No Squamous epithelial cells per low power field  Numerous Gram Negative Rods per oil power field  Numerous Gram positive cocci in pairs per oil power field  Few Gram Positive Rods per oil power field  Specimen Source ET Tube ET Tube  Culture-Blood --    08-31 @ 13:20  Organism Blood Culture PCR  Gram Stain Blood -- Gram Stain   Growth in anaerobic bottle: Gram Positive Cocci in Pairs and Chains  Growth in aerobic bottle: Gram Positive Cocci in Pairs and Chains  Specimen Source .Blood Blood-Peripheral  Culture-Blood --    08-31 @ 13:15  Organism --  Gram Stain Blood -- Gram Stain --  Specimen Source .Blood Blood-Peripheral  Culture-Blood --    RADIOLOGY & ADDITIONAL STUDIES:  Reviewed     MEDICATIONS  (STANDING):  chlorhexidine 0.12% Liquid 15 milliLiter(s) Oral Mucosa every 12 hours  chlorhexidine 2% Cloths 1 Application(s) Topical <User Schedule>  dextrose 50% Injectable 25 Gram(s) IV Push once  dextrose 50% Injectable 25 Gram(s) IV Push once  dextrose 50% Injectable 12.5 Gram(s) IV Push once  dextrose Oral Gel 15 Gram(s) Oral once  glucagon  Injectable 1 milliGRAM(s) IntraMuscular once  levoFLOXacin IVPB 750 milliGRAM(s) IV Intermittent once  levoFLOXacin IVPB      multivitamin/minerals/iron Oral Solution (CENTRUM) 15 milliLiter(s) Oral daily  thiamine IVPB 200 milliGRAM(s) IV Intermittent daily

## 2023-09-03 NOTE — PROCEDURE NOTE - NSPROCDETAILS_GEN_ALL_CORE
location identified, draped/prepped, sterile technique used, needle inserted/introduced/positive blood return obtained via catheter/connected to a pressurized flush line/sutured in place/hemostasis with direct pressure, dressing applied/Seldinger technique/all materials/supplies accounted for at end of procedure
location identified, draped/prepped, sterile technique used/blood seen on insertion/dressing applied/flushes easily/secured in place/sterile technique, catheter placed
location identified, draped/prepped, sterile technique used, needle inserted/introduced/positive blood return obtained via catheter/connected to a pressurized flush line/sutured in place/hemostasis with direct pressure, dressing applied/Seldinger technique/all materials/supplies accounted for at end of procedure
Pt sedated with versed 1mg IV x1. Posterior inferor oropharynx visualized directly with video laryngoscopy with 10Fr nasogastric tube advanced with forceps through superior esophagus/nasogastric
guidewire recovered/lumen(s) aspirated and flushed/sterile dressing applied/sterile technique, catheter placed/ultrasound guidance with use of sterile gel and probe cove
patient pre-oxygenated, tube inserted, placement confirmed

## 2023-09-03 NOTE — PHARMACOTHERAPY INTERVENTION NOTE - COMMENTS
52 yo female being treated for Stenotrophomonas maltophilia in ET tube cx. Pt also with strep bacteremia. Patient is pancytopenic so ID prefers Levaquin over Bactrim (drug of choice) given adverse effect of bone marrow suppression. Patient is 26.3 kg, CrCl of ~37. Recommended Levaquin 750 mg q48h. Patient's ECG from 8/29 reflects prolonged . ICU team aware, patient on monitor with follow up QTC's within normal range. Will continue to monitor. Order entered per discussion with Dr. Jacob.  
52 yo female mechanically vented receiving trickle feeds ordered for famotidine 20 mg IV push BID. Clarified need for stress ulcer prophylaxis since patient is tolerating enteral feeds. Studies have shown no additional benefit of pharmacologic SUP when early EN is initiated in critically ill, mechanically ventilated patients in the medical ICU. Order discontinued per discussion.  
Patient currently ordered for levothyroxine 25 mcg daily - discussed with team since patient is NPO recommended switching to IV levothyroxine 18.5 mcg (~75% of PO dose). MD agreed and order entered.    Team also would like patient to be on DVT prophylaxis - discussed lovenox vs. heparin and opted to go with heparin 5000 units subq q12h given patient's low body weight (~27 kg). MD agreed and order entered

## 2023-09-03 NOTE — PROGRESS NOTE ADULT - SUBJECTIVE AND OBJECTIVE BOX
Patient is a 53y old  Female who presents with a chief complaint of fall, pancytopenia (27 Aug 2023 17:14)       HPI:  52yo F with PMH anemia, hypothyroidism, osteoporosis, chronic hyponatremia, depression, anxiety presents to ED on 8/27/23 due to weakness and hypoglycemia.  Patient was seen at this emergency room 8/24 ago for a fall at physical therapy 8/23.  Was diagnosed with left wrist fracture at that time.  Splint was applied and seen by orthopedics.  Recommended admission due to fall risk and unsafe discharge.  Patient refused admission and left AMA.  Patient was fully aware of  fall risk and all lab abnormalities.  States she wanted to follow-up outpatient.  Today patient became weak and aide assisted her down to the floor.  Denies hitting head or any injuries from the fall because aide assisted her.  EMS was called.  Sugar on arrival per EMS was 48 and given D10.  Sugar on arrival to emergency room was 171.  Patient denies any pain or complaints.  Denies any injuries from the fall.  At this time patient is refusing any treatment or test.  States she just wants to go home and eat.  States her sugar was low because she did not eat today but she says she will eat when she gets home. Patient removed splint that was placed 2 days ago and was not wearing splint today per EMS. Patient's sister reports that she has been getting worse for the last 6 months and has become increasingly more frail. Patient lives alone with aides as per sister. Patient has ritualistic eating habits and a diet which includes eggs, fish, and vegetables. She currently takes no medications.   Renal consulted for multiple electrolyte abnormalities.  She states she wants to urinate.  Denies any N/V/SOB.  Eating. States she had edema.     No acute events noted  Intubated       PAST MEDICAL & SURGICAL HISTORY:  Anemia      Depression      Chronic musculoskeletal pain      Anorexia  at age 15 yrs      No significant past surgical history           FAMILY HISTORY:  No pertinent family history in first degree relatives    [place]    Social History:Non smoker    MEDICATIONS  (STANDING):  cefepime   IVPB 2000 milliGRAM(s) IV Intermittent every 12 hours  chlorhexidine 0.12% Liquid 15 milliLiter(s) Oral Mucosa every 12 hours  chlorhexidine 2% Cloths 1 Application(s) Topical <User Schedule>  dextrose 50% Injectable 25 Gram(s) IV Push once  dextrose 50% Injectable 25 Gram(s) IV Push once  dextrose 50% Injectable 12.5 Gram(s) IV Push once  dextrose Oral Gel 15 Gram(s) Oral once  glucagon  Injectable 1 milliGRAM(s) IntraMuscular once  multivitamin/minerals/iron Oral Solution (CENTRUM) 15 milliLiter(s) Oral daily  norepinephrine Infusion 0.05 MICROgram(s)/kG/Min (2.5 mL/Hr) IV Continuous <Continuous>  propofol Infusion 20 MICROgram(s)/kG/Min (3.2 mL/Hr) IV Continuous <Continuous>  thiamine IVPB 200 milliGRAM(s) IV Intermittent daily    MEDICATIONS  (PRN):  fentaNYL    Injectable 25 MICROGram(s) IV Push every 4 hours PRN vent synchrony        Allergies    No Known Allergies    Intolerances         REVIEW OF SYSTEMS:  Intubated    ICU Vital Signs Last 24 Hrs  T(C): 36.3 (03 Sep 2023 04:00), Max: 36.3 (03 Sep 2023 04:00)  T(F): 97.3 (03 Sep 2023 04:00), Max: 97.3 (03 Sep 2023 04:00)  HR: 91 (03 Sep 2023 06:00) (66 - 97)  BP: 124/76 (03 Sep 2023 06:00) (90/65 - 124/83)  BP(mean): 95 (03 Sep 2023 06:00) (73 - 98)  ABP: 134/84 (02 Sep 2023 18:00) (86/58 - 134/84)  ABP(mean): 105 (02 Sep 2023 18:00) (68 - 105)  RR: 15 (03 Sep 2023 06:00) (8 - 25)  SpO2: 99% (03 Sep 2023 06:00) (78% - 100%)    O2 Parameters below as of 02 Sep 2023 20:30  Patient On (Oxygen Delivery Method): ventilator, AC settings        PHYSICAL EXAM:    GENERAL: cachectic, frail appearing; intubated  HEAD:  Atraumatic, Normocephalic  NERVOUS SYSTEM:  sedated  CHEST/LUNG: Clear to auscultation bilaterally  HEART: Regular rate and rhythm  EXTREMITIES:  No Edema  SKIN: + Ecchymosis LE      LABS:                                     10.2   2.45  )-----------( 89       ( 02 Sep 2023 18:25 )             30.3     09-02    147<H>  |  120<H>  |  48<H>  ----------------------------<  193<H>  2.9<LL>   |  22  |  0.70    Ca    6.4<LL>      02 Sep 2023 18:25  Phos  3.3     09-02  Mg     2.2     09-02    TPro  3.9<L>  /  Alb  2.0<L>  /  TBili  1.7<H>  /  DBili  x   /  AST  182<H>  /  ALT  173<H>  /  AlkPhos  93  09-02    PT/INR - ( 02 Sep 2023 10:10 )   PT: 15.8 sec;   INR: 1.36 ratio         PTT - ( 02 Sep 2023 10:10 )  PTT:41.6 sec  Urinalysis Basic - ( 02 Sep 2023 18:25 )    Color: x / Appearance: x / SG: x / pH: x  Gluc: 193 mg/dL / Ketone: x  / Bili: x / Urobili: x   Blood: x / Protein: x / Nitrite: x   Leuk Esterase: x / RBC: x / WBC x   Sq Epi: x / Non Sq Epi: x / Bacteria: x        ABG - ( 01 Sep 2023 10:31 )  pH, Arterial: 7.28  pH, Blood: x     /  pCO2: 42    /  pO2: 62    / HCO3: 20    / Base Excess: -7.0  /  SaO2: 90.1

## 2023-09-03 NOTE — PROCEDURE NOTE - NSINFORMCONSENT_GEN_A_CORE
Benefits, risks, and possible complications of procedure explained to patient/caregiver who verbalized understanding and gave verbal consent.
brittni marte/Benefits, risks, and possible complications of procedure explained to patient/caregiver who verbalized understanding and gave written consent.
This was an emergent procedure.
Benefits, risks, and possible complications of procedure explained to patient/caregiver who verbalized understanding and gave verbal consent.
This was an emergent procedure.

## 2023-09-03 NOTE — PROCEDURE NOTE - NSPROCNAME_GEN_A_CORE
Tracheal Intubation
Arterial Puncture/Cannulation
Central Line Insertion
Peripheral Line Insertion
Arterial Puncture/Cannulation
Gastric Intubation/Gastric Lavage

## 2023-09-03 NOTE — PROGRESS NOTE ADULT - SUBJECTIVE AND OBJECTIVE BOX
24 hour events:     Review of Systems:  Constitutional: No fever, chills, fatigue  Neuro: No headache, numbness, weakness  Resp: No cough, wheezing, shortness of breath  CVS: No chest pain, palpitations, leg swelling  GI: No abdominal pain, nausea, vomiting, diarrhea   : No dysuria, frequency, incontinence  Skin: No itching, burning, rashes, or lesions   Msk: No joint pain or swelling  Psych: No depression, anxiety, mood swings    T(F): 96.3 (09-03-23 @ 16:55), Max: 97.3 (09-03-23 @ 04:00)  HR: 81 (09-03-23 @ 19:00) (66 - 105)  BP: 119/81 (09-03-23 @ 09:00) (90/65 - 126/83)  RR: 24 (09-03-23 @ 19:00) (11 - 31)  SpO2: 96% (09-03-23 @ 19:00) (93% - 100%)  Wt(kg): --    Mode: CPAP with PS, FiO2: 30, PEEP: 5, PS: 5  09-02-23 @ 07:01  -  09-03-23 @ 07:00  --------------------------------------------------------  IN: 2635.8 mL / OUT: 1450 mL / NET: 1185.8 mL    09-03-23 @ 07:01  -  09-03-23 @ 19:51  --------------------------------------------------------  IN: 850 mL / OUT: 460 mL / NET: 390 mL        CAPILLARY BLOOD GLUCOSE      POCT Blood Glucose.: 212 mg/dL (03 Sep 2023 16:51)      I&O's Summary    02 Sep 2023 07:01  -  03 Sep 2023 07:00  --------------------------------------------------------  IN: 2635.8 mL / OUT: 1450 mL / NET: 1185.8 mL    03 Sep 2023 07:01  -  03 Sep 2023 19:51  --------------------------------------------------------  IN: 850 mL / OUT: 460 mL / NET: 390 mL        Physical Exam:   Gen:  Neuro:  HEENT:  Resp:  CVS:  Abd:  Ext:  Skin:    Meds:  levoFLOXacin IVPB       dextrose 50% Injectable IV Push  dextrose 50% Injectable IV Push  dextrose 50% Injectable IV Push  dextrose Oral Gel Oral  glucagon  Injectable IntraMuscular                multivitamin/minerals/iron Oral Solution (CENTRUM) Oral  thiamine IVPB IV Intermittent      chlorhexidine 0.12% Liquid Oral Mucosa  chlorhexidine 2% Cloths Topical                              10.6   1.16  )-----------( 68       ( 03 Sep 2023 09:55 )             31.6       09-03    148<H>  |  123<H>  |  49<H>  ----------------------------<  179<H>  4.3   |  21<L>  |  0.69    Ca    6.4<LL>      03 Sep 2023 19:00  Phos  3.9     09-03  Mg     1.9     09-03    TPro  4.7<L>  /  Alb  2.0<L>  /  TBili  0.9  /  DBili  x   /  AST  121<H>  /  ALT  139<H>  /  AlkPhos  105  09-03          PT/INR - ( 02 Sep 2023 10:10 )   PT: 15.8 sec;   INR: 1.36 ratio         PTT - ( 02 Sep 2023 10:10 )  PTT:41.6 sec  Urinalysis Basic - ( 03 Sep 2023 19:00 )    Color: x / Appearance: x / SG: x / pH: x  Gluc: 179 mg/dL / Ketone: x  / Bili: x / Urobili: x   Blood: x / Protein: x / Nitrite: x   Leuk Esterase: x / RBC: x / WBC x   Sq Epi: x / Non Sq Epi: x / Bacteria: x      ET Tube ET Tube   Mixed gram negative rods including  Moderate Stenotrophomonas maltophilia   Numerous polymorphonuclear leukocytes per low power field  No Squamous epithelial cells per low power field  Numerous Gram Negative Rods per oil power field  Numerous Gram positive cocci in pairs per oil power field  Few Gram Positive Rods per oil power field 09-01 @ 09:19  .Blood Blood-Peripheral   Growth in aerobic and anaerobic bottles: Streptococcus mitis/oralis group  Alpha hemolytic streptoccous (Not Streptococcus pneumoniae or  Enterococcus)  isolated from a single blood culture set may represent contamination.  Contact the MicrobiologyDepartment at [phone] if susceptibility  testing is clinically indicated.  Direct identification is available within approximately 3-5  hours either by Blood Panel Multiplexed PCR or Direct  MALDI-TOF. Details: [email or web address]   Growth in anaerobic bottle: Gram Positive Cocci in Pairs and Chains  Growth in aerobic bottle: Gram Positive Cocci in Pairs and Chains 08-31 @ 13:20  .Blood Blood-Peripheral   No growth at 72 Hours -- 08-31 @ 13:15              Radiology: ***  Bedside ultrasound: ***    CENTRAL LINE: N/Y          DATE INSERTED:              REMOVE: Y/N  CHATTERJEE: N/Y                       DATE INSERTED:              REMOVE: Y/N  A-LINE: N/Y                       DATE INSERTED:              REMOVE: Y/N    GLOBAL ISSUE/BEST PRACTICE:  Analgesia:  Sedation:  CAM-ICU:   HOB elevation: yes  Stress ulcer prophylaxis:  VTE prophylaxis:  Glycemic control:  Nutrition:    CODE STATUS: ***    CRITICAL CARE TIME SPENT:  (Assessing presenting problems of acute illness, which pose high probability of life threatening deterioration or end organ damage/dysfunction, as well as medical decision making including initiating plan of care, reviewing data, reviewing radiologic exams, discussing with multidisciplinary team,  discussing goals of care with patient/family, and writing this note.  Non-inclusive of procedures performed)     HPI: 54 y/o F w/ pmh of anorexia, severe malnutrition, hypothyroid, OP, hx of multiple fx, chronic hypoNA, FTT, presented to admitted to [facility] for weakness and hypoglycemia, pt w/  RRT on the floor after pt was found hypoglycemic w/ an FS of 16 and thelma-arrest. Pt was extubated on and reintubated on 08/31 for resp failure. Now remains in the MICU for metabolic encephalopathy, acute hypoxic resp failure, anemia, thrombocytopenia, hypoK/hypoCa.    24 hour events: tonight repeat labs reviewed, Ca level still low, A-line placed earlier today    Review of Systems: pt Intubated     T(F): 96.3 (09-03-23 @ 16:55), Max: 97.3 (09-03-23 @ 04:00)  HR: 81 (09-03-23 @ 19:00) (66 - 105)  BP: 119/81 (09-03-23 @ 09:00) (90/65 - 126/83)  RR: 24 (09-03-23 @ 19:00) (11 - 31)  SpO2: 96% (09-03-23 @ 19:00) (93% - 100%)  Wt(kg): --    Mode: CPAP with PS, FiO2: 30, PEEP: 5, PS: 5  09-02-23 @ 07:01  -  09-03-23 @ 07:00  --------------------------------------------------------  IN: 2635.8 mL / OUT: 1450 mL / NET: 1185.8 mL    09-03-23 @ 07:01  -  09-03-23 @ 19:51  --------------------------------------------------------  IN: 850 mL / OUT: 460 mL / NET: 390 mL        CAPILLARY BLOOD GLUCOSE      POCT Blood Glucose.: 212 mg/dL (03 Sep 2023 16:51)      I&O's Summary    02 Sep 2023 07:01  -  03 Sep 2023 07:00  --------------------------------------------------------  IN: 2635.8 mL / OUT: 1450 mL / NET: 1185.8 mL    03 Sep 2023 07:01  -  03 Sep 2023 19:51  --------------------------------------------------------  IN: 850 mL / OUT: 460 mL / NET: 390 mL        Physical Exam:   Gen: chronically ill appearing and cachexia  Neuro: intubated  HEENT: NC/AT  Resp: good air entry b/l  CVS: +RRR  Abd: Abd soft, ND  Ext: no edema   Skin: warm/dry    Meds:  levoFLOXacin IVPB       dextrose 50% Injectable IV Push  dextrose 50% Injectable IV Push  dextrose 50% Injectable IV Push  dextrose Oral Gel Oral  glucagon  Injectable IntraMuscular                multivitamin/minerals/iron Oral Solution (CENTRUM) Oral  thiamine IVPB IV Intermittent      chlorhexidine 0.12% Liquid Oral Mucosa  chlorhexidine 2% Cloths Topical                              10.6   1.16  )-----------( 68       ( 03 Sep 2023 09:55 )             31.6       09-03    148<H>  |  123<H>  |  49<H>  ----------------------------<  179<H>  4.3   |  21<L>  |  0.69    Ca    6.4<LL>      03 Sep 2023 19:00  Phos  3.9     09-03  Mg     1.9     09-03    TPro  4.7<L>  /  Alb  2.0<L>  /  TBili  0.9  /  DBili  x   /  AST  121<H>  /  ALT  139<H>  /  AlkPhos  105  09-03          PT/INR - ( 02 Sep 2023 10:10 )   PT: 15.8 sec;   INR: 1.36 ratio         PTT - ( 02 Sep 2023 10:10 )  PTT:41.6 sec  Urinalysis Basic - ( 03 Sep 2023 19:00 )    Color: x / Appearance: x / SG: x / pH: x  Gluc: 179 mg/dL / Ketone: x  / Bili: x / Urobili: x   Blood: x / Protein: x / Nitrite: x   Leuk Esterase: x / RBC: x / WBC x   Sq Epi: x / Non Sq Epi: x / Bacteria: x      ET Tube ET Tube   Mixed gram negative rods including  Moderate Stenotrophomonas maltophilia   Numerous polymorphonuclear leukocytes per low power field  No Squamous epithelial cells per low power field  Numerous Gram Negative Rods per oil power field  Numerous Gram positive cocci in pairs per oil power field  Few Gram Positive Rods per oil power field 09-01 @ 09:19  .Blood Blood-Peripheral   Growth in aerobic and anaerobic bottles: Streptococcus mitis/oralis group  Alpha hemolytic streptoccous (Not Streptococcus pneumoniae or  Enterococcus)  isolated from a single blood culture set may represent contamination.  Contact the MicrobiologyDepartment at [phone] if susceptibility  testing is clinically indicated.  Direct identification is available within approximately 3-5  hours either by Blood Panel Multiplexed PCR or Direct  MALDI-TOF. Details: [email or web address]   Growth in anaerobic bottle: Gram Positive Cocci in Pairs and Chains  Growth in aerobic bottle: Gram Positive Cocci in Pairs and Chains 08-31 @ 13:20  .Blood Blood-Peripheral   No growth at 72 Hours -- 08-31 @ 13:15              Radiology:     < from: Xray Chest 1 View- PORTABLE-Urgent (Xray Chest 1 View- PORTABLE-Urgent .) (09.01.23 @ 14:22) >    ACC: [id number] EXAM:  XR CHEST PORTABLE URGENT 1V   ORDERED BY: JEANNIE ORTEGA     PROCEDURE DATE:  09/01/2023          INTERPRETATION:  AP erect chest on September 1, 2023 at 2:12 PM. Patient   is    Heart size normal.    NG tube is similar positionto August 1    There is a persistent small left base effusion.    There is a right curve thoracolumbar junction old right rib fracture.    Chest is similar.    IMPRESSION: Stable findings as above.    --- End of Report ---        BERENICE TREVINO MD; Attending Radiologist  This document has been electronically signed. Sep  1 2023  3:31PM    < end of copied text >      CODE STATUS: FULL CODE    CRITICAL CARE TIME SPENT: 38 mins  (Assessing presenting problems of acute illness, which pose high probability of life threatening deterioration or end organ damage/dysfunction, as well as medical decision making including initiating plan of care, reviewing data, reviewing radiologic exams, discussing with multidisciplinary team,  discussing goals of care with patient/family, and writing this note.  Non-inclusive of procedures performed)

## 2023-09-03 NOTE — PROCEDURE NOTE - ADDITIONAL PROCEDURE DETAILS
Patient with poor venous access, difficult to obtain blood samples. +BCx yesterday prompting line holiday. Arterial line place for accurate BP monitoring and frequent blood draws given poor venous access.

## 2023-09-03 NOTE — PROGRESS NOTE ADULT - ASSESSMENT
54 y/o F w/ pmh of anorexia, severe malnutrition, hypothyroid, OP, hx of multiple fx, chronic hypoNA, FTT, presented to admitted to [facility] for weakness and hypoglycemia, pt w/  RRT on the floor after pt was found hypoglycemic w/ an FS of 16 and thelma-arrest. Pt was extubated on and reintubated on 08/31 for resp failure. Now remains in the MICU for metabolic encephalopathy, acute hypoxic resp failure, anemia, thrombocytopenia, hypoK/hypoCa.    -Neuro: Intubated and now off sedation still w/ poor MS not a candidate for extubation  -Cardiac: Septic shock now improved and off pressors, maintain MAP>65  -Resp: Acute Hypoxic resp failure cont lung protective ventilation, tolerated PS trail but unable to extubated 2/2 to poor MS, will placed pt back on full support overnight, will titrate vent setting as tolerated to maintain o2 >90%  -GI: cont TF as ordered/tolerated  -Renal: Renal function stable cont to monitor along w/ lytes, HyperNA on free water, hypocalcemia noted cont to trend for now  -Endo: Hypoglycemia/hyperglycemia cont to monitor FS closely  -ID: Pna on IVAB  -Heme: DVT ppx w/ SCD  -Dispo: Pt remains in the MICU, currently full code, GOC ongoing, dispo pending ICU course

## 2023-09-03 NOTE — CHART NOTE - NSCHARTNOTEFT_GEN_A_CORE
Assessment/Follow up: Pt remains intubated. Now off propofol. Off levophed. TF changed to Nepro per MD order 9/1, now advanced to current rate 20cc/hr x 20hrs (providing 708kcal, 32 gm protein). Tolerated well past 24hrs. Hypernatremia ongoing however slightly improved. On free water flush 100cc q 6hrs. Per Renal, recommend increase to 150cc q 6hrs. +rectal tube -small amount liquid stool 9/3 per RN. Continues to have electrolyte imbalances. (9/3)Low K 2.9, Ca 6.4. s/p Calcium gluconate, KCl 9/2. Recommend additional supplementation per MD order. Current phos 3.3, Mg 2.2 (9/3)- within desirable limits s/p mg sulfate 9/2. Continues on thiamine supplementation and MVI with minerals daily. Recommend changing TF formula to Vital 1.5 with improved MYRNA (K now low, phos wdl) to better meet estimated nutrient needs. Recommend Vital 1.5 @ 20cc/hr & increase by 10cc q 24hrs until goal rate 40cc/hr x20hrs. Continue to monitor electrolytes closely with replacement prn.     Factors impacting intake: [ ] none [ ] nausea  [ ] vomiting [ ] diarrhea [ ] constipation  [ ]chewing problems [ ] swallowing issues  [x ] other: intubated, NPO with OGT feedings, anorexia    Diet Presciption: Diet, NPO with Tube Feed:   Tube Feeding Modality: Orogastric  Nepro with Carb Steady  Total Volume for 24 Hours (mL): 400  Continuous  Starting Tube Feed Rate {mL per Hour}: 20  Increase Tube Feed Rate by (mL): 0     Every 24 hours  Until Goal Tube Feed Rate (mL per Hour): 20  Tube Feed Duration (in Hours): 20  Tube Feed Start Time: 10:00  Tube Feed Stop Time: 06:00  Free Water Flush (09-02-23 @ 09:31)    Current Weight: Weight (kg): 26.7 (08-28 @ 07:30); 57.9lbs(9/1), chester feet/ankles 1+edema, right wrist/hand 2+edema      Pertinent Medications: MEDICATIONS  (STANDING):  chlorhexidine 0.12% Liquid 15 milliLiter(s) Oral Mucosa every 12 hours  chlorhexidine 2% Cloths 1 Application(s) Topical <User Schedule>  dextrose 50% Injectable 25 Gram(s) IV Push once  dextrose 50% Injectable 12.5 Gram(s) IV Push once  dextrose 50% Injectable 25 Gram(s) IV Push once  dextrose Oral Gel 15 Gram(s) Oral once  glucagon  Injectable 1 milliGRAM(s) IntraMuscular once  multivitamin/minerals/iron Oral Solution (CENTRUM) 15 milliLiter(s) Oral daily  thiamine IVPB 200 milliGRAM(s) IV Intermittent daily  trimethoprim / sulfamethoxazole IVPB 105 milliGRAM(s) IV Intermittent every 8 hours    MEDICATIONS  (PRN):    Pertinent Labs: 09-02 Na147 mmol/L<H> Glu 193 mg/dL<H> K+ 2.9 mmol/L<LL> Cr  0.70 mg/dL BUN 48 mg/dL<H> 09-02 Phos 3.3 mg/dL 09-02 Alb 2.0 g/dL<L> 09-02 Chol --    LDL --    HDL --    Trig 21 mg/dL     CAPILLARY BLOOD GLUCOSE      POCT Blood Glucose.: 133 mg/dL (03 Sep 2023 08:01)  POCT Blood Glucose.: 136 mg/dL (03 Sep 2023 04:46)  POCT Blood Glucose.: 143 mg/dL (03 Sep 2023 00:45)  POCT Blood Glucose.: 181 mg/dL (02 Sep 2023 21:06)  POCT Blood Glucose.: 227 mg/dL (02 Sep 2023 17:10)  POCT Blood Glucose.: 219 mg/dL (02 Sep 2023 15:53)  POCT Blood Glucose.: 196 mg/dL (02 Sep 2023 12:38)    Skin: midline thoracic spine II, right hip I, sacrum I. Richard 8.     Estimated Needs:   [x ] no change since previous assessment: Based off IBW 100lbs. 1132-1359kcal (25-30kcal/kg IBW), 45-54gm protein(1-1.2gm/kg IBW)  [ ] recalculated:     Previous Nutrition Diagnosis:   [ ] Inadequate Energy Intake [ ]Inadequate Oral Intake [ ] Excessive Energy Intake   [ ] Underweight [ ] Increased Nutrient Needs [ ] Overweight/Obesity   [ ] Altered GI Function [ ] Unintended Weight Loss [ ] Food & Nutrition Related Knowledge Deficit [x ] Malnutrition     Nutrition Diagnosis is [x ] ongoing  [ ] resolved [ ] not applicable     New Nutrition Diagnosis: [x ] not applicable       Interventions:   Recommend  [ ] Change Diet To:  [ ] Nutrition Supplement  [x ] Nutrition Support: Recommend changing TF formula to Vital 1.5 @20cc/hr & increase by 10cc q 24hrs until goal rate 40cc/hr x 20hrs (1200kcal, 54gm protein).  [x ] Other: Electrolyte replacement per MD order. Free water flush 150cc q 6hrs.     Monitoring and Evaluation:   [ ] PO intake [ x ] Tolerance to diet prescription [ x ] weights [ x ] labs[ x ] follow up per protocol  [ ] other: Assessment/Follow up: Pt remains intubated. Now off propofol. Off levophed. TF changed to Nepro per MD order 9/1, now advanced to current rate 20cc/hr x 20hrs (providing 708kcal, 32 gm protein). Tolerated well past 24hrs. Hypernatremia ongoing however slightly improved. On free water flush 100cc q 6hrs. Per Renal, recommend increase to 150cc q 6hrs. +rectal tube -small amount liquid stool 9/3 per RN. Continues to have electrolyte imbalances. (9/3)Low K 2.9, Ca 6.4. s/p Calcium gluconate, KCl 9/2. Recommend additional supplementation per MD order. Current phos 3.3, Mg 2.2 (9/3)- within desirable limits s/p mg sulfate 9/2. Continues on thiamine supplementation and MVI with minerals daily. Recommend changing TF formula to Vital 1.5 with improved MYRNA (K now low, phos wdl) to better meet estimated nutrient needs. Recommend Vital 1.5 @ 20cc/hr & increase by 10cc q 24hrs until goal rate 40cc/hr x20hrs. Slow TF advancement; close electrolyte monitoring with replacement prn.     Factors impacting intake: [ ] none [ ] nausea  [ ] vomiting [ ] diarrhea [ ] constipation  [ ]chewing problems [ ] swallowing issues  [x ] other: intubated, NPO with OGT feedings, anorexia    Diet Presciption: Diet, NPO with Tube Feed:   Tube Feeding Modality: Orogastric  Nepro with Carb Steady  Total Volume for 24 Hours (mL): 400  Continuous  Starting Tube Feed Rate {mL per Hour}: 20  Increase Tube Feed Rate by (mL): 0     Every 24 hours  Until Goal Tube Feed Rate (mL per Hour): 20  Tube Feed Duration (in Hours): 20  Tube Feed Start Time: 10:00  Tube Feed Stop Time: 06:00  Free Water Flush (09-02-23 @ 09:31)    Current Weight: Weight (kg): 26.7 (08-28 @ 07:30); 57.9lbs(9/1), chester feet/ankles 1+edema, right wrist/hand 2+edema      Pertinent Medications: MEDICATIONS  (STANDING):  chlorhexidine 0.12% Liquid 15 milliLiter(s) Oral Mucosa every 12 hours  chlorhexidine 2% Cloths 1 Application(s) Topical <User Schedule>  dextrose 50% Injectable 25 Gram(s) IV Push once  dextrose 50% Injectable 12.5 Gram(s) IV Push once  dextrose 50% Injectable 25 Gram(s) IV Push once  dextrose Oral Gel 15 Gram(s) Oral once  glucagon  Injectable 1 milliGRAM(s) IntraMuscular once  multivitamin/minerals/iron Oral Solution (CENTRUM) 15 milliLiter(s) Oral daily  thiamine IVPB 200 milliGRAM(s) IV Intermittent daily  trimethoprim / sulfamethoxazole IVPB 105 milliGRAM(s) IV Intermittent every 8 hours    MEDICATIONS  (PRN):    Pertinent Labs: 09-02 Na147 mmol/L<H> Glu 193 mg/dL<H> K+ 2.9 mmol/L<LL> Cr  0.70 mg/dL BUN 48 mg/dL<H> 09-02 Phos 3.3 mg/dL 09-02 Alb 2.0 g/dL<L> 09-02 Chol --    LDL --    HDL --    Trig 21 mg/dL     CAPILLARY BLOOD GLUCOSE      POCT Blood Glucose.: 133 mg/dL (03 Sep 2023 08:01)  POCT Blood Glucose.: 136 mg/dL (03 Sep 2023 04:46)  POCT Blood Glucose.: 143 mg/dL (03 Sep 2023 00:45)  POCT Blood Glucose.: 181 mg/dL (02 Sep 2023 21:06)  POCT Blood Glucose.: 227 mg/dL (02 Sep 2023 17:10)  POCT Blood Glucose.: 219 mg/dL (02 Sep 2023 15:53)  POCT Blood Glucose.: 196 mg/dL (02 Sep 2023 12:38)    Skin: midline thoracic spine II, right hip I, sacrum I. Richard 8.     Estimated Needs:   [x ] no change since previous assessment: Based off IBW 100lbs. 1132-1359kcal (25-30kcal/kg IBW), 45-54gm protein(1-1.2gm/kg IBW)  [ ] recalculated:     Previous Nutrition Diagnosis:   [ ] Inadequate Energy Intake [ ]Inadequate Oral Intake [ ] Excessive Energy Intake   [ ] Underweight [ ] Increased Nutrient Needs [ ] Overweight/Obesity   [ ] Altered GI Function [ ] Unintended Weight Loss [ ] Food & Nutrition Related Knowledge Deficit [x ] Malnutrition     Nutrition Diagnosis is [x ] ongoing  [ ] resolved [ ] not applicable     New Nutrition Diagnosis: [x ] not applicable       Interventions:   Recommend  [ ] Change Diet To:  [ ] Nutrition Supplement  [x ] Nutrition Support: Recommend changing TF formula to Vital 1.5 @20cc/hr & increase by 10cc q 24hrs until goal rate 40cc/hr x 20hrs (1200kcal, 54gm protein).  [x ] Other: Electrolyte replacement per MD order. Free water flush 150cc q 6hrs.     Monitoring and Evaluation:   [ ] PO intake [ x ] Tolerance to diet prescription [ x ] weights [ x ] labs[ x ] follow up per protocol  [ ] other:

## 2023-09-03 NOTE — PROGRESS NOTE ADULT - ASSESSMENT
52yo F with PMH anemia, hypothyroidism, osteoporosis, chronic hyponatremia, depression, anxiety presented to ED on 8/27/23 due to weakness and hypoglycemia. Admitted to medicine for weakness and fall, pancytopenia.    1) Acute respiratory failure with hypoxia.   -Patient s/p RRT on 8/28 for unresponsiveness and hypoglycemic episode- intubated for airway protection and transferred to ICU  -Weaned off vent to NC- monitored resp status closely- went into respiratory distress again and was re-intubated  -getting CPAP trials   -plan per ICU.    2) Septic Shock  - Likely due to pneumonia  - ET Tube culture with Stenotrophomonas   - Blood culture x 1 with Strep  - Follow repeat cultures  - Cefepime was changed to Levaquin   - ID Consult appreciated   - Management per ICU    3) Pancytopenia   - History of leukopenia and anemia, now worsened likely due to FTT / Sepsis   - s/p 3 PRBCs and 1 Platelets   - Hem consult noted     4) Hypoglycemia.   -likely due to poor PO intake + sepsis   -Monitor closely     5) Electrolyte imbalance.   - Correction per ICU  - Renal recs noted     6) Weakness.   - Patient presented due to weakness, found to be hypoglycemic by EMS to 48, hyperglycemic on arrival. recent fall on 8/23 leading to broken wrist. concern for malnutrition vs gait disturbance vs deconditioning    7) Left Radius Fracture  - NWB LUE in splint  - Ortho consult noted     8) Hypothyroidism.   - TSH elevated  - Free T4 normal   - Management per ICU    9) Transaminitis.   - Likely due to shock liver   - Monitor   - Improving     10) History of behavioral and mental health problems.   - behavioral health consulted, recorded history of schizoid personality disorder, anorexia, and hospitalization over 20 years ago.    DVT Prophylaxis -- No need for chemical prophylaxis due to anemia / thrombocytopenia.    Management per ICU.

## 2023-09-03 NOTE — CONSULT NOTE ADULT - CONSULT REQUESTED DATE/TIME
27-Aug-2023 19:43
28-Aug-2023 07:41
28-Aug-2023 14:21
03-Sep-2023 12:54
29-Aug-2023 12:28
30-Aug-2023 12:32

## 2023-09-03 NOTE — PROCEDURE NOTE - NSINDICATIONS_GEN_A_CORE
critical patient/monitoring purposes
feeds
emergency venous access
critical patient/monitoring purposes
emergency venous access
airway protection/critical patient/respiratory failure

## 2023-09-03 NOTE — PROCEDURE NOTE - PROCEDURE DATE TIME, MLM
31-Aug-2023 14:10
31-Aug-2023 10:30
30-Aug-2023 14:00
02-Sep-2023 17:30
31-Aug-2023 11:30
03-Sep-2023 09:23

## 2023-09-03 NOTE — CONSULT NOTE ADULT - CONSULT REASON
Hyponatreema, Hypokalemia
Hypoxic respiratory failure
L Distal Rad Fx
Pancytopepnia
Pneumonia
Bilateral Lower Leg

## 2023-09-03 NOTE — PROGRESS NOTE ADULT - SUBJECTIVE AND OBJECTIVE BOX
INTERVAL HPI/OVERNIGHT EVENTS: No acute overnight events occurred.    SUBJECTIVE: Seen and examined pt at bedside. Sedated and intubated.     Review of Systems: Unable to obtain - patient is intubated     ICU Vital Signs Last 24 Hrs  T(C): 35.9 (03 Sep 2023 07:00), Max: 36.3 (03 Sep 2023 04:00)  T(F): 96.6 (03 Sep 2023 07:00), Max: 97.3 (03 Sep 2023 04:00)  HR: 97 (03 Sep 2023 10:00) (66 - 97)  BP: 119/81 (03 Sep 2023 09:00) (90/65 - 126/83)  BP(mean): 96 (03 Sep 2023 09:00) (73 - 99)  ABP: 134/84 (02 Sep 2023 18:00) (96/61 - 134/84)  ABP(mean): 105 (02 Sep 2023 18:00) (75 - 105)  RR: 18 (03 Sep 2023 10:00) (8 - 23)  SpO2: 95% (03 Sep 2023 10:00) (91% - 100%)    O2 Parameters below as of 03 Sep 2023 10:00  Patient On (Oxygen Delivery Method): ventilator    O2 Concentration (%): 30        PHYSICAL EXAM:  Gen: emaciated female  Neuro: sedated  HEENT: intubated                        10.2   2.45  )-----------( 89       ( 02 Sep 2023 18:25 )             30.3       09-02    147<H>  |  120<H>  |  48<H>  ----------------------------<  193<H>  2.9<LL>   |  22  |  0.70    Ca    6.4<LL>      02 Sep 2023 18:25  Phos  3.3     09-02  Mg     2.2     09-02    TPro  3.9<L>  /  Alb  2.0<L>  /  TBili  1.7<H>  /  DBili  x   /  AST  182<H>  /  ALT  173<H>  /  AlkPhos  93  09-02          ABG - ( 01 Sep 2023 10:31 )  pH, Arterial: 7.28  pH, Blood: x     /  pCO2: 42    /  pO2: 62    / HCO3: 20    / Base Excess: -7.0  /  SaO2: 90.1        PT/INR - ( 02 Sep 2023 10:10 )   PT: 15.8 sec;   INR: 1.36 ratio         PTT - ( 02 Sep 2023 10:10 )  PTT:41.6 sec    ETT culture results:  Moderate Stentotrophomonas Maltophilia, prelim results.   Resp: good air entry b/l  Cardio: rrr  Abd: soft, nt, distension improved  Ext: scattered ecchmyoses, LUE with splint in place  Skin: as above, warm, dry      Meds:  cefepime   IVPB IV Intermittent    norepinephrine Infusion IV Continuous    dextrose 50% Injectable IV Push  dextrose 50% Injectable IV Push  dextrose 50% Injectable IV Push  dextrose Oral Gel Oral  glucagon  Injectable IntraMuscular      fentaNYL    Injectable IV Push PRN  propofol Infusion IV Continuous    dextrose 5% + lactated ringers. IV Continuous  multivitamin/minerals/iron Oral Solution (CENTRUM) Oral  thiamine IVPB IV Intermittent      chlorhexidine 0.12% Liquid Oral Mucosa  chlorhexidine 2% Cloths Topical    Radiology: no new imaging    Bedside Ultrasound: n/a    Tubes/Lines: PIV, L femoral central line, L fem A line, ET tube, NGT      GLOBAL ISSUE/BEST PRACTICE:  Analgesia: N   Sedation: off sedation.   HOB elevation: Y  Stress ulcer prophylaxis: Y   VTE prophylaxis: Y  Glycemic control: N  Nutrition: diet NPO with tube feeds    CODE STATUS:  FULL CODE    54 y/o F pmhx of anemia, osteoporosis, depression, anxiety, chronic hyponatremia, w/ multiple admissions for malnutrition failure to thrive presented with weakness and hypoglycemia. Patient admitted to MICU with metabolic encephalopathy and acute hypoxic respiratory failure requiring re-intubation.    Neuro: metabolic encephalopathy likely secondary to severe malnutrition   --attempt to dc propofol  Pulm: AHRF requiring reintubation on AC/CMV at 16/300/5/50%  --wean as tolerated  Cardio: septic shock likely from pna, no longer requiring pressor support.    GI: abd distension improved, AXR 9/1/23 without evidence of bowel obstruction. cont diet NPO with tube feeds, inc to 20ml / hr today  --chronic malnutrition: hypoalbuminemia   --elevated lfts, likely in setting of shock   --elevated tbili-> downtrending  Renal: likely MYRNA given patients baseline Cr is low. cont mIVF with D5LR  --monitor UOP w/indwelling catheter, making good UOP overnight  --electrolytes reviewed from AM, replete PRN  Endo: with hypoglycemic episodes, blood glu levels improved. monitor blood glucose q4hrs. cont mIVF w/ D5  --hypocalcemia, hyperparathyroidism, will hold off on Ca++ supp; monitor for refeeding syndrome.    MSK: 8/24 L distal radius fx: comminuted, relatively non-displaced   --seen by ortho, splint placed, confirmed by xray  Heme: anemic, s/p 1u pRBC, SCDs for vte ppx for now  --AM cbc reviewed with repeated cbc, stable H/H; will monitor.    ID: likely septic shock in setting of pna, Consult ID Re: ETT tube findings.  Begin Bactrim; weight based doing, stop cefepime.    Dispo: full code   INTERVAL HPI/OVERNIGHT EVENTS: No acute overnight events occurred.    SUBJECTIVE: Seen and examined pt at bedside. Sedated and intubated.     Review of Systems: Unable to obtain - patient is intubated     ICU Vital Signs Last 24 Hrs  T(C): 35.9 (03 Sep 2023 07:00), Max: 36.3 (03 Sep 2023 04:00)  T(F): 96.6 (03 Sep 2023 07:00), Max: 97.3 (03 Sep 2023 04:00)  HR: 97 (03 Sep 2023 10:00) (66 - 97)  BP: 119/81 (03 Sep 2023 09:00) (90/65 - 126/83)  BP(mean): 96 (03 Sep 2023 09:00) (73 - 99)  ABP: 134/84 (02 Sep 2023 18:00) (96/61 - 134/84)  ABP(mean): 105 (02 Sep 2023 18:00) (75 - 105)  RR: 18 (03 Sep 2023 10:00) (8 - 23)  SpO2: 95% (03 Sep 2023 10:00) (91% - 100%)    O2 Parameters below as of 03 Sep 2023 10:00  Patient On (Oxygen Delivery Method): ventilator    O2 Concentration (%): 30        PHYSICAL EXAM:  Gen: emaciated female  Neuro: sedated  HEENT: intubated                        10.2   2.45  )-----------( 89       ( 02 Sep 2023 18:25 )             30.3       09-02    147<H>  |  120<H>  |  48<H>  ----------------------------<  193<H>  2.9<LL>   |  22  |  0.70    Ca    6.4<LL>      02 Sep 2023 18:25  Phos  3.3     09-02  Mg     2.2     09-02    TPro  3.9<L>  /  Alb  2.0<L>  /  TBili  1.7<H>  /  DBili  x   /  AST  182<H>  /  ALT  173<H>  /  AlkPhos  93  09-02          ABG - ( 01 Sep 2023 10:31 )  pH, Arterial: 7.28  pH, Blood: x     /  pCO2: 42    /  pO2: 62    / HCO3: 20    / Base Excess: -7.0  /  SaO2: 90.1        PT/INR - ( 02 Sep 2023 10:10 )   PT: 15.8 sec;   INR: 1.36 ratio         PTT - ( 02 Sep 2023 10:10 )  PTT:41.6 sec      I&O's Detail    02 Sep 2023 07:01  -  03 Sep 2023 07:00  --------------------------------------------------------  IN:    dextrose 5% + lactated ringers: 600 mL    Enteral Tube Flush: 400 mL    IV PiggyBack: 100 mL    IV PiggyBack: 100 mL    IV PiggyBack: 50 mL    IV PiggyBack: 50 mL    IV PiggyBack: 300 mL    Nepro with Carb Steady: 410 mL    Norepinephrine: 75 mL    Platelets - Single Donor: 250 mL    PRBCs (Packed Red Blood Cells): 300 mL    Propofol: 0.8 mL  Total IN: 2635.8 mL    OUT:    Indwelling Catheter - Urethral (mL): 1350 mL    Rectal Tube (mL): 100 mL  Total OUT: 1450 mL    Total NET: 1185.8 mL      03 Sep 2023 07:01  -  03 Sep 2023 11:11  --------------------------------------------------------  IN:    Enteral Tube Flush: 100 mL    Nepro with Carb Steady: 40 mL  Total IN: 140 mL    OUT:    Indwelling Catheter - Urethral (mL): 175 mL    Norepinephrine: 0 mL    Propofol: 0 mL  Total OUT: 175 mL    Total NET: -35 mL        ETT culture results:  Moderate Stentotrophomonas Maltophilia, prelim results.   Resp: good air entry b/l  Cardio: rrr  Abd: soft, nt, distension improved  Ext: scattered ecchmyoses, LUE with splint in place  Skin: as above, warm, dry      Meds:  cefepime   IVPB IV Intermittent    norepinephrine Infusion IV Continuous    dextrose 50% Injectable IV Push  dextrose 50% Injectable IV Push  dextrose 50% Injectable IV Push  dextrose Oral Gel Oral  glucagon  Injectable IntraMuscular      fentaNYL    Injectable IV Push PRN  propofol Infusion IV Continuous    dextrose 5% + lactated ringers. IV Continuous  multivitamin/minerals/iron Oral Solution (CENTRUM) Oral  thiamine IVPB IV Intermittent      chlorhexidine 0.12% Liquid Oral Mucosa  chlorhexidine 2% Cloths Topical    Radiology: no new imaging    Bedside Ultrasound: n/a    Tubes/Lines: PIV, L femoral central line, L fem A line, ET tube, NGT; to establish A line.       GLOBAL ISSUE/BEST PRACTICE:  Analgesia: N   Sedation: off sedation.   HOB elevation: Y  Stress ulcer prophylaxis: Y   VTE prophylaxis: Y  Glycemic control: N  Nutrition: diet NPO with tube feeds    CODE STATUS:  FULL CODE    52 y/o F pmhx of anemia, osteoporosis, depression, anxiety, chronic hyponatremia, w/ multiple admissions for malnutrition failure to thrive presented with weakness and hypoglycemia. Patient admitted to MICU with metabolic encephalopathy and acute hypoxic respiratory failure requiring re-intubation.    Neuro: metabolic encephalopathy likely secondary to severe malnutrition   --attempt to dc propofol  Pulm: AHRF requiring reintubation on AC/CMV at 16/300/5/50%  --wean as tolerated  Cardio: septic shock likely from pna, no longer requiring pressor support.    GI: abd distension improved, AXR 9/1/23 without evidence of bowel obstruction. cont diet NPO with tube feeds, inc to 20ml / hr today  --chronic malnutrition: hypoalbuminemia   --elevated lfts, likely in setting of shock   --elevated tbili-> downtrending  Renal: likely MYRNA given patients baseline Cr is low. cont mIVF with D5LR  --monitor UOP w/indwelling catheter, making good UOP overnight  --electrolytes reviewed from AM, replete PRN  Endo: with hypoglycemic episodes, blood glu levels improved. monitor blood glucose q4hrs. cont mIVF w/ D5  --hypocalcemia, hyperparathyroidism, will hold off on Ca++ supp; monitor for refeeding syndrome.    MSK: 8/24 L distal radius fx: comminuted, relatively non-displaced   --seen by ortho, splint placed, confirmed by xray  Heme: anemic, s/p 1u pRBC, SCDs for vte ppx for now  --AM cbc reviewed with repeated cbc, stable H/H; will monitor.    ID: likely septic shock in setting of pna, Consult ID Re: ETT tube findings.  Begin Bactrim; weight based doing, stop cefepime.    Dispo: full code

## 2023-09-04 NOTE — CHART NOTE - NSCHARTNOTEFT_GEN_A_CORE
Called to bedside by RN, pt with only one 1 PIV in the R. EJ, pt seen and examined unable to use both hands for PIV access, unable to use LE, will avoid groin access at this time, pt is HDS but vented. She is not on any drips at this time. There is no indication for central line access.  Attempted to place another PIV in the EJ on the L. side but unsuccessful. Pt L. IJ was checked with US and her L.IJ is extremely superficial given she is very thin/cachectic w/ an BMI of 12, decision was made to placed 20G PIV under US guidance into the L. IJ, secured properly and notified bedside nurse to monitor site.

## 2023-09-04 NOTE — PROGRESS NOTE ADULT - SUBJECTIVE AND OBJECTIVE BOX
Interval Events:  Remains intubated  Non-responsive off sedation    REVIEW OF SYSTEMS:  [ ] All other systems negative  [x] Unable to assess ROS due to poor mentation    OBJECTIVE:  ICU Vital Signs Last 24 Hrs  T(C): 35.4 (04 Sep 2023 08:09), Max: 36 (04 Sep 2023 00:15)  T(F): 95.7 (04 Sep 2023 08:09), Max: 96.8 (04 Sep 2023 00:15)  HR: 85 (04 Sep 2023 14:00) (71 - 106)  BP: --  BP(mean): --  ABP: 117/79 (04 Sep 2023 14:00) (83/60 - 126/81)  ABP(mean): 94 (04 Sep 2023 14:00) (69 - 98)  RR: 15 (04 Sep 2023 14:00) (15 - 39)  SpO2: 96% (04 Sep 2023 14:00) (95% - 100%)    O2 Parameters below as of 04 Sep 2023 07:00  Patient On (Oxygen Delivery Method): ventilator    O2 Concentration (%): 30      Mode: CPAP with PS, FiO2: 30, PEEP: 5, MAP: 7, PIP: 11    09-03 @ 07:01 - 09-04 @ 07:00  --------------------------------------------------------  IN: 1390 mL / OUT: 830 mL / NET: 560 mL    09-04 @ 07:01 - 09-04 @ 15:14  --------------------------------------------------------  IN: 190 mL / OUT: 50 mL / NET: 140 mL      CAPILLARY BLOOD GLUCOSE      POCT Blood Glucose.: 181 mg/dL (04 Sep 2023 10:07)      PHYSICAL EXAM:  General: Intubated, sedated, cachectic  HEENT: NC/AT   Neck: Supple  Respiratory: CTAB. No wheezing.  Cardiovascular: RRR. No murmur. No edema  Abdomen: Soft, nondistended  Extremities: Warm  Neurological: A&Ox0      [facility] MEDICATIONS:    levoFLOXacin IVPB          dextrose 50% Injectable 25 Gram(s) IV Push once  dextrose 50% Injectable 25 Gram(s) IV Push once  dextrose 50% Injectable 12.5 Gram(s) IV Push once  dextrose Oral Gel 15 Gram(s) Oral once  glucagon  Injectable 1 milliGRAM(s) IntraMuscular once              multivitamin/minerals/iron Oral Solution (CENTRUM) 15 milliLiter(s) Oral daily  thiamine IVPB 200 milliGRAM(s) IV Intermittent daily      chlorhexidine 0.12% Liquid 15 milliLiter(s) Oral Mucosa every 12 hours  chlorhexidine 2% Cloths 1 Application(s) Topical <User Schedule>        LABS:                        9.2    1.51  )-----------( 39       ( 04 Sep 2023 05:40 )             28.3     Hgb Trend: 9.2<--, 10.6<--, 10.2<--, 6.9<--, 7.4<--  09-04    147<H>  |  122<H>  |  53<H>  ----------------------------<  195<H>  4.9   |  22  |  0.73    Ca    6.6<L>      04 Sep 2023 05:40  Phos  3.0     09-04  Mg     1.9     09-04    TPro  4.4<L>  /  Alb  1.8<L>  /  TBili  0.5  /  DBili  x   /  AST  67<H>  /  ALT  109<H>  /  AlkPhos  96  09-04    Creatinine Trend: 0.73<--, 0.69<--, 0.72<--, 0.70<--, 0.66<--, 0.69<--  PT/INR - ( 04 Sep 2023 05:40 )   PT: 12.0 sec;   INR: 1.03 ratio         PTT - ( 04 Sep 2023 05:40 )  PTT:31.3 sec  Urinalysis Basic - ( 04 Sep 2023 05:40 )    Color: x / Appearance: x / SG: x / pH: x  Gluc: 195 mg/dL / Ketone: x  / Bili: x / Urobili: x   Blood: x / Protein: x / Nitrite: x   Leuk Esterase: x / RBC: x / WBC x   Sq Epi: x / Non Sq Epi: x / Bacteria: x

## 2023-09-04 NOTE — PROGRESS NOTE ADULT - ASSESSMENT
MYRNA  Hypernatremia s/p hyponatremia  Hypokalemia/hyperkalemia   Hypothyroidism  FTT  Anemia   Acute respiratory failure   Bradycardia  Hyperphosphatemia     -MYRNA likely from hypoglycemic event, improving  -Suspect low solute in take, tea and toast physiology  -Urine indices reviewed  -PRBCs per primary  -TSH elevated, check T4, could have also caused hyponatremia  -Deemed not to have capacity by psych; now intubated however  -Sodium labile; trend improving again  -FW with tube feeds; consider increasing FW to 150cc q6hrs  -Monitor for refeeding when using GI tract; daily phos level  -Hyperchloremic acidosis, mild - monitor for now  -Mir erinn 8; s/p replacement per ICU  -Vent management per ICU  -Will check Fek if the potassium is recurrently low

## 2023-09-04 NOTE — PROGRESS NOTE ADULT - ASSESSMENT
52yo F with PMH anemia, hypothyroidism, osteoporosis, chronic hyponatremia, depression, anxiety presented to ED on 8/27/23 due to weakness and hypoglycemia. Admitted to medicine for weakness and fall, pancytopenia.    1) Acute respiratory failure with hypoxia.   -Patient s/p RRT on 8/28 for unresponsiveness and hypoglycemic episode- intubated for airway protection and transferred to ICU  -Weaned off vent to NC- monitored resp status closely- went into respiratory distress again and was re-intubated  -getting CPAP trials   -plan per ICU.    2) Septic Shock  - Likely due to pneumonia  - ET Tube culture with Stenotrophomonas   - Blood culture x 1 with Strep  - Follow repeat cultures  - Cefepime was changed to Levaquin   - ID Consult appreciated   - Management per ICU    3) Pancytopenia   - History of leukopenia and anemia, now worsened likely due to FTT / Sepsis   - s/p 3 PRBCs and 1 Platelets   - Hem consult noted     4) Hypoglycemia.   -likely due to poor PO intake + sepsis   -Monitor closely     5) Electrolyte imbalance.   - Correction per ICU  - Renal recs noted     6) Weakness.   - Patient presented due to weakness, found to be hypoglycemic by EMS to 48, hyperglycemic on arrival. recent fall on 8/23 leading to broken wrist. concern for malnutrition vs gait disturbance vs deconditioning    7) Left Radius Fracture  - NWB LUE in splint  - Ortho consult noted     8) Hypothyroidism.   - TSH elevated  - Free T4 normal   - Management per ICU    9) Transaminitis.   - Likely due to shock liver   - Monitor   - Improving     10) History of behavioral and mental health problems.   - behavioral health consulted, recorded history of schizoid personality disorder, anorexia, and hospitalization over 20 years ago.    DVT Prophylaxis -- No need for chemical prophylaxis due to anemia / thrombocytopenia.    Management per ICU.     Prognosis guarded.

## 2023-09-04 NOTE — PROGRESS NOTE ADULT - SUBJECTIVE AND OBJECTIVE BOX
[facility]  INFECTIOUS DISEASES   [address] [place] [address]  Tel: [phone]     Fax: [phone]  ========================================================  MD Roldan Delong Kaushal, MD Cho, Michelle, MD Sunjit, Jaspal, MD  ========================================================    [id number]  KYLE NAJERA     Follow up:  fall, pancytopenia and Acinetobacter Pneumonia?     No new changes, still intubated. No fever.    PAST MEDICAL & SURGICAL HISTORY:  Anemia  Depression  Chronic musculoskeletal pain  Anorexia  at age 15 yrs  No significant past surgical history    Social Hx: No smoking, EtOH or drugs     FAMILY HISTORY:  No pertinent family history in first degree relatives    Allergies  No Known Allergies    MEDICATIONS  (STANDING):  chlorhexidine 0.12% Liquid 15 milliLiter(s) Oral Mucosa every 12 hours  chlorhexidine 2% Cloths 1 Application(s) Topical <User Schedule>  dextrose 50% Injectable 25 Gram(s) IV Push once  dextrose 50% Injectable 25 Gram(s) IV Push once  dextrose 50% Injectable 12.5 Gram(s) IV Push once  dextrose Oral Gel 15 Gram(s) Oral once  glucagon  Injectable 1 milliGRAM(s) IntraMuscular once  levoFLOXacin IVPB      multivitamin/minerals/iron Oral Solution (CENTRUM) 15 milliLiter(s) Oral daily  thiamine IVPB 200 milliGRAM(s) IV Intermittent daily    REVIEW OF SYSTEMS:  Unable     Physical Exam:  Vital Signs Last 24 Hrs  T(C): 35.4 (04 Sep 2023 08:09), Max: 36 (04 Sep 2023 00:15)  T(F): 95.7 (04 Sep 2023 08:09), Max: 96.8 (04 Sep 2023 00:15)  HR: 85 (04 Sep 2023 12:13) (71 - 105)  RR: 15 (04 Sep 2023 11:00) (15 - 39)  SpO2: 97% (04 Sep 2023 12:13) (95% - 100%)  Parameters below as of 04 Sep 2023 07:00  Patient On (Oxygen Delivery Method): ventilator  O2 Concentration (%): 30  GEN: NAD, intubated, frail and cachectic   HEENT: normocephalic and atraumatic.   NECK: Supple.  No lymphadenopathy   LUNGS: Coarse breath sounds   HEART: Regular rate and rhythm   ABDOMEN: Soft, nondistended.  Positive bowel sounds.    EXTREMITIES: trace edema  NEUROLOGIC: unable  SKIN: No rash     Labs:                        9.2    1.51  )-----------( 39       ( 04 Sep 2023 05:40 )             28.3     09-04    147<H>  |  122<H>  |  53<H>  ----------------------------<  195<H>  4.9   |  22  |  0.73    Ca    6.6<L>      04 Sep 2023 05:40  Phos  3.0     09-04  Mg     1.9     09-04    TPro  4.4<L>  /  Alb  1.8<L>  /  TBili  0.5  /  DBili  x   /  AST  67<H>  /  ALT  109<H>  /  AlkPhos  96  09-04    Culture - Sputum (collected 09-01-23 @ 09:19)  Source: ET Tube ET Tube  Gram Stain (09-01-23 @ 23:08):    Numerous polymorphonuclear leukocytes per low power field    No Squamous epithelial cells per low power field    Numerous Gram Negative Rods per oil power field    Numerous Gram positive cocci in pairs per oil power field    Few Gram Positive Rods per oil power field  Final Report (09-03-23 @ 18:55):    Mixed gram negative rods including    Moderate Stenotrophomonas maltophilia  Organism: Stenotrophomonas maltophilia (09-03-23 @ 18:55)  Organism: Stenotrophomonas maltophilia (09-03-23 @ 18:55)    Sensitivities:      Method Type: KB      -  Minocycline: S  Organism: Stenotrophomonas maltophilia (09-03-23 @ 18:55)    Sensitivities:      Method Type: CHRISTIANO      -  Levofloxacin: S <=0.5      -  Trimethoprim/Sulfamethoxazole: S <=0.5/9.5    Culture - Blood (collected 08-31-23 @ 13:20)  Source: .Blood Blood-Peripheral  Gram Stain (09-01-23 @ 12:53):    Growth in anaerobic bottle: Gram Positive Cocci in Pairs and Chains    Growth in aerobic bottle: Gram Positive Cocci in Pairs and Chains  Final Report (09-02-23 @ 15:57):    Growth in aerobic and anaerobic bottles: Streptococcus mitis/oralis group    Alpha hemolytic streptoccous (Not Streptococcus pneumoniae or    Enterococcus)    isolated from a single blood culture set may represent contamination.    Contact the MicrobiologyDepartment at [phone] if susceptibility    testing is clinically indicated.    Direct identification is available within approximately 3-5    hours either by Blood Panel Multiplexed PCR or Direct    MALDI-TOF. Details: [email or web address]  Organism: Blood Culture PCR (09-02-23 @ 15:57)  Organism: Blood Culture PCR (09-02-23 @ 15:57)    Sensitivities:      Method Type: PCR      -  Streptococcus sp. (Not Grp A, B or S pneumoniae): Detec    Culture - Blood (collected 08-31-23 @ 13:15)  Source: .Blood Blood-Peripheral    Culture - Urine (collected 08-27-23 @ 13:20)  Source: Clean Catch Clean Catch (Midstream)  Final Report (08-28-23 @ 18:35):    <10,000 CFU/mL Normal Urogenital Micki    WBC Count: 1.51 K/uL (09-04-23 @ 05:40)  WBC Count: 1.16 K/uL (09-03-23 @ 09:55)  WBC Count: 2.45 K/uL (09-02-23 @ 18:25)  WBC Count: 1.25 K/uL (09-02-23 @ 09:45)  WBC Count: 0.69 K/uL (09-02-23 @ 06:00)  WBC Count: 1.48 K/uL (09-01-23 @ 07:07)  WBC Count: 2.27 K/uL (08-31-23 @ 19:25)  WBC Count: 1.63 K/uL (08-31-23 @ 13:05)  WBC Count: 3.32 K/uL (08-31-23 @ 05:40)    Creatinine: 0.73 mg/dL (09-04-23 @ 05:40)  Creatinine: 0.69 mg/dL (09-03-23 @ 19:00)  Creatinine: 0.72 mg/dL (09-03-23 @ 09:55)  Creatinine: 0.70 mg/dL (09-02-23 @ 18:25)  Creatinine: 0.66 mg/dL (09-02-23 @ 09:45)  Creatinine: 0.69 mg/dL (09-02-23 @ 06:00)  Creatinine: 0.72 mg/dL (09-01-23 @ 22:00)  Creatinine: 0.71 mg/dL (09-01-23 @ 14:50)  Creatinine: 0.65 mg/dL (09-01-23 @ 07:07)  Creatinine: 0.47 mg/dL (08-31-23 @ 19:25)  Creatinine: 0.44 mg/dL (08-31-23 @ 13:05)  Creatinine: 0.36 mg/dL (08-31-23 @ 09:09)  Creatinine: 0.39 mg/dL (08-31-23 @ 05:40)  Creatinine: 0.40 mg/dL (08-30-23 @ 22:23)    Ferritin: 430 ng/mL (08-27-23 @ 12:20)     COVID-19 PCR: NotDetec (08-31-23 @ 11:40)    All imaging and other data have been reviewed.  < from: CT Chest w/ IV Cont (08.31.23 @ 15:10) >  IMPRESSION:  Small right pleural effusion and associated compressive atelectasis.  Small to moderate left-sided pleural effusion with extensive partial   atelectasis left lower lobe.  Patchy airspace consolidation left upper lobe with peribronchial   opacities right upper lobe, likely related to infection/pneumonia.  CT findings as discussed which are suggestive of hypoperfusion complex   (shock bowel).  No CT evidence for acute gastrointestinal bleeding.  Other findings as discussed above.    Assessment and Plan:   52yo woman with PMH of anorexia nervosa for more than 30yers, FTT with low BMP, hypothyroidism, osteoporosis, chronic hyponatremia was admitted on 8/27 after a fall, found to be pancytopenic.   She also had hypoglycemia. Patient's sister is in the bedside and reports that she has been getting worse for the last 6 months and has become increasingly more frail. Patient lives alone with aides.  On admission no fever, chills, chest pain, palpitations, SOB, cough, abdominal pain, nausea, vomiting, diarrhea, constipation, urinary frequency, urgency, or dysuria, headaches.  On 8/28 became hypoglycemic and unresponsive, so was intubated and was transferred to MICU. On 8/30 extubated but reintubated on 8/31 again.   Since then has worsening of pancytopenia and also, has developed pneumonia. ET tube culture showed Stenotrophomonas.     Even though her pancytopenia could be related to poor nutrition but since she used to go out for daily walkings as per sister, will rule out tick borne diseases, no rash or bug bites as far as sister knows.   She also had strep bacteremia that could be real infection, so will rule out endocarditis causing pancytopenia. No fever but she doesn;t have a normal immune system to have normal immune response.   ET culture with Stenotrophomonas could be colonization in tube or real, since she is very sick will start her on Levaquin. Bactrim is the drug of choice but will cause more bone marrow suppression.   Even though cQT is prolonged but since she is intubated on monitor, levaquin would be a better choice, for any reason unable to continue levaquin, can start Minocycline.     # Malnutrition   # Pancytopenia  # Respiratory failue  # Strep Bacteremia   # Stenotrophomonas pneumonia     - Repeat blood cultures x 2 pending   - Will follow tick PCR and serology  - Monitor CBC and ECG for cQT  - Continue Levaquin 750mg daily (covers acinetobacter pneumonia and strep bacteremia)   - TTE pending     Will follow.  Discussed with sister at the bedside.     Adelia Peralta MD  Division of Infectious Diseases   Please call ID service at [phone] with any question.    35 minutes spent on total encounter assessing patient, examination, chart review, counseling and coordinating care by the attending physician/nurse/care manager.

## 2023-09-04 NOTE — PROGRESS NOTE ADULT - ASSESSMENT
53 year old female with with anorexia, severe malnutrition, hypothyroidism, osteoporosis with multiple fractures, and chronic hyponatremia, who presents with failure to thrive. Course complicated by hypoglycemia, bradycardia, encephalopathy, and hypoxia requiring multiple intubations.     Acute metabolic encephalopathy  Acute hypoxemic respiratory failure  Septic shock  Stenotrophomonas pneumonia  Anorexia with severe malnutrition  Pancytopenia  Electrolyte derangements  Acute kidney injury  Distal radius fx    NEURO: Acute metabolic encephalopathy. CTH 9/4 negative for bleed. Continue to hold sedation.  PULM: Reintubated 8/31 for hypoxic respiratory failure. Continue vent support given poor mentation. Tolerating PST today.  CVS: Septic shock requiring vasopressors. Now off. BP improved.  GI: Continue trickle feeds via NGT  RENAL: Electrolyte derangements improving, continue to monitor and supplement as needed. Free water flushes for hypernatremia  HEME: Pancytopenia, suspect bone marrow suppression from severe malnutrition.  ENDO: Monitor fingersticks  ID: Sputum Cx with stenotrophomonas. One set of BCx with strep mitis. Follow up repeat BCx. Continue levofloxacin. Check TTE.  MSK: Distal radius fx s/p splint  CODE: Full code.  SKIN: Multiple pressure ulcers and skin tears  LINES: ETT, NGT; L axillary a line    Prognosis guarded    Family updated at bedside 53 year old female with with anorexia, severe malnutrition, hypothyroidism, osteoporosis with multiple fractures, and chronic hyponatremia, who presents with failure to thrive. Course complicated by hypoglycemia, bradycardia, encephalopathy, and hypoxia requiring multiple intubations.     Acute metabolic encephalopathy  Acute hypoxemic respiratory failure  Septic shock  Stenotrophomonas pneumonia  Anorexia   Severe protein-calorie malnutrition  Pancytopenia  Electrolyte derangements  Acute kidney injury  Distal radius fx    NEURO: Acute metabolic encephalopathy. CTH 9/4 negative for bleed. Continue to hold sedation.  PULM: Reintubated 8/31 for hypoxic respiratory failure. Continue vent support given poor mentation. Tolerating PST today.  CVS: Septic shock requiring vasopressors. Now off. BP improved.  GI: Continue trickle feeds via NGT  RENAL: Electrolyte derangements improving, continue to monitor and supplement as needed. Free water flushes for hypernatremia  HEME: Pancytopenia, suspect bone marrow suppression from severe malnutrition.  ENDO: Monitor fingersticks  ID: Sputum Cx with stenotrophomonas. One set of BCx with strep mitis. Follow up repeat BCx. Continue levofloxacin. Check TTE. Tick panel pending.  MSK: Distal radius fx s/p splint  CODE: Full code.  SKIN: Multiple pressure ulcers and skin tears  LINES: ETT, NGT; L axillary a line    Prognosis guarded    Family updated at bedside

## 2023-09-04 NOTE — PROGRESS NOTE ADULT - SUBJECTIVE AND OBJECTIVE BOX
Patient is a 53y old  Female who presents with a chief complaint of fall, pancytopenia (27 Aug 2023 17:14)       HPI:  52yo F with PMH anemia, hypothyroidism, osteoporosis, chronic hyponatremia, depression, anxiety presents to ED on 8/27/23 due to weakness and hypoglycemia.  Patient was seen at this emergency room 8/24 ago for a fall at physical therapy 8/23.  Was diagnosed with left wrist fracture at that time.  Splint was applied and seen by orthopedics.  Recommended admission due to fall risk and unsafe discharge.  Patient refused admission and left AMA.  Patient was fully aware of  fall risk and all lab abnormalities.  States she wanted to follow-up outpatient.  Today patient became weak and aide assisted her down to the floor.  Denies hitting head or any injuries from the fall because aide assisted her.  EMS was called.  Sugar on arrival per EMS was 48 and given D10.  Sugar on arrival to emergency room was 171.  Patient denies any pain or complaints.  Denies any injuries from the fall.  At this time patient is refusing any treatment or test.  States she just wants to go home and eat.  States her sugar was low because she did not eat today but she says she will eat when she gets home. Patient removed splint that was placed 2 days ago and was not wearing splint today per EMS. Patient's sister reports that she has been getting worse for the last 6 months and has become increasingly more frail. Patient lives alone with aides as per sister. Patient has ritualistic eating habits and a diet which includes eggs, fish, and vegetables. She currently takes no medications.   Renal consulted for multiple electrolyte abnormalities.  She states she wants to urinate.  Denies any N/V/SOB.  Eating. States she had edema.     No acute events noted  Intubated       PAST MEDICAL & SURGICAL HISTORY:  Anemia      Depression      Chronic musculoskeletal pain      Anorexia  at age 15 yrs      No significant past surgical history           FAMILY HISTORY:  No pertinent family history in first degree relatives    [place]    Social History:Non smoker    MEDICATIONS  (STANDING):  cefepime   IVPB 2000 milliGRAM(s) IV Intermittent every 12 hours  chlorhexidine 0.12% Liquid 15 milliLiter(s) Oral Mucosa every 12 hours  chlorhexidine 2% Cloths 1 Application(s) Topical <User Schedule>  dextrose 50% Injectable 25 Gram(s) IV Push once  dextrose 50% Injectable 25 Gram(s) IV Push once  dextrose 50% Injectable 12.5 Gram(s) IV Push once  dextrose Oral Gel 15 Gram(s) Oral once  glucagon  Injectable 1 milliGRAM(s) IntraMuscular once  multivitamin/minerals/iron Oral Solution (CENTRUM) 15 milliLiter(s) Oral daily  norepinephrine Infusion 0.05 MICROgram(s)/kG/Min (2.5 mL/Hr) IV Continuous <Continuous>  propofol Infusion 20 MICROgram(s)/kG/Min (3.2 mL/Hr) IV Continuous <Continuous>  thiamine IVPB 200 milliGRAM(s) IV Intermittent daily    MEDICATIONS  (PRN):  fentaNYL    Injectable 25 MICROGram(s) IV Push every 4 hours PRN vent synchrony        Allergies    No Known Allergies    Intolerances         REVIEW OF SYSTEMS:  Intubated    ICU Vital Signs Last 24 Hrs  T(C): 35.4 (04 Sep 2023 08:09), Max: 36 (04 Sep 2023 00:15)  T(F): 95.7 (04 Sep 2023 08:09), Max: 96.8 (04 Sep 2023 00:15)  HR: 79 (04 Sep 2023 11:00) (71 - 105)  BP: --  BP(mean): --  ABP: 124/78 (04 Sep 2023 11:00) (83/60 - 126/81)  ABP(mean): 95 (04 Sep 2023 11:00) (69 - 98)  RR: 15 (04 Sep 2023 11:00) (15 - 39)  SpO2: 95% (04 Sep 2023 11:00) (95% - 100%)    O2 Parameters below as of 04 Sep 2023 07:00  Patient On (Oxygen Delivery Method): ventilator    O2 Concentration (%): 30          PHYSICAL EXAM:    GENERAL: cachectic, frail appearing; intubated  HEAD:  Atraumatic, Normocephalic  NERVOUS SYSTEM:  sedated  CHEST/LUNG: Clear to auscultation bilaterally  HEART: Regular rate and rhythm  EXTREMITIES:  No Edema  SKIN: + Ecchymosis LE      LABS:                                                9.2    1.51  )-----------( 39       ( 04 Sep 2023 05:40 )             28.3     09-04    147<H>  |  122<H>  |  53<H>  ----------------------------<  195<H>  4.9   |  22  |  0.73    Ca    6.6<L>      04 Sep 2023 05:40  Phos  3.0     09-04  Mg     1.9     09-04    TPro  4.4<L>  /  Alb  1.8<L>  /  TBili  0.5  /  DBili  x   /  AST  67<H>  /  ALT  109<H>  /  AlkPhos  96  09-04    PT/INR - ( 04 Sep 2023 05:40 )   PT: 12.0 sec;   INR: 1.03 ratio         PTT - ( 04 Sep 2023 05:40 )  PTT:31.3 sec  Urinalysis Basic - ( 04 Sep 2023 05:40 )    Color: x / Appearance: x / SG: x / pH: x  Gluc: 195 mg/dL / Ketone: x  / Bili: x / Urobili: x   Blood: x / Protein: x / Nitrite: x   Leuk Esterase: x / RBC: x / WBC x   Sq Epi: x / Non Sq Epi: x / Bacteria: x

## 2023-09-04 NOTE — PROGRESS NOTE ADULT - SUBJECTIVE AND OBJECTIVE BOX
Failure to thrive in adult    HPI:  54yo F with PMH anemia, hypothyroidism, osteoporosis, chronic hyponatremia, depression, anxiety presents to ED on 8/27/23 due to weakness and hypoglycemia.  Patient was seen at this emergency room 8/24 ago for a fall at physical therapy 8/23.  Was diagnosed with left wrist fracture at that time.  Splint was applied and seen by orthopedics.  Recommended admission due to fall risk and unsafe discharge.  Patient refused admission and left AMA.  Patient was fully aware of  fall risk and all lab abnormalities.  States she wanted to follow-up outpatient.  Today patient became weak and aide assisted her down to the floor.  Denies hitting head or any injuries from the fall because aide assisted her.  EMS was called.  Sugar on arrival per EMS was 48 and given D10.  Sugar on arrival to emergency room was 171.  Patient denies any pain or complaints.  Denies any injuries from the fall.  At this time patient is refusing any treatment or test.  States she just wants to go home and eat.  States her sugar was low because she did not eat today but she says she will eat when she gets home. Patient removed splint that was placed 2 days ago and was not wearing splint today per EMS. Patient's sister reports that she has been getting worse for the last 6 months and has become increasingly more frail. Patient lives alone with aides as per sister. Patient has ritualistic eating habits and a diet which includes eggs, fish, and vegetables. She currently takes no medications.     Denies fever, chills, chest pain, palpitations, SOB, cough, abdominal pain, nausea, vomiting, diarrhea, constipation, urinary frequency, urgency, or dysuria, headaches, changes in vision, dizziness, numbness, tingling.  Denies recent travel, recent antibiotic use, or sick contacts. (27 Aug 2023 17:14)    Interval History:  Patient was seen and examined at bedside around 9 am.  Tolerating CPAP trials.    Has been off sedation and pressors.      ROS:  Unable to obtain due to her underlying condition.     PHYSICAL EXAM:  Vital Signs   T(C): 36.1 (04 Sep 2023 16:00), Max: 36.1 (04 Sep 2023 16:00)  T(F): 97 (04 Sep 2023 16:00), Max: 97 (04 Sep 2023 16:00)  HR: 85 (04 Sep 2023 16:10) (71 - 106)  RR: 18 (04 Sep 2023 16:00) (15 - 39)  SpO2: 97% (04 Sep 2023 16:10) (95% - 100%)  Parameters below as of 04 Sep 2023 07:00  Patient On (Oxygen Delivery Method): ventilator  O2 Concentration (%): 30  General: Middle aged female lying in bed comfortably. No acute distress. Cachectic.   HEENT: Clear conjunctivae. Dry mucus membrane. Intubated.   Chest: Good air entry. No wheezing, rales or rhonchi.   Heart: Normal S1 & S2. RRR.   Abdomen: Non distended. Soft. Non-tender. + BS. Rectal tube in place.   : Levy's catheter in place.   Ext: RLE: No pedal edema. Dressing in place. LLE: 1+ pedal edema. Dressing in place.    Neuro: Sleeping   Skin: Warm and Dry  Psychiatry: Unable to assess     I&O's Summary    03 Sep 2023 07:01  -  04 Sep 2023 07:00  --------------------------------------------------------  IN: 1390 mL / OUT: 830 mL / NET: 560 mL    04 Sep 2023 07:01  -  04 Sep 2023 16:53  --------------------------------------------------------  IN: 190 mL / OUT: 50 mL / NET: 140 mL    LABS:  CAPILLARY BLOOD GLUCOSE  POCT Blood Glucose.: 189 mg/dL (04 Sep 2023 16:03)  POCT Blood Glucose.: 181 mg/dL (04 Sep 2023 10:07)  POCT Blood Glucose.: 176 mg/dL (04 Sep 2023 06:16)  POCT Blood Glucose.: 184 mg/dL (04 Sep 2023 02:40)  POCT Blood Glucose.: 167 mg/dL (03 Sep 2023 21:50)                      9.2    1.51  )-----------( 39       ( 04 Sep 2023 05:40 )             28.3     09-04    147<H>  |  122<H>  |  53<H>  ----------------------------<  195<H>  4.9   |  22  |  0.73    Ca    6.6<L>      04 Sep 2023 05:40  Phos  3.0     09-04  Mg     1.9     09-04    TPro  4.4<L>  /  Alb  1.8<L>  /  TBili  0.5  /  DBili  x   /  AST  67<H>  /  ALT  109<H>  /  AlkPhos  96  09-04    PT/INR - ( 04 Sep 2023 05:40 )   PT: 12.0 sec;   INR: 1.03 ratio       PTT - ( 04 Sep 2023 05:40 )  PTT:31.3 sec    Blood Culture: 09-03 @ 10:00  Organism --  Gram Stain Blood -- Gram Stain --  Specimen Source .Blood Blood-Peripheral  Culture-Blood --    09-03 @ 09:55  Organism --  Gram Stain Blood -- Gram Stain --  Specimen Source .Blood Blood-Peripheral  Culture-Blood --    09-01 @ 09:19  Organism Stenotrophomonas maltophilia  Gram Stain Blood -- Gram Stain   Numerous polymorphonuclear leukocytes per low power field  No Squamous epithelial cells per low power field  Numerous Gram Negative Rods per oil power field  Numerous Gram positive cocci in pairs per oil power field  Few Gram Positive Rods per oil power field  Specimen Source ET Tube ET Tube  Culture-Blood --    08-31 @ 13:20  Organism Blood Culture PCR  Gram Stain Blood -- Gram Stain   Growth in anaerobic bottle: Gram Positive Cocci in Pairs and Chains  Growth in aerobic bottle: Gram Positive Cocci in Pairs and Chains  Specimen Source .Blood Blood-Peripheral  Culture-Blood --    08-31 @ 13:15  Organism --  Gram Stain Blood -- Gram Stain --  Specimen Source .Blood Blood-Peripheral  Culture-Blood --    RADIOLOGY & ADDITIONAL STUDIES:  Reviewed     MEDICATIONS  (STANDING):  chlorhexidine 0.12% Liquid 15 milliLiter(s) Oral Mucosa every 12 hours  chlorhexidine 2% Cloths 1 Application(s) Topical <User Schedule>  dextrose 50% Injectable 25 Gram(s) IV Push once  dextrose 50% Injectable 12.5 Gram(s) IV Push once  dextrose 50% Injectable 25 Gram(s) IV Push once  dextrose Oral Gel 15 Gram(s) Oral once  glucagon  Injectable 1 milliGRAM(s) IntraMuscular once  levoFLOXacin IVPB      multivitamin/minerals/iron Oral Solution (CENTRUM) 15 milliLiter(s) Oral daily  thiamine IVPB 200 milliGRAM(s) IV Intermittent daily

## 2023-09-05 NOTE — PROVIDER CONTACT NOTE (CRITICAL VALUE NOTIFICATION) - SITUATION
potassium 2.9, calcium 6.4
critical lab values called in from lab, potassium 9.4 and calcium 6.4
calcium 6.5
calcium level 5.7
calcium level  5.9
Low PO2

## 2023-09-05 NOTE — PROVIDER CONTACT NOTE (CRITICAL VALUE NOTIFICATION) - TEST AND RESULT REPORTED:
calcium
calcium 6.3
potassium 2.9, calcium 6.4
calcium 6.5
Calcium 6.2, Hgb 6.9, Hct 19.9, Plt 12
ABG
Glucose 1104, Calcium 5.7, Troponin 265.5
Glucose 770 and calcium 6.2
Hgb 6.9 / Hct 18.7
Calcium level
PH 7.02
Calcium 6.4
calcium 5.8
critical potassium 9.4 and Calcium 6.4

## 2023-09-05 NOTE — PROGRESS NOTE ADULT - SUBJECTIVE AND OBJECTIVE BOX
Patient is a 53y old  Female who presents with a chief complaint of fall, pancytopenia (27 Aug 2023 17:14)       HPI:  54yo F with PMH anemia, hypothyroidism, osteoporosis, chronic hyponatremia, depression, anxiety presents to ED on 8/27/23 due to weakness and hypoglycemia.  Patient was seen at this emergency room 8/24 ago for a fall at physical therapy 8/23.  Was diagnosed with left wrist fracture at that time.  Splint was applied and seen by orthopedics.  Recommended admission due to fall risk and unsafe discharge.  Patient refused admission and left AMA.  Patient was fully aware of  fall risk and all lab abnormalities.  States she wanted to follow-up outpatient.  Today patient became weak and aide assisted her down to the floor.  Denies hitting head or any injuries from the fall because aide assisted her.  EMS was called.  Sugar on arrival per EMS was 48 and given D10.  Sugar on arrival to emergency room was 171.  Patient denies any pain or complaints.  Denies any injuries from the fall.  At this time patient is refusing any treatment or test.  States she just wants to go home and eat.  States her sugar was low because she did not eat today but she says she will eat when she gets home. Patient removed splint that was placed 2 days ago and was not wearing splint today per EMS. Patient's sister reports that she has been getting worse for the last 6 months and has become increasingly more frail. Patient lives alone with aides as per sister. Patient has ritualistic eating habits and a diet which includes eggs, fish, and vegetables. She currently takes no medications.   Renal consulted for multiple electrolyte abnormalities.  She states she wants to urinate.  Denies any N/V/SOB.  Eating. States she had edema.     No acute events noted  Intubated       PAST MEDICAL & SURGICAL HISTORY:  Anemia      Depression      Chronic musculoskeletal pain      Anorexia  at age 15 yrs      No significant past surgical history           FAMILY HISTORY:  No pertinent family history in first degree relatives    [place]    Social History:Non smoker    MEDICATIONS  (STANDING):  chlorhexidine 0.12% Liquid 15 milliLiter(s) Oral Mucosa every 12 hours  chlorhexidine 2% Cloths 1 Application(s) Topical <User Schedule>  dextrose 50% Injectable 25 Gram(s) IV Push once  dextrose 50% Injectable 25 Gram(s) IV Push once  dextrose 50% Injectable 12.5 Gram(s) IV Push once  dextrose Oral Gel 15 Gram(s) Oral once  glucagon  Injectable 1 milliGRAM(s) IntraMuscular once  levoFLOXacin IVPB 750 milliGRAM(s) IV Intermittent every 48 hours  levoFLOXacin IVPB      multivitamin/minerals/iron Oral Solution (CENTRUM) 15 milliLiter(s) Oral daily  thiamine IVPB 200 milliGRAM(s) IV Intermittent daily    MEDICATIONS  (PRN):      Allergies    No Known Allergies    Intolerances         REVIEW OF SYSTEMS:  Intubated    ICU Vital Signs Last 24 Hrs  T(C): 35.6 (05 Sep 2023 07:58), Max: 36.5 (04 Sep 2023 20:00)  T(F): 96.1 (05 Sep 2023 07:58), Max: 97.7 (04 Sep 2023 20:00)  HR: 66 (05 Sep 2023 09:00) (66 - 106)  BP: --  BP(mean): --  ABP: 96/62 (05 Sep 2023 09:00) (83/53 - 127/76)  ABP(mean): 75 (05 Sep 2023 09:00) (65 - 96)  RR: 36 (05 Sep 2023 09:00) (15 - 37)  SpO2: 99% (05 Sep 2023 09:00) (94% - 100%)    O2 Parameters below as of 05 Sep 2023 08:00  Patient On (Oxygen Delivery Method): ventilator, CPAP5/5     O2 Concentration (%): 30        PHYSICAL EXAM:    GENERAL: cachectic, frail appearing; intubated  HEAD:  Atraumatic, Normocephalic  NERVOUS SYSTEM:  sedated  CHEST/LUNG: Clear to auscultation bilaterally  HEART: Regular rate and rhythm  EXTREMITIES:  No Edema  SKIN: + Ecchymosis LE      LABS:                                                                    8.8    2.16  )-----------( 30       ( 05 Sep 2023 05:40 )             28.0     09-05    148<H>  |  123<H>  |  60<H>  ----------------------------<  228<H>  5.3   |  21<L>  |  0.86    Ca    6.7<L>      05 Sep 2023 05:40  Phos  4.3     09-05  Mg     2.5     09-05    TPro  4.6<L>  /  Alb  1.6<L>  /  TBili  0.3  /  DBili  x   /  AST  45<H>  /  ALT  93<H>  /  AlkPhos  103  09-05    PT/INR - ( 04 Sep 2023 05:40 )   PT: 12.0 sec;   INR: 1.03 ratio         PTT - ( 04 Sep 2023 05:40 )  PTT:31.3 sec  Urinalysis Basic - ( 05 Sep 2023 05:40 )    Color: x / Appearance: x / SG: x / pH: x  Gluc: 228 mg/dL / Ketone: x  / Bili: x / Urobili: x   Blood: x / Protein: x / Nitrite: x   Leuk Esterase: x / RBC: x / WBC x   Sq Epi: x / Non Sq Epi: x / Bacteria: x

## 2023-09-05 NOTE — PROGRESS NOTE ADULT - ASSESSMENT
52yo F with PMH anemia, hypothyroidism, osteoporosis, chronic hyponatremia, depression, anxiety presented to ED on 8/27/23 due to weakness and hypoglycemia. Admitted to medicine for weakness and fall, pancytopenia.    1) Acute respiratory failure with hypoxia.   -Patient s/p RRT on 8/28 for unresponsiveness and hypoglycemic episode- intubated for airway protection and transferred to ICU  -Weaned off vent to NC- monitored resp status closely- went into respiratory distress again and was re-intubated  -getting CPAP trials   -No mental status off sedation   -plan per ICU.    2) Septic Shock  - Likely due to pneumonia  - ET Tube culture with Stenotrophomonas   - Blood culture x 1 with Strep  - Follow repeat cultures  - Cefepime was changed to Levaquin   - ID Consult appreciated   - Management per ICU    3) Pancytopenia   - History of leukopenia and anemia, now worsened likely due to FTT / Sepsis   - s/p 3 PRBCs and 1 Platelets   - Hem consult noted     4) Hypoglycemia.   -likely due to poor PO intake + sepsis   -Monitor closely     5) Electrolyte imbalance.   - Correction per ICU  - Renal recs noted     6) Weakness.   - Patient presented due to weakness, found to be hypoglycemic by EMS to 48, hyperglycemic on arrival. recent fall on 8/23 leading to broken wrist. concern for malnutrition vs gait disturbance vs deconditioning    7) Left Radius Fracture  - NWB LUE in splint  - Ortho consult noted     8) Hypothyroidism.   - TSH elevated  - Free T4 normal   - Management per ICU    9) Transaminitis.   - Likely due to shock liver   - Monitor   - Improving     10) History of behavioral and mental health problems.   - behavioral health consulted, recorded history of schizoid personality disorder, anorexia, and hospitalization over 20 years ago.    DVT Prophylaxis -- No need for chemical prophylaxis due to anemia / thrombocytopenia.    Management per ICU.     Prognosis poor.

## 2023-09-05 NOTE — PROGRESS NOTE ADULT - SUBJECTIVE AND OBJECTIVE BOX
[facility]  INFECTIOUS DISEASES   [address] [place] [address]  Tel: [phone]     Fax: [phone]  ========================================================  MD Roldan Delong Kaushal, MD Cho, Michelle, MD Sunjit, Jaspal, MD  ========================================================    [id number]  KYLE NAJERA     Follow up:  fall, pancytopenia and Acinetobacter Pneumonia?     No new changes, still intubated. No fever, Under warming blanket.     PAST MEDICAL & SURGICAL HISTORY:  Anemia  Depression  Chronic musculoskeletal pain  Anorexia  at age 15 yrs  No significant past surgical history    Social Hx: No smoking, EtOH or drugs     FAMILY HISTORY:  No pertinent family history in first degree relatives    Allergies  No Known Allergies    MEDICATIONS  (STANDING):  chlorhexidine 0.12% Liquid 15 milliLiter(s) Oral Mucosa every 12 hours  chlorhexidine 2% Cloths 1 Application(s) Topical <User Schedule>  dextrose 50% Injectable 25 Gram(s) IV Push once  dextrose 50% Injectable 25 Gram(s) IV Push once  dextrose 50% Injectable 12.5 Gram(s) IV Push once  dextrose Oral Gel 15 Gram(s) Oral once  glucagon  Injectable 1 milliGRAM(s) IntraMuscular once  levoFLOXacin IVPB      multivitamin/minerals/iron Oral Solution (CENTRUM) 15 milliLiter(s) Oral daily  thiamine IVPB 200 milliGRAM(s) IV Intermittent daily    REVIEW OF SYSTEMS:  Unable     Physical Exam:  Vital Signs Last 24 Hrs  T(C): 35.6 (05 Sep 2023 07:58), Max: 36.5 (04 Sep 2023 20:00)  T(F): 96.1 (05 Sep 2023 07:58), Max: 97.7 (04 Sep 2023 20:00)  HR: 66 (05 Sep 2023 09:00) (66 - 106)  RR: 36 (05 Sep 2023 09:00) (15 - 37)  SpO2: 99% (05 Sep 2023 09:00) (94% - 100%)  Parameters below as of 05 Sep 2023 08:00  Patient On (Oxygen Delivery Method): ventilator, CPAP5/5   O2 Concentration (%): 30  GEN: NAD, intubated, frail and cachectic   HEENT: normocephalic and atraumatic.   NECK: Supple.  No lymphadenopathy   LUNGS: Coarse breath sounds   HEART: Regular rate and rhythm   ABDOMEN: Soft, nondistended.  Positive bowel sounds.    EXTREMITIES: trace edema  NEUROLOGIC: unable  SKIN: No rash       Labs:                        8.8    2.16  )-----------( 30       ( 05 Sep 2023 05:40 )             28.0     09-05    148<H>  |  123<H>  |  60<H>  ----------------------------<  228<H>  5.3   |  21<L>  |  0.86    Ca    6.7<L>      05 Sep 2023 05:40  Phos  4.3     09-05  Mg     2.5     09-05    TPro  4.6<L>  /  Alb  1.6<L>  /  TBili  0.3  /  DBili  x   /  AST  45<H>  /  ALT  93<H>  /  AlkPhos  103  09-05    Culture - Blood (collected 09-03-23 @ 10:00)  Source: .Blood Blood-Peripheral    Culture - Blood (collected 09-03-23 @ 09:55)  Source: .Blood Blood-Peripheral    Culture - Sputum (collected 09-01-23 @ 09:19)  Source: ET Tube ET Tube  Gram Stain (09-01-23 @ 23:08):    Numerous polymorphonuclear leukocytes per low power field    No Squamous epithelial cells per low power field    Numerous Gram Negative Rods per oil power field    Numerous Gram positive cocci in pairs per oil power field    Few Gram Positive Rods per oil power field  Final Report (09-03-23 @ 18:55):    Mixed gram negative rods including    Moderate Stenotrophomonas maltophilia  Organism: Stenotrophomonas maltophilia (09-03-23 @ 18:55)  Organism: Stenotrophomonas maltophilia (09-03-23 @ 18:55)    Sensitivities:      Method Type: GAIL      -  Minocycline: S  Organism: Stenotrophomonas maltophilia (09-03-23 @ 18:55)    Sensitivities:      Method Type: CHRISTIANO      -  Levofloxacin: S <=0.5      -  Trimethoprim/Sulfamethoxazole: S <=0.5/9.5    Culture - Blood (collected 08-31-23 @ 13:20)  Source: .Blood Blood-Peripheral  Gram Stain (09-01-23 @ 12:53):    Growth in anaerobic bottle: Gram Positive Cocci in Pairs and Chains    Growth in aerobic bottle: Gram Positive Cocci in Pairs and Chains  Final Report (09-02-23 @ 15:57):    Growth in aerobic and anaerobic bottles: Streptococcus mitis/oralis group    Alpha hemolytic streptoccous (Not Streptococcus pneumoniae or    Enterococcus)    isolated from a single blood culture set may represent contamination.    Contact the MicrobiologyDepartment at [phone] if susceptibility    testing is clinically indicated.    Direct identification is available within approximately 3-5    hours either by Blood Panel Multiplexed PCR or Direct    MALDI-TOF. Details: [email or web address]  Organism: Blood Culture PCR (09-02-23 @ 15:57)  Organism: Blood Culture PCR (09-02-23 @ 15:57)    Sensitivities:      Method Type: PCR      -  Streptococcus sp. (Not Grp A, B or S pneumoniae): Detec    Culture - Blood (collected 08-31-23 @ 13:15)  Source: .Blood Blood-Peripheral    Culture - Urine (collected 08-27-23 @ 13:20)  Source: Clean Catch Clean Catch (Midstream)  Final Report (08-28-23 @ 18:35):    <10,000 CFU/mL Normal Urogenital Micki    WBC Count: 2.16 K/uL (09-05-23 @ 05:40)  WBC Count: 1.47 K/uL (09-04-23 @ 16:29)  WBC Count: 1.51 K/uL (09-04-23 @ 05:40)  WBC Count: 1.16 K/uL (09-03-23 @ 09:55)  WBC Count: 2.45 K/uL (09-02-23 @ 18:25)  WBC Count: 1.25 K/uL (09-02-23 @ 09:45)  WBC Count: 0.69 K/uL (09-02-23 @ 06:00)  WBC Count: 1.48 K/uL (09-01-23 @ 07:07)  WBC Count: 2.27 K/uL (08-31-23 @ 19:25)  WBC Count: 1.63 K/uL (08-31-23 @ 13:05)    Creatinine: 0.86 mg/dL (09-05-23 @ 05:40)  Creatinine: 0.78 mg/dL (09-04-23 @ 16:29)  Creatinine: 0.73 mg/dL (09-04-23 @ 05:40)  Creatinine: 0.69 mg/dL (09-03-23 @ 19:00)  Creatinine: 0.72 mg/dL (09-03-23 @ 09:55)  Creatinine: 0.70 mg/dL (09-02-23 @ 18:25)  Creatinine: 0.66 mg/dL (09-02-23 @ 09:45)  Creatinine: 0.69 mg/dL (09-02-23 @ 06:00)  Creatinine: 0.72 mg/dL (09-01-23 @ 22:00)  Creatinine: 0.71 mg/dL (09-01-23 @ 14:50)  Creatinine: 0.65 mg/dL (09-01-23 @ 07:07)  Creatinine: 0.47 mg/dL (08-31-23 @ 19:25)  Creatinine: 0.44 mg/dL (08-31-23 @ 13:05)    Ferritin: 430 ng/mL (08-27-23 @ 12:20)     COVID-19 PCR: NotDetec (08-31-23 @ 11:40)    All imaging and other data have been reviewed.  < from: CT Chest w/ IV Cont (08.31.23 @ 15:10) >  IMPRESSION:  Small right pleural effusion and associated compressive atelectasis.  Small to moderate left-sided pleural effusion with extensive partial   atelectasis left lower lobe.  Patchy airspace consolidation left upper lobe with peribronchial   opacities right upper lobe, likely related to infection/pneumonia.  CT findings as discussed which are suggestive of hypoperfusion complex   (shock bowel).  No CT evidence for acute gastrointestinal bleeding.  Other findings as discussed above.    Assessment and Plan:   52yo woman with PMH of anorexia nervosa for more than 30yers, FTT with low BMP, hypothyroidism, osteoporosis, chronic hyponatremia was admitted on 8/27 after a fall, found to be pancytopenic.   She also had hypoglycemia. Patient's sister is in the bedside and reports that she has been getting worse for the last 6 months and has become increasingly more frail. Patient lives alone with aides.  On admission no fever, chills, chest pain, palpitations, SOB, cough, abdominal pain, nausea, vomiting, diarrhea, constipation, urinary frequency, urgency, or dysuria, headaches.  On 8/28 became hypoglycemic and unresponsive, so was intubated and was transferred to MICU. On 8/30 extubated but reintubated on 8/31 again.   Since then has worsening of pancytopenia and also, has developed pneumonia. ET tube culture showed Stenotrophomonas.     Even though her pancytopenia could be related to poor nutrition but since she used to go out for daily walkings as per sister, will rule out tick borne diseases, no rash or bug bites as far as sister knows.   She also had strep bacteremia that could be real infection, so will rule out endocarditis causing pancytopenia. No fever but she doesn;t have a normal immune system to have normal immune response.   ET culture with Stenotrophomonas could be colonization in tube or real, since she is very sick will start her on Levaquin. Bactrim is the drug of choice but will cause more bone marrow suppression.   Even though cQT is prolonged but since she is intubated on monitor, levaquin would be a better choice, for any reason unable to continue levaquin, can start Minocycline.     # Malnutrition   # Pancytopenia  # Respiratory failue  # Strep Bacteremia   # Stenotrophomonas pneumonia     - Repeat blood cultures x 2  NGTD   - Will follow tick PCR and serology  - Monitor CBC and ECG for cQT  - Continue Levaquin 750mg daily (covers acinetobacter pneumonia and strep bacteremia)   - Can treat about 7days   - TTE pending     Will follow.    Adelia Peralta MD  Division of Infectious Diseases   Please call ID service at [phone] with any question.    35 minutes spent on total encounter assessing patient, examination, chart review, counseling and coordinating care by the attending physician/nurse/care manager.

## 2023-09-05 NOTE — PROVIDER CONTACT NOTE (CRITICAL VALUE NOTIFICATION) - PERSON GIVING RESULT:
Corazon
jose francisco
Lab
Lindsay Crockett/lab
Lab
Lab, Louise
Lab, Louise
RT Mendes
Chano
resp
Max- lab
Olivier/lab
Susan Cronin
Tara Jose-[place]

## 2023-09-05 NOTE — PROGRESS NOTE ADULT - ASSESSMENT
MYRNA  Hypernatremia s/p hyponatremia  Hypokalemia/hyperkalemia   Hypothyroidism  FTT  Anemia   Acute respiratory failure   Bradycardia  Hyperphosphatemia     -MYRNA likely from hypoglycemic event, improving  -Suspect low solute in take, tea and toast physiology  -Urine indices reviewed  -PRBCs per primary  -TSH elevated, check T4, could have also caused hyponatremia  -Deemed not to have capacity by psych; now intubated however  -Sodium labile; trend improving again; agree with increasing FW to 300cc q6hrs  -Phos stable; no evidence of refeeding syndrome at present  -Hyperchloremic acidosis, mild - monitor for now  -Mir erinn 8; s/p replacement per ICU  -Vent management per ICU  -Consider Lokelma 5gm x 1

## 2023-09-05 NOTE — PROGRESS NOTE ADULT - SUBJECTIVE AND OBJECTIVE BOX
Interval Events:  Remains intubated  Non-responsive off sedation    REVIEW OF SYSTEMS:  [ ] All other systems negative  [x] Unable to assess ROS due to poor mentation    OBJECTIVE:    ICU Vital Signs Last 24 Hrs  T(C): 35.6 (05 Sep 2023 07:58), Max: 36.5 (04 Sep 2023 20:00)  T(F): 96.1 (05 Sep 2023 07:58), Max: 97.7 (04 Sep 2023 20:00)  HR: 66 (05 Sep 2023 09:00) (66 - 106)  BP: --  BP(mean): --  ABP: 96/62 (05 Sep 2023 09:00) (83/53 - 127/76)  ABP(mean): 75 (05 Sep 2023 09:00) (65 - 96)  RR: 36 (05 Sep 2023 09:00) (15 - 37)  SpO2: 99% (05 Sep 2023 09:00) (94% - 100%)    O2 Parameters below as of 05 Sep 2023 08:00  Patient On (Oxygen Delivery Method): ventilator, CPAP5/5     O2 Concentration (%): 30    I&O's Detail    04 Sep 2023 07:01  -  05 Sep 2023 07:00  --------------------------------------------------------  IN:    Enteral Tube Flush: 300 mL q 6 hours    IV PiggyBack: 100 mL    IV PiggyBack: 50 mL    IV PiggyBack: 250 mL    Lactated Ringers Bolus: 500 mL    Nepro with Carb Steady: 570 mL  Total IN: 1770 mL    OUT:    Indwelling Catheter - Urethral (mL): 630 mL    Rectal Tube (mL): 100 mL  Total OUT: 730 mL    Total NET: 1040 mL                            8.8    2.16  )-----------( 30       ( 05 Sep 2023 05:40 )             28.0       09-05    148<H>  |  123<H>  |  60<H>  ----------------------------<  228<H>  5.3   |  21<L>  |  0.86    Ca    6.7<L>      05 Sep 2023 05:40  Phos  4.3     09-05  Mg     2.5     09-05    TPro  4.6<L>  /  Alb  1.6<L>  /  TBili  0.3  /  DBili  x   /  AST  45<H>  /  ALT  93<H>  /  AlkPhos  103  09-05              PT/INR - ( 04 Sep 2023 05:40 )   PT: 12.0 sec;   INR: 1.03 ratio         PTT - ( 04 Sep 2023 05:40 )  PTT:31.3 sec          PHYSICAL EXAM:  General: Intubated, sedated, cachectic  HEENT: NC/AT   Neck: Supple  I&O's Detail  Respiratory: CTAB. No wheezing.  Cardiovascular: RRR. No murmur. No edema  Abdomen: Soft, nondistended  Extremities: Warm  Neurological: A&Ox0              HOSPITAL MEDICATIONS:    levoFLOXacin IVPB          dextrose 50% Injectable 25 Gram(s) IV Push once  dextrose 50% Injectable 25 Gram(s) IV Push once  dextrose 50% Injectable 12.5 Gram(s) IV Push once  dextrose Oral Gel 15 Gram(s) Oral once  glucagon  Injectable 1 milliGRAM(s) IntraMuscular once      multivitamin/minerals/iron Oral Solution (CENTRUM) 15 milliLiter(s) Oral daily  thiamine IVPB 200 milliGRAM(s) IV Intermittent daily      chlorhexidine 0.12% Liquid 15 milliLiter(s) Oral Mucosa every 12 hours  chlorhexidine 2% Cloths 1 Application(s) Topical <User Schedule>        53 year old female with with anorexia, severe malnutrition, hypothyroidism, osteoporosis with multiple fractures, and chronic hyponatremia, who presents with failure to thrive. Course complicated by hypoglycemia, bradycardia, encephalopathy, and hypoxia requiring multiple intubations.     Acute metabolic encephalopathy  Acute hypoxemic respiratory failure  Septic shock  Stenotrophomonas pneumonia  Anorexia   Severe protein-calorie malnutrition  Pancytopenia  Electrolyte derangements  Acute kidney injury  Distal radius fx    NEURO: Acute metabolic encephalopathy. CTH 9/4 negative for bleed. Continue to hold sedation.  PULM: Reintubated 8/31 for hypoxic respiratory failure. Continue vent support given poor mentation. Tolerating PST today.  CVS: Septic shock requiring vasopressors. Now off. BP improved.  GI: Continue trickle feeds via NGT  RENAL: Electrolyte derangements improving, continue to monitor and supplement as needed. Free water flushes for hypernatremia (300ml q 6 hours).  Nutrition goals to increase 10cc/hr until 40cc/hr per 20h.   HEME: Pancytopenia, suspect bone marrow suppression from severe malnutrition.  ENDO: Monitor fingersticks  ID: Sputum Cx with stenotrophomonas. One set of BCx with strep mitis. Follow up repeat BCx. Continue levofloxacin. TTE read pending. Tick panel pending.  MSK: Distal radius fx s/p splint  CODE: Full code; to discuss goals of care with family considering no neuro improvement despite no sedation.    SKIN: Multiple pressure ulcers and skin tears  LINES: ETT, NGT; L axillary a line; IJ.     Prognosis guarded    Family updated at bedside

## 2023-09-05 NOTE — PROGRESS NOTE ADULT - ASSESSMENT
Impression:  1. acute hypercarbic/hypoxemic respiratory failure  2. septic shock  3. stenotrophomonas PNA  4. severe protein malnutrition  5. hypernatremia  6. distal radius fracture sp fall  7. anorexia  8. acute metabolic encephalopathy  9. hyperkalemia  10. pancytopenia  11. strept bacteremia    Plan:  Neuro - CTH head negative for acute pathology, ammonia mild elevation only, remains encephalopathic               check TSH and free t4 in am    CV -  actively titrating pressors to keep MAP>65           Echo nml LV function           check cortisol level in am    Pulm -  full vent support with LTV 6-8cc/kg IDW, keeping plts<30             failed PS trials today with development of acute hypercarbia             ABG now with full vent support showing correction             actively titrating FiO2 to keep sats>90%             Scx prior with Stenotrophomonas treating with IV Levofloxacin             no further weaning overnight             ongoing GOC discussions with family, trach pending GOC with family    GI -  Enteric feeds as tolerated at goal          thiamine, MVIr          f/u further RD recs    Renal - Cr stable, strict I/Os, avoid MAP<65             Na normalized, ongoing free water replacement with bolus free water 300ml [id number]             check TSH and Free T4, prior TSH elevated    Heme -  pancytopenia likely bone marrow suppression/poor malnutrition, no signs of active bleeding               tx Hbg<7 or signs of active bleeding               tx plts<10k or <50k with active bleeding               no chem DVT proph with plts<50k, currently lateral at 30k    ID - afebrile, on low dose pressors, BCx prior with strept bacteremia, Scx stenotrophomonas, treating with levaquin, sensitivities reviewed, repeat BCX NGTD, Echo no significant valve pathology or veg,        trend WBC, f/u ID     Endo -  no further hypoglycemic events, check TSH/free T4                 Impression:  1. acute hypercarbic/hypoxemic respiratory failure  2. septic shock  3. stenotrophomonas PNA  4. severe protein malnutrition  5. hypernatremia  6. anorexia  7. acute metabolic encephalopathy  8. hyperkalemia  9. pancytopenia  10. strept bacteremia    Plan:  Neuro - CTH head negative for acute pathology, ammonia mild elevation only, remains encephalopathic               check TSH and free t4 in am    CV -  actively titrating pressors to keep MAP>65           Echo nml LV function           check cortisol level in am    Pulm -  full vent support with LTV 6-8cc/kg IDW, keeping plts<30             failed PS trials today with development of acute hypercarbia             ABG now with full vent support showing correction             actively titrating FiO2 to keep sats>90%             Scx prior with Stenotrophomonas treating with IV Levofloxacin             no further weaning overnight             ongoing GOC discussions with family, trach pending GOC with family    GI -  Enteric feeds as tolerated at goal          thiamine, MVIr          f/u further RD recs    Renal - Cr stable, strict I/Os, avoid MAP<65             Na normalized, ongoing free water replacement with bolus free water 300ml [id number]             check TSH and Free T4, prior TSH elevated    Heme -  pancytopenia likely bone marrow suppression/poor malnutrition, no signs of active bleeding               tx Hbg<7 or signs of active bleeding               tx plts<10k or <50k with active bleeding               no chem DVT proph with plts<50k, currently lateral at 30k    ID - afebrile, on low dose pressors, BCx prior with strept bacteremia, Scx stenotrophomonas, treating with levaquin, sensitivities reviewed, repeat BCX NGTD, Echo no significant valve pathology or veg,        trend WBC, f/u ID     Endo -  no further hypoglycemic events, check TSH/free T4

## 2023-09-05 NOTE — PROGRESS NOTE ADULT - SUBJECTIVE AND OBJECTIVE BOX
Patient is a 53y old  Female who presents with a chief complaint of fall, pancytopenia (05 Sep 2023 13:58)      BRIEF HOSPITAL COURSE:   53F with PMHx anorexia, severe malnutrition, hypothryoidism, osteoporosis with multiple fractures, hyponatremia who presented with weakness and hypoglycemia. Course complicated by hypoglycemia, bradycardia, encephalopathy, and [facility]     Events last 24 hours: afebrile, on pressors, failed CPAP trial with development of severe hypercarbia, tube feeds running, no improvement in MS.    PAST MEDICAL & SURGICAL HISTORY:  Anemia      Depression      Chronic musculoskeletal pain      Anorexia  at age 15 yrs      No significant past surgical history          Review of Systems:  unable to perform at this time, pt intubated    Medications:  levoFLOXacin IVPB 750 milliGRAM(s) IV Intermittent every 48 hours  levoFLOXacin IVPB        norepinephrine Infusion 0.05 MICROgram(s)/kG/Min IV Continuous <Continuous>                dextrose 50% Injectable 25 Gram(s) IV Push once  dextrose 50% Injectable 25 Gram(s) IV Push once  dextrose 50% Injectable 12.5 Gram(s) IV Push once  dextrose Oral Gel 15 Gram(s) Oral once  glucagon  Injectable 1 milliGRAM(s) IntraMuscular once    multivitamin/minerals/iron Oral Solution (CENTRUM) 15 milliLiter(s) Oral daily  thiamine IVPB 200 milliGRAM(s) IV Intermittent daily      chlorhexidine 0.12% Liquid 15 milliLiter(s) Oral Mucosa every 12 hours  chlorhexidine 2% Cloths 1 Application(s) Topical <User Schedule>        Mode: AC/ CMV (Assist Control/ Continuous Mandatory Ventilation)  RR (machine): 22  TV (machine): 300  FiO2: 30  PEEP: 5  PS:   ITime: 1  MAP: 8  PIP: 15      ICU Vital Signs Last 24 Hrs  T(C): 35.1 (05 Sep 2023 20:10), Max: 36.4 (05 Sep 2023 00:00)  T(F): 95.2 (05 Sep 2023 20:10), Max: 97.5 (05 Sep 2023 00:00)  HR: 82 (05 Sep 2023 21:07) (66 - 116)  BP: --  BP(mean): --  ABP: 107/72 (05 Sep 2023 21:07) (60/37 - 156/96)  ABP(mean): 86 (05 Sep 2023 21:07) (48 - 121)  RR: 31 (05 Sep 2023 21:07) (12 - 40)  SpO2: 100% (05 Sep 2023 21:07) (90% - 100%)    O2 Parameters below as of 05 Sep 2023 16:00  Patient On (Oxygen Delivery Method): ventilator, AC22,300,5    O2 Concentration (%): 30        ABG - ( 05 Sep 2023 21:00 )  pH, Arterial: 7.35  pH, Blood: x     /  pCO2: 33    /  pO2: 97    / HCO3: 18    / Base Excess: -7.6  /  SaO2: 97.0        I&O's Summary    04 Sep 2023 07:01  -  05 Sep 2023 07:00  --------------------------------------------------------  IN: 1770 mL / OUT: 730 mL / NET: 1040 mL    05 Sep 2023 07:01  -  05 Sep 2023 21:15  --------------------------------------------------------  IN: 1535.4 mL / OUT: 120 mL / NET: 1415.4 mL        LABS:                        8.8    2.16  )-----------( 30       ( 05 Sep 2023 05:40 )             28.0     09-05    145  |  120<H>  |  59<H>  ----------------------------<  213<H>  5.5<H>   |  24  |  0.93    Ca    6.5<LL>      05 Sep 2023 14:35  Phos  4.0     09-05  Mg     2.3     09-05    TPro  4.4<L>  /  Alb  1.6<L>  /  TBili  0.3  /  DBili  x   /  AST  40<H>  /  ALT  82<H>  /  AlkPhos  91  09-05          CAPILLARY BLOOD GLUCOSE  238 (05 Sep 2023 05:44)      POCT Blood Glucose.: 204 mg/dL (05 Sep 2023 17:17)    PT/INR - ( 04 Sep 2023 05:40 )   PT: 12.0 sec;   INR: 1.03 ratio         PTT - ( 04 Sep 2023 05:40 )  PTT:31.3 sec  Urinalysis Basic - ( 05 Sep 2023 14:35 )    Color: x / Appearance: x / SG: x / pH: x  Gluc: 213 mg/dL / Ketone: x  / Bili: x / Urobili: x   Blood: x / Protein: x / Nitrite: x   Leuk Esterase: x / RBC: x / WBC x   Sq Epi: x / Non Sq Epi: x / Bacteria: x      CULTURES:  Culture Results:   No growth at 48 Hours (09-03 @ 10:00)  Culture Results:   No growth at 48 Hours (09-03 @ 09:55)  Culture Results:   Mixed gram negative rods including  Moderate Stenotrophomonas maltophilia (09-01 @ 09:19)  Culture Results:   Growth in aerobic and anaerobic bottles: Streptococcus mitis/oralis group  Alpha hemolytic streptoccous (Not Streptococcus pneumoniae or  Enterococcus)  isolated from a single blood culture set may represent contamination.  Contact the MicrobiologyDepartment at [phone] if susceptibility  testing is clinically indicated.  Direct identification is available within approximately 3-5  hours either by Blood Panel Multiplexed PCR or Direct  MALDI-TOF. Details: [email or web address] (08-31 @ 13:20)  Culture Results:   No growth at 5 days (08-31 @ 13:15)      Physical Examination:    General: on vent, NAD, minimal response, cachetic    HEENT: Pupils equal, reactive to light.  Symmetric.    PULM: diminished BS at bases bilaterally, no wheezing    CVS: Regular rate and rhythm    ABD: Soft, nondistended, normoactive bowel sounds    EXT: No edema    SKIN: Warm and well perfused, no rashes noted.    RADIOLOGY:     ACC: [id number] EXAM:  CT BRAIN   ORDERED BY: RAFAEL LUGO     PROCEDURE DATE:  09/04/2023          INTERPRETATION:  CLINICAL INDICATIONS:  Alteration of  Consciousness    COMPARISON: Head CT dated 8/24/2023    TECHNIQUE: Noncontrast CT of the head. Multiplanar reformations are   submitted.    FINDINGS:  Motion artifact limits interpretation.  There is periventricular and subcortical white matter hypodensity without   mass effect, nonspecific, likely representing mild chronic microvascular   ischemic changes. There is no compelling evidence for an acute   transcortical infarction. There is no evidence of mass, mass effect,   midline shift or extra-axial fluid collection. The lateral ventricles and   cortical sulci are age-appropriate in size and configuration. Right-sided   NG tube. Endotracheal tube is partially visualized. Mild inflammatory   mucosal changes are seen throughout the various portions of the paranasal   sinuses. Mild fat stranding bilateral retrobulbar spaces, unchanged. The   orbits and mastoid air cells are otherwise unremarkable. The calvarium is   intact. Consider MRI of the brain/orbits as clinically warranted.    IMPRESSION:  Mild chronic microvascular changes without evidence of an   acute transcortical infarction or hemorrhage.    --- End of Report ---            ANA MARIE MD; Attending Radiologist  This document has been electronically signed. Sep  4 2023  1:10PM  TRANSTHORACIC ECHOCARDIOGRAM REPORT  ________________________________________________________________________________                                      _______       Pt. Name:       KYLE NAJERA Study Date:    9/3/2023  MRN:            [id number]        YOB: 1970  Accession #:    [id number]        Age:           53 years  Account#:       [id number]       Gender:        F  Heart Rate:                      Height:        57.00 in (144.78 cm)  Rhythm:                          Weight:        59.00 lb (26.76 kg)  Blood Pressure: 106/71 mmHg      BSA/BMI:       1.07 m² / 12.77 kg/m²  ________________________________________________________________________________________  Referring Physician:    [id number] Madonna Jacob  Interpreting Physician: Latonia Acosta  Primary Sonographer:    Hayde Calix [facility]    CPT:               ECHO TTE WO CON COMP W DOPP - 66900.m  Indication(s):     Heart failure, unspecified - I50.9  Procedure:         Transthoracic echocardiogram with 2-D, M-mode and complete                     spectral and color flow Doppler.  Ordering Location: ICU1  Study Information: Image quality for this study is technically difficult.    _______________________________________________________________________________________     CONCLUSIONS:      1. Technically difficult image quality.   2. Left ventricular systolic function is normal with an ejection fraction visually estimated at 55 %.   3. Normal right ventricular cavity size, normal right ventricular wall thickness and normal right ventricular systolic function.   4. The aortic valve is tricuspid with normal structure without stenosis with normal systolic excursion.   5. Trace aortic regurgitation.   6. Mild mitral regurgitation.   7. Trace tricuspid regurgitation.    ________________________________________________________________________________________  FINDINGS:     Left Ventricle:  Left ventricular systolic function is normal with an ejection fraction visually estimated at 55%.     Right Ventricle:  Normal right ventricular cavity size, normal wall thickness and normal right ventricular systolic function.     Left Atrium:  The left atrium is normal in size.     Right Atrium:  The right atrium is normal in size.     Aortic Valve:  The aortic valve is tricuspid with normal structure without stenosis with normal systolic excursion. There is trace aortic regurgitation.     Mitral Valve:  Structurally normal mitral valve with normal leaflet excursion. There is mild mitral regurgitation.     Tricuspid Valve:  Structurally normal tricuspid valve with normal leaflet excursion. There is trace tricuspid regurgitation. Estimated pulmonary artery systolic pressure is 20 mmHg.     Pulmonic Valve:  The pulmonic valve was not well visualized.     Pericardium:  There is a trace pericardial effusion.     Pleura:  Large bilateral pleural effusion noted.  ____________________________________________________________________  Quantitative Data:  Left Ventricle Measurements: (Indexed to BSA)     IVSd (2D):   0.7 cm  LVPWd (2D):  0.8 cm  LVIDd (2D):  4.1 cm  LVIDs (2D):  3.1 cm  LV Mass:     95 g   88.7 g/m²  Visualized LV EF%: 55%     MV E Vmax:    0.26 m/s  MV A Vmax:    0.65 m/s  MV E/A:       0.40  e' lateral:   8.05 cm/s  e' medial:    5.00 cm/s  E/e' lateral: 3.19  E/e' medial:  5.14  E/e' Average: 3.94  MV DT:        232 msec    Aorta Measurements: (normal range) (Indexed to BSA)     Sinuses of Valsalva: 2.90 cm (2.7 - 3.3 cm)cm       Left Atrium Measurements: (Indexed to BSA)  LA Diam 2D: 2.00 cm       LVOT / RVOT/ Qp/Qs Data: (Indexed to BSA)  Mitral Valve Measurements:     MV E Vmax: 0.3 m/s         MR Vmax:          4.47 m/s  MV A Vmax: 0.6 m/s         MR Peak Gradient: 79.9 mmHg  MV E/A:    0.4       Tricuspid Valve Measurements:     TR Vmax:          2.1 m/s  TR Peak Gradient: 17.5 mmHg  RA Pressure:      3 mmHg  PASP:             20 mmHg    ________________________________________________________________________________________  Electronically signed on 9/5/2023 at 12:47:40 PM by Latonia Acosta      CRITICAL CARE TIME SPENT: 35 mins assessing presenting problems of acute illness that poses high probability of life threatening deterioration or end organ damage/dysfunction.  Medical decision making including Initiating plan of care, reviewing data, reviewing radiology, direct patient bedside evaluation and interpretation of vital signs, any necessary ventilator management , discussion with multidisciplinary team,  all non inclusive of procedures

## 2023-09-05 NOTE — PROGRESS NOTE ADULT - SUBJECTIVE AND OBJECTIVE BOX
Patient is a 53y old  Female who presents with a chief complaint of fall, pancytopenia (05 Sep 2023 11:17)      INTERVAL HPI/OVERNIGHT EVENTS: Patient seen and examined at bedside. Unable to obtain ROS due to mental status - intubated. Non responsive off sedation     MEDICATIONS  (STANDING):  chlorhexidine 0.12% Liquid 15 milliLiter(s) Oral Mucosa every 12 hours  chlorhexidine 2% Cloths 1 Application(s) Topical <User Schedule>  dextrose 50% Injectable 25 Gram(s) IV Push once  dextrose 50% Injectable 12.5 Gram(s) IV Push once  dextrose 50% Injectable 25 Gram(s) IV Push once  dextrose Oral Gel 15 Gram(s) Oral once  glucagon  Injectable 1 milliGRAM(s) IntraMuscular once  levoFLOXacin IVPB 750 milliGRAM(s) IV Intermittent every 48 hours  levoFLOXacin IVPB      multivitamin/minerals/iron Oral Solution (CENTRUM) 15 milliLiter(s) Oral daily  norepinephrine Infusion 0.05 MICROgram(s)/kG/Min (2.5 mL/Hr) IV Continuous <Continuous>  thiamine IVPB 200 milliGRAM(s) IV Intermittent daily    MEDICATIONS  (PRN):      Allergies    No Known Allergies    Intolerances        Vital Signs Last 24 Hrs  T(C): 35.6 (05 Sep 2023 11:34), Max: 36.5 (04 Sep 2023 20:00)  T(F): 96.1 (05 Sep 2023 11:34), Max: 97.7 (04 Sep 2023 20:00)  HR: 79 (05 Sep 2023 13:49) (66 - 106)  BP: --  BP(mean): --  RR: 18 (05 Sep 2023 13:49) (15 - 40)  SpO2: 96% (05 Sep 2023 13:49) (94% - 99%)    Parameters below as of 05 Sep 2023 08:00  Patient On (Oxygen Delivery Method): ventilator, CPAP5/5     O2 Concentration (%): 30  I&O's Summary    04 Sep 2023 07:01  -  05 Sep 2023 07:00  --------------------------------------------------------  IN: 1770 mL / OUT: 730 mL / NET: 1040 mL    05 Sep 2023 07:01  -  05 Sep 2023 13:58  --------------------------------------------------------  IN: 1212 mL / OUT: 0 mL / NET: 1212 mL          PHYSICAL EXAM: limited as patient does not participate  GENERAL: intubated, frail, thin, appears older than stated age  HEENT:  AT/NC, dry mucous membranes, EOMI, conjunctiva and sclera clear  CHEST/LUNG: diminished breath sounds at bases overall CTA b/l, no rales, wheezes, or rhonchi, no intercostal retractions; intubated  HEART:  RRR, S1, S2, no murmurs; no pitting edema  ABDOMEN:  BS+, soft, nontender, nondistended  MSK/EXTREMITIES: palpable peripheral pulses, no clubbing or cyanosis; muscle wasting noted in extremities   NERVOUS SYSTEM: intubated, off sedation, unresponsive, does not follow commands  PSYCH: flat affect; poor judgement  SKIN: multiple pressure ulcers and skin tears      LABS: Personally reviewed  CBC                        8.8    2.16  )-----------( 30       ( 05 Sep 2023 05:40 )             28.0     CMP  09-05    148  |  123  |  60  ----------------------------<  228  5.3   |  21  |  0.86    Ca    6.7      05 Sep 2023 05:40  Phos  4.3     09-05  Mg     2.5     09-05    TPro  4.6  /  Alb  1.6  /  TBili  0.3  /  DBili  x   /  AST  45  /  ALT  93  /  AlkPhos  103  09-05          PT/INR - ( 04 Sep 2023 05:40 )   PT: 12.0 sec;   INR: 1.03 ratio         PTT - ( 04 Sep 2023 05:40 )  PTT:31.3 sec          09-02 Chol -- LDL -- HDL -- Trig 21 mg/dL          238 (05 Sep 2023 05:44)  253 (05 Sep 2023 02:11)  191 (04 Sep 2023 22:20)      POCT Blood Glucose.: 185 mg/dL (05 Sep 2023 10:20)  POCT Blood Glucose.: 238 mg/dL (05 Sep 2023 05:44)  POCT Blood Glucose.: 253 mg/dL (05 Sep 2023 02:11)  POCT Blood Glucose.: 191 mg/dL (04 Sep 2023 22:20)  POCT Blood Glucose.: 189 mg/dL (04 Sep 2023 16:03)      Urinalysis Basic - ( 05 Sep 2023 05:40 )    Color: x / Appearance: x / SG: x / pH: x  Gluc: 228 mg/dL / Ketone: x  / Bili: x / Urobili: x   Blood: x / Protein: x / Nitrite: x   Leuk Esterase: x / RBC: x / WBC x   Sq Epi: x / Non Sq Epi: x / Bacteria: x        Culture - Blood (collected 03 Sep 2023 10:00)  Source: .Blood Blood-Peripheral  Preliminary Report (04 Sep 2023 15:01):    No growth at 24 hours    Culture - Blood (collected 03 Sep 2023 09:55)  Source: .Blood Blood-Peripheral  Preliminary Report (04 Sep 2023 15:01):    No growth at 24 hours    Culture - Sputum (collected 01 Sep 2023 09:19)  Source: ET Tube ET Tube  Gram Stain (01 Sep 2023 23:08):    Numerous polymorphonuclear leukocytes per low power field    No Squamous epithelial cells per low power field    Numerous Gram Negative Rods per oil power field    Numerous Gram positive cocci in pairs per oil power field    Few Gram Positive Rods per oil power field  Final Report (03 Sep 2023 18:55):    Mixed gram negative rods including    Moderate Stenotrophomonas maltophilia  Organism: Stenotrophomonas maltophilia (03 Sep 2023 18:55)  Organism: Stenotrophomonas maltophilia (03 Sep 2023 18:55)      Method Type: KB      -  Minocycline: S  Organism: Stenotrophomonas maltophilia (03 Sep 2023 18:55)      Method Type: CHRISTIANO      -  Levofloxacin: S <=0.5      -  Trimethoprim/Sulfamethoxazole: S <=0.5/9.5    Culture - Blood (collected 31 Aug 2023 13:20)  Source: .Blood Blood-Peripheral  Gram Stain (01 Sep 2023 12:53):    Growth in anaerobic bottle: Gram Positive Cocci in Pairs and Chains    Growth in aerobic bottle: Gram Positive Cocci in Pairs and Chains  Final Report (02 Sep 2023 15:57):    Growth in aerobic and anaerobic bottles: Streptococcus mitis/oralis group    Alpha hemolytic streptoccous (Not Streptococcus pneumoniae or    Enterococcus)    isolated from a single blood culture set may represent contamination.    Contact the MicrobiologyDepartment at [phone] if susceptibility    testing is clinically indicated.    Direct identification is available within approximately 3-5    hours either by Blood Panel Multiplexed PCR or Direct    MALDI-TOF. Details: [email or web address]  Organism: Blood Culture PCR (02 Sep 2023 15:57)  Organism: Blood Culture PCR (02 Sep 2023 15:57)      Method Type: PCR      -  Streptococcus sp. (Not Grp A, B or S pneumoniae): Detec    Culture - Blood (collected 31 Aug 2023 13:15)  Source: .Blood Blood-Peripheral  Preliminary Report (04 Sep 2023 19:01):    No growth at 4 days      COVID-19 PCR: NotDetec (08-31-23 @ 11:40)        Culture - Blood (collected 09-03-23 @ 10:00)  Source: .Blood Blood-Peripheral  Preliminary Report (09-04-23 @ 15:01):    No growth at 24 hours    Culture - Blood (collected 09-03-23 @ 09:55)  Source: .Blood Blood-Peripheral  Preliminary Report (09-04-23 @ 15:01):    No growth at 24 hours    Culture - Sputum (collected 09-01-23 @ 09:19)  Source: ET Tube ET Tube  Gram Stain (09-01-23 @ 23:08):    Numerous polymorphonuclear leukocytes per low power field    No Squamous epithelial cells per low power field    Numerous Gram Negative Rods per oil power field    Numerous Gram positive cocci in pairs per oil power field    Few Gram Positive Rods per oil power field  Final Report (09-03-23 @ 18:55):    Mixed gram negative rods including    Moderate Stenotrophomonas maltophilia  Organism: Stenotrophomonas maltophilia (09-03-23 @ 18:55)  Organism: Stenotrophomonas maltophilia (09-03-23 @ 18:55)      Method Type: KB      -  Minocycline: S  Organism: Stenotrophomonas maltophilia (09-03-23 @ 18:55)      Method Type: CHRISTIANO      -  Levofloxacin: S <=0.5      -  Trimethoprim/Sulfamethoxazole: S <=0.5/9.5    Culture - Blood (collected 08-31-23 @ 13:20)  Source: .Blood Blood-Peripheral  Gram Stain (09-01-23 @ 12:53):    Growth in anaerobic bottle: Gram Positive Cocci in Pairs and Chains    Growth in aerobic bottle: Gram Positive Cocci in Pairs and Chains  Final Report (09-02-23 @ 15:57):    Growth in aerobic and anaerobic bottles: Streptococcus mitis/oralis group    Alpha hemolytic streptoccous (Not Streptococcus pneumoniae or    Enterococcus)    isolated from a single blood culture set may represent contamination.    Contact the MicrobiologyDepartment at [phone] if susceptibility    testing is clinically indicated.    Direct identification is available within approximately 3-5    hours either by Blood Panel Multiplexed PCR or Direct    MALDI-TOF. Details: [email or web address]  Organism: Blood Culture PCR (09-02-23 @ 15:57)  Organism: Blood Culture PCR (09-02-23 @ 15:57)      Method Type: PCR      -  Streptococcus sp. (Not Grp A, B or S pneumoniae): Detec    Culture - Blood (collected 08-31-23 @ 13:15)  Source: .Blood Blood-Peripheral  Preliminary Report (09-04-23 @ 19:01):    No growth at 4 days        RADIOLOGY & ADDITIONAL TESTS: Personally reviewed.     Consultant(s) Notes Reviewed:  [x] YES  [ ] NO   Discussed with SW/JOEY, RN

## 2023-09-05 NOTE — PROVIDER CONTACT NOTE (CRITICAL VALUE NOTIFICATION) - NAME OF MD/NP/PA/DO NOTIFIED:
Zeeshan Hebert. MD
Dr. Jacob
MARQUITA Chacon
RN
Dr. Jacob
Dr. Montilla
MD Hebert, Zeeshan
MD Jacob and MARQUITA Vazquez
Dr. Jacob
Laverne Bauman
MARQUITA Chacon
SAVANNA SANDY
Dr. Snowden/ EVONNE Pace

## 2023-09-06 NOTE — PROGRESS NOTE ADULT - ASSESSMENT
MYRNA  Hypernatremia s/p hyponatremia  Hypokalemia/hyperkalemia   Hypothyroidism  FTT  Anemia   Acute respiratory failure   Bradycardia  Hyperphosphatemia     -MYRNA likely from hypoglycemic event, improved  -Suspected low solute in take, tea and toast physiology; sodium normalized  -Urine indices reviewed  -PRBCs per primary  -TSH elevated, check T4, could have also caused hyponatremia  -Deemed not to have capacity by psych; now intubated however  -Sodium stable; continue FW 300cc q6hrs  -Phos stable; no evidence of refeeding syndrome at present  -Hyperchloremic acidosis, mild - monitor for now  -Mir erinn 8; s/p replacement per ICU  -Vent management per ICU  -Consider Lokelma 5gm x 1

## 2023-09-06 NOTE — PROGRESS NOTE ADULT - REASON FOR ADMISSION
fall, pancytopenia

## 2023-09-06 NOTE — CHART NOTE - NSCHARTNOTESELECT_GEN_ALL_CORE
Death Note/Event Note
Event Note
Nutrition Services
Nutrition Services
POCUS/Event Note
Event Note
POCUS
POCUS/Event Note
RRT / Hypoglycemia/Event Note
Rapid Response

## 2023-09-06 NOTE — DISCHARGE NOTE FOR THE EXPIRED PATIENT - HOSPITAL COURSE
52 y/o F with anorexia, severe protein malnutrition, hypothyroidism, osteoporosis w/ multiple fractures, chronic hyponatremia, who presented with severe protein malnutrition with adult failure to thrive. Patient's hospital course was complicated by hypoglycemia, bradycardia, acute metabolic encephalopathy and acute hypoxic respiratory failure. Patient was intubated, extubated and required repeat intubation for hypoxia and inability to protect airway. Despite medical intervention, patient's clinical status did not improve. Goals of care discussions were had with the patient's family (sisters at bedside). Decision was made to palliatively extubate the patient on 23. Patient was palliatively extubated and  on 23 at 13:42.  53 year old female with history of anorexia, severe protein calorie malnutrition, hypothyroidism, osteoporosis with multiple fractures, and chronic hyponatremia presented with failure to thrive. Patient's hospital course was complicated by hypoglycemia, bradycardia, acute metabolic encephalopathy and acute hypoxic respiratory failure. Patient was intubated, extubated and required repeat intubation for hypoxia and inability to protect airway. Despite medical interventions, patient's clinical status did not improve. Goals of care discussions were had with the patient's family (sisters at bedside). Decision was made to palliatively extubate the patient on 23. Patient was palliatively extubated and  on 23 at 13:42.

## 2023-09-06 NOTE — PROGRESS NOTE ADULT - NUTRITIONAL ASSESSMENT
This patient has been assessed with a concern for Malnutrition and has been determined to have a diagnosis/diagnoses of Severe protein-calorie malnutrition and Underweight (BMI < 19).    This patient is being managed with:   Diet NPO with Tube Feed-  Tube Feeding Modality: Nasogastric  Vital 1.5 Alan  Total Volume for 24 Hours (mL): 800  Continuous  Starting Tube Feed Rate {mL per Hour}: 20  Increase Tube Feed Rate by (mL): 10     Every 24 hours  Until Goal Tube Feed Rate (mL per Hour): 40  Tube Feed Duration (in Hours): 20  Tube Feed Start Time: 10:00  Tube Feed Stop Time: 06:00  Free Water Flush Instructions:  150cc q 6hrs  Entered: Sep  3 2023 11:10AM  
This patient has been assessed with a concern for Malnutrition and has been determined to have a diagnosis/diagnoses of Severe protein-calorie malnutrition and Underweight (BMI < 19).    This patient is being managed with:   Diet NPO with Tube Feed-  Tube Feeding Modality: Nasogastric  Vital 1.5 Alan  Total Volume for 24 Hours (mL): 800  Continuous  Starting Tube Feed Rate {mL per Hour}: 20  Increase Tube Feed Rate by (mL): 10     Every 24 hours  Until Goal Tube Feed Rate (mL per Hour): 40  Tube Feed Duration (in Hours): 20  Tube Feed Start Time: 10:00  Tube Feed Stop Time: 06:00  Free Water Flush Instructions:  150cc q 6hrs  Entered: Sep  3 2023 11:10AM  
This patient has been assessed with a concern for Malnutrition and has been determined to have a diagnosis/diagnoses of Severe protein-calorie malnutrition and Underweight (BMI < 19).    This patient is being managed with:   Diet NPO with Tube Feed-  Tube Feeding Modality: Orogastric  Vital 1.5 Alan  Total Volume for 24 Hours (mL): 800  Continuous  Starting Tube Feed Rate {mL per Hour}: 10  Increase Tube Feed Rate by (mL): 10     Every 24 hours  Until Goal Tube Feed Rate (mL per Hour): 40  Tube Feed Duration (in Hours): 20  Tube Feed Start Time: 09:00  Tube Feed Stop Time: 05:00  Free Water Flush  Entered: Aug 29 2023  8:51AM  
This patient has been assessed with a concern for Malnutrition and has been determined to have a diagnosis/diagnoses of Severe protein-calorie malnutrition and Underweight (BMI < 19).    This patient is being managed with:   Diet NPO with Tube Feed-  Tube Feeding Modality: Orogastric  Nepro with Carb Steady  Total Volume for 24 Hours (mL): 200  Continuous  Starting Tube Feed Rate {mL per Hour}: 10  Increase Tube Feed Rate by (mL): 0     Every 24 hours  Until Goal Tube Feed Rate (mL per Hour): 10  Tube Feed Duration (in Hours): 20  Tube Feed Start Time: 10:00  Tube Feed Stop Time: 06:00  Free Water Flush  Entered: Sep  1 2023 11:22AM  
This patient has been assessed with a concern for Malnutrition and has been determined to have a diagnosis/diagnoses of Severe protein-calorie malnutrition and Underweight (BMI < 19).    This patient is being managed with:   Diet NPO with Tube Feed-  Tube Feeding Modality: Nasogastric  Vital 1.5 Alan  Total Volume for 24 Hours (mL): 800  Continuous  Starting Tube Feed Rate {mL per Hour}: 20  Increase Tube Feed Rate by (mL): 10     Every 24 hours  Until Goal Tube Feed Rate (mL per Hour): 40  Tube Feed Duration (in Hours): 20  Tube Feed Start Time: 10:00  Tube Feed Stop Time: 06:00  Free Water Flush Instructions:  150cc q 6hrs  Entered: Sep  3 2023 11:10AM  
This patient has been assessed with a concern for Malnutrition and has been determined to have a diagnosis/diagnoses of Severe protein-calorie malnutrition and Underweight (BMI < 19).    This patient is being managed with:   Diet NPO with Tube Feed-  Tube Feeding Modality: Nasogastric  Vital 1.5 Alan  Total Volume for 24 Hours (mL): 800  Continuous  Starting Tube Feed Rate {mL per Hour}: 20  Increase Tube Feed Rate by (mL): 10     Every 24 hours  Until Goal Tube Feed Rate (mL per Hour): 40  Tube Feed Duration (in Hours): 20  Tube Feed Start Time: 10:00  Tube Feed Stop Time: 06:00  Free Water Flush Instructions:  150cc q 6hrs  Entered: Sep  3 2023 11:10AM    Diet NPO with Tube Feed-  Tube Feeding Modality: Orogastric  Nepro with Carb Steady  Total Volume for 24 Hours (mL): 400  Continuous  Starting Tube Feed Rate {mL per Hour}: 20  Increase Tube Feed Rate by (mL): 0     Every 24 hours  Until Goal Tube Feed Rate (mL per Hour): 20  Tube Feed Duration (in Hours): 20  Tube Feed Start Time: 10:00  Tube Feed Stop Time: 06:00  Free Water Flush  Entered: Sep  2 2023  9:31AM    The following pending diet order is being considered for treatment of Severe protein-calorie malnutrition and Underweight (BMI < 19):null
This patient has been assessed with a concern for Malnutrition and has been determined to have a diagnosis/diagnoses of Severe protein-calorie malnutrition and Underweight (BMI < 19).    This patient is being managed with:   Diet NPO with Tube Feed-  Tube Feeding Modality: Orogastric  Nepro with Carb Steady  Total Volume for 24 Hours (mL): 400  Continuous  Starting Tube Feed Rate {mL per Hour}: 20  Increase Tube Feed Rate by (mL): 0     Every 24 hours  Until Goal Tube Feed Rate (mL per Hour): 20  Tube Feed Duration (in Hours): 20  Tube Feed Start Time: 10:00  Tube Feed Stop Time: 06:00  Free Water Flush  Entered: Sep  2 2023  9:31AM  
This patient has been assessed with a concern for Malnutrition and has been determined to have a diagnosis/diagnoses of Severe protein-calorie malnutrition and Underweight (BMI < 19).    This patient is being managed with:   Diet NPO with Tube Feed-  Tube Feeding Modality: Orogastric  Nepro with Carb Steady  Total Volume for 24 Hours (mL): 400  Continuous  Starting Tube Feed Rate {mL per Hour}: 20  Increase Tube Feed Rate by (mL): 0     Every 24 hours  Until Goal Tube Feed Rate (mL per Hour): 20  Tube Feed Duration (in Hours): 20  Tube Feed Start Time: 10:00  Tube Feed Stop Time: 06:00  Free Water Flush  Entered: Sep  2 2023  9:31AM  
This patient has been assessed with a concern for Malnutrition and has been determined to have a diagnosis/diagnoses of Severe protein-calorie malnutrition and Underweight (BMI < 19).    This patient is being managed with:   Diet NPO with Tube Feed-  Tube Feeding Modality: Orogastric  Vital 1.5 Alan  Total Volume for 24 Hours (mL): 800  Continuous  Starting Tube Feed Rate {mL per Hour}: 10  Increase Tube Feed Rate by (mL): 10     Every 24 hours  Until Goal Tube Feed Rate (mL per Hour): 40  Tube Feed Duration (in Hours): 20  Tube Feed Start Time: 09:00  Tube Feed Stop Time: 05:00  Free Water Flush  Entered: Aug 29 2023  8:51AM  
This patient has been assessed with a concern for Malnutrition and has been determined to have a diagnosis/diagnoses of Severe protein-calorie malnutrition and Underweight (BMI < 19).    This patient is being managed with:   Diet NPO with Tube Feed-  Tube Feeding Modality: Nasogastric  Vital 1.5 Alan  Total Volume for 24 Hours (mL): 800  Continuous  Starting Tube Feed Rate {mL per Hour}: 20  Increase Tube Feed Rate by (mL): 10     Every 24 hours  Until Goal Tube Feed Rate (mL per Hour): 40  Tube Feed Duration (in Hours): 20  Tube Feed Start Time: 10:00  Tube Feed Stop Time: 06:00  Free Water Flush Instructions:  150cc q 6hrs  Entered: Sep  3 2023 11:10AM  
This patient has been assessed with a concern for Malnutrition and has been determined to have a diagnosis/diagnoses of Severe protein-calorie malnutrition and Underweight (BMI < 19).    This patient is being managed with:   Diet NPO with Tube Feed-  Tube Feeding Modality: Nasogastric  Vital 1.5 Alan  Total Volume for 24 Hours (mL): 800  Continuous  Starting Tube Feed Rate {mL per Hour}: 20  Increase Tube Feed Rate by (mL): 10     Every 24 hours  Until Goal Tube Feed Rate (mL per Hour): 40  Tube Feed Duration (in Hours): 20  Tube Feed Start Time: 10:00  Tube Feed Stop Time: 06:00  Free Water Flush Instructions:  150cc q 6hrs  Entered: Sep  3 2023 11:10AM  
This patient has been assessed with a concern for Malnutrition and has been determined to have a diagnosis/diagnoses of Severe protein-calorie malnutrition and Underweight (BMI < 19).    This patient is being managed with:   Diet NPO with Tube Feed-  Tube Feeding Modality: Orogastric  Vital 1.5 Alan  Total Volume for 24 Hours (mL): 800  Continuous  Starting Tube Feed Rate {mL per Hour}: 10  Increase Tube Feed Rate by (mL): 10     Every 24 hours  Until Goal Tube Feed Rate (mL per Hour): 40  Tube Feed Duration (in Hours): 20  Tube Feed Start Time: 09:00  Tube Feed Stop Time: 05:00  Free Water Flush  Entered: Aug 29 2023  8:51AM  
This patient has been assessed with a concern for Malnutrition and has been determined to have a diagnosis/diagnoses of Severe protein-calorie malnutrition and Underweight (BMI < 19).    This patient is being managed with:   Diet NPO with Tube Feed-  Tube Feeding Modality: Orogastric  Nepro with Carb Steady  Total Volume for 24 Hours (mL): 200  Continuous  Starting Tube Feed Rate {mL per Hour}: 10  Increase Tube Feed Rate by (mL): 0     Every 24 hours  Until Goal Tube Feed Rate (mL per Hour): 10  Tube Feed Duration (in Hours): 20  Tube Feed Start Time: 10:00  Tube Feed Stop Time: 06:00  Free Water Flush  Entered: Sep  1 2023 11:22AM  
This patient has been assessed with a concern for Malnutrition and has been determined to have a diagnosis/diagnoses of Severe protein-calorie malnutrition and Underweight (BMI < 19).    This patient is being managed with:   Diet NPO with Tube Feed-  Tube Feeding Modality: Orogastric  Nepro with Carb Steady  Total Volume for 24 Hours (mL): 400  Continuous  Starting Tube Feed Rate {mL per Hour}: 20  Increase Tube Feed Rate by (mL): 0     Every 24 hours  Until Goal Tube Feed Rate (mL per Hour): 20  Tube Feed Duration (in Hours): 20  Tube Feed Start Time: 10:00  Tube Feed Stop Time: 06:00  Free Water Flush  Entered: Sep  2 2023  9:31AM  
This patient has been assessed with a concern for Malnutrition and has been determined to have a diagnosis/diagnoses of Severe protein-calorie malnutrition and Underweight (BMI < 19).    This patient is being managed with:   Diet NPO with Tube Feed-  Tube Feeding Modality: Orogastric  Vital 1.5 Alan  Total Volume for 24 Hours (mL): 800  Continuous  Starting Tube Feed Rate {mL per Hour}: 10  Increase Tube Feed Rate by (mL): 10     Every 24 hours  Until Goal Tube Feed Rate (mL per Hour): 40  Tube Feed Duration (in Hours): 20  Tube Feed Start Time: 09:00  Tube Feed Stop Time: 05:00  Free Water Flush  Entered: Aug 29 2023  8:51AM  
This patient has been assessed with a concern for Malnutrition and has been determined to have a diagnosis/diagnoses of Severe protein-calorie malnutrition and Underweight (BMI < 19).    This patient is being managed with:   Diet NPO with Tube Feed-  Tube Feeding Modality: Orogastric  Vital 1.5 Alan  Total Volume for 24 Hours (mL): 800  Continuous  Starting Tube Feed Rate {mL per Hour}: 10  Increase Tube Feed Rate by (mL): 10     Every 24 hours  Until Goal Tube Feed Rate (mL per Hour): 40  Tube Feed Duration (in Hours): 20  Tube Feed Start Time: 09:00  Tube Feed Stop Time: 05:00  Free Water Flush  Entered: Aug 29 2023  8:51AM  
This patient has been assessed with a concern for Malnutrition and has been determined to have a diagnosis/diagnoses of Severe protein-calorie malnutrition and Underweight (BMI < 19).    This patient is being managed with:   Diet NPO with Tube Feed-  Tube Feeding Modality: Orogastric  Nepro with Carb Steady  Total Volume for 24 Hours (mL): 200  Continuous  Starting Tube Feed Rate {mL per Hour}: 10  Increase Tube Feed Rate by (mL): 0     Every 24 hours  Until Goal Tube Feed Rate (mL per Hour): 10  Tube Feed Duration (in Hours): 20  Tube Feed Start Time: 10:00  Tube Feed Stop Time: 06:00  Free Water Flush  Entered: Sep  1 2023 11:22AM  
This patient has been assessed with a concern for Malnutrition and has been determined to have a diagnosis/diagnoses of Severe protein-calorie malnutrition and Underweight (BMI < 19).    This patient is being managed with:   Diet NPO with Tube Feed-  Tube Feeding Modality: Orogastric  Vital 1.5 Alan  Total Volume for 24 Hours (mL): 800  Continuous  Starting Tube Feed Rate {mL per Hour}: 10  Increase Tube Feed Rate by (mL): 10     Every 24 hours  Until Goal Tube Feed Rate (mL per Hour): 40  Tube Feed Duration (in Hours): 20  Tube Feed Start Time: 09:00  Tube Feed Stop Time: 05:00  Free Water Flush  Entered: Aug 29 2023  8:51AM  
This patient has been assessed with a concern for Malnutrition and has been determined to have a diagnosis/diagnoses of Severe protein-calorie malnutrition and Underweight (BMI < 19).    This patient is being managed with:   Diet NPO with Tube Feed-  Tube Feeding Modality: Orogastric  Vital 1.5 Alan  Total Volume for 24 Hours (mL): 800  Continuous  Starting Tube Feed Rate {mL per Hour}: 10  Increase Tube Feed Rate by (mL): 10     Every 24 hours  Until Goal Tube Feed Rate (mL per Hour): 40  Tube Feed Duration (in Hours): 20  Tube Feed Start Time: 09:00  Tube Feed Stop Time: 05:00  Free Water Flush  Entered: Aug 29 2023  8:51AM

## 2023-09-06 NOTE — PROGRESS NOTE ADULT - SUBJECTIVE AND OBJECTIVE BOX
[facility]  INFECTIOUS DISEASES   [address] [place] [address]  Tel: [phone]     Fax: [phone]  ========================================================  MD Roldan Delong Kaushal, MD Cho, Michelle, MD Sunjit, Jaspal, MD  ========================================================    [id number]  KYLE NAJERA     Follow up:  fall, pancytopenia and Acinetobacter Pneumonia?     No new changes, still intubated. No fever, Under warming blanket.   Sister at the bedside.     PAST MEDICAL & SURGICAL HISTORY:  Anemia  Depression  Chronic musculoskeletal pain  Anorexia  at age 15 yrs  No significant past surgical history    Social Hx: No smoking, EtOH or drugs     FAMILY HISTORY:  No pertinent family history in first degree relatives    Allergies  No Known Allergies    MEDICATIONS  (STANDING):  chlorhexidine 0.12% Liquid 15 milliLiter(s) Oral Mucosa every 12 hours  chlorhexidine 2% Cloths 1 Application(s) Topical <User Schedule>  dextrose 50% Injectable 25 Gram(s) IV Push once  dextrose 50% Injectable 25 Gram(s) IV Push once  dextrose 50% Injectable 12.5 Gram(s) IV Push once  dextrose Oral Gel 15 Gram(s) Oral once  glucagon  Injectable 1 milliGRAM(s) IntraMuscular once  levoFLOXacin IVPB      multivitamin/minerals/iron Oral Solution (CENTRUM) 15 milliLiter(s) Oral daily  thiamine IVPB 200 milliGRAM(s) IV Intermittent daily    REVIEW OF SYSTEMS:  Unable     Physical Exam:  Vital Signs Last 24 Hrs  T(C): 35.6 (06 Sep 2023 07:43), Max: 35.7 (05 Sep 2023 16:05)  T(F): 96.1 (06 Sep 2023 07:43), Max: 96.3 (05 Sep 2023 16:05)  HR: 84 (06 Sep 2023 10:00) (70 - 116)  RR: 24 (06 Sep 2023 10:00) (12 - 34)  SpO2: 97% (06 Sep 2023 10:00) (90% - 100%)  Parameters below as of 06 Sep 2023 08:00  Patient On (Oxygen Delivery Method): ventilator, AC22,300,5  O2 Concentration (%): 30  GEN: NAD, intubated, frail and cachectic   HEENT: normocephalic and atraumatic.   NECK: Supple.  No lymphadenopathy   LUNGS: Coarse breath sounds   HEART: Regular rate and rhythm   ABDOMEN: Soft, nondistended.  Positive bowel sounds.    EXTREMITIES: trace edema  NEUROLOGIC: unable  SKIN: No rash     Labs:                        7.8    2.45  )-----------( 21       ( 06 Sep 2023 05:50 )             25.2     09-06    145  |  119<H>  |  67<H>  ----------------------------<  171<H>  5.3   |  22  |  1.00    Ca    6.6<L>      06 Sep 2023 05:50  Phos  2.7     09-06  Mg     2.2     09-06    TPro  4.3<L>  /  Alb  1.5<L>  /  TBili  0.3  /  DBili  x   /  AST  35  /  ALT  73  /  AlkPhos  109  09-06    Culture - Blood (collected 09-03-23 @ 10:00)  Source: .Blood Blood-Peripheral    Culture - Blood (collected 09-03-23 @ 09:55)  Source: .Blood Blood-Peripheral    Culture - Sputum (collected 09-01-23 @ 09:19)  Source: ET Tube ET Tube  Gram Stain (09-01-23 @ 23:08):    Numerous polymorphonuclear leukocytes per low power field    No Squamous epithelial cells per low power field    Numerous Gram Negative Rods per oil power field    Numerous Gram positive cocci in pairs per oil power field    Few Gram Positive Rods per oil power field  Final Report (09-03-23 @ 18:55):    Mixed gram negative rods including    Moderate Stenotrophomonas maltophilia  Organism: Stenotrophomonas maltophilia (09-03-23 @ 18:55)  Organism: Stenotrophomonas maltophilia (09-03-23 @ 18:55)    Sensitivities:      -  Minocycline: S      Method Type:   Organism: Stenotrophomonas maltophilia (09-03-23 @ 18:55)    Sensitivities:      -  Levofloxacin: S <=0.5      Method Type: CHRISTIANO      -  Trimethoprim/Sulfamethoxazole: S <=0.5/9.5    Culture - Blood (collected 08-31-23 @ 13:20)  Source: .Blood Blood-Peripheral  Gram Stain (09-01-23 @ 12:53):    Growth in anaerobic bottle: Gram Positive Cocci in Pairs and Chains    Growth in aerobic bottle: Gram Positive Cocci in Pairs and Chains  Final Report (09-02-23 @ 15:57):    Growth in aerobic and anaerobic bottles: Streptococcus mitis/oralis group    Alpha hemolytic streptoccous (Not Streptococcus pneumoniae or    Enterococcus)    isolated from a single blood culture set may represent contamination.    Contact the MicrobiologyDepartment at [phone] if susceptibility    testing is clinically indicated.    Direct identification is available within approximately 3-5    hours either by Blood Panel Multiplexed PCR or Direct    MALDI-TOF. Details: [email or web address]  Organism: Blood Culture PCR (09-02-23 @ 15:57)  Organism: Blood Culture PCR (09-02-23 @ 15:57)    Sensitivities:      Method Type: PCR      -  Streptococcus sp. (Not Grp A, B or S pneumoniae): Detec    Culture - Blood (collected 08-31-23 @ 13:15)  Source: .Blood Blood-Peripheral  Final Report (09-05-23 @ 19:00):    No growth at 5 days    Culture - Urine (collected 08-27-23 @ 13:20)  Source: Clean Catch Clean Catch (Midstream)  Final Report (08-28-23 @ 18:35):    <10,000 CFU/mL Normal Urogenital Micki    WBC Count: 2.45 K/uL (09-06-23 @ 05:50)  WBC Count: 2.16 K/uL (09-05-23 @ 05:40)  WBC Count: 1.47 K/uL (09-04-23 @ 16:29)  WBC Count: 1.51 K/uL (09-04-23 @ 05:40)  WBC Count: 1.16 K/uL (09-03-23 @ 09:55)  WBC Count: 2.45 K/uL (09-02-23 @ 18:25)  WBC Count: 1.25 K/uL (09-02-23 @ 09:45)  WBC Count: 0.69 K/uL (09-02-23 @ 06:00)    Creatinine: 1.00 mg/dL (09-06-23 @ 05:50)  Creatinine: 0.93 mg/dL (09-05-23 @ 14:35)  Creatinine: 0.86 mg/dL (09-05-23 @ 05:40)  Creatinine: 0.78 mg/dL (09-04-23 @ 16:29)  Creatinine: 0.73 mg/dL (09-04-23 @ 05:40)  Creatinine: 0.69 mg/dL (09-03-23 @ 19:00)  Creatinine: 0.72 mg/dL (09-03-23 @ 09:55)  Creatinine: 0.70 mg/dL (09-02-23 @ 18:25)  Creatinine: 0.66 mg/dL (09-02-23 @ 09:45)  Creatinine: 0.69 mg/dL (09-02-23 @ 06:00)  Creatinine: 0.72 mg/dL (09-01-23 @ 22:00)  Creatinine: 0.71 mg/dL (09-01-23 @ 14:50)    Ferritin: 430 ng/mL (08-27-23 @ 12:20)     COVID-19 PCR: NotDetec (08-31-23 @ 11:40)    All imaging and other data have been reviewed.  < from: CT Chest w/ IV Cont (08.31.23 @ 15:10) >  IMPRESSION:  Small right pleural effusion and associated compressive atelectasis.  Small to moderate left-sided pleural effusion with extensive partial   atelectasis left lower lobe.  Patchy airspace consolidation left upper lobe with peribronchial   opacities right upper lobe, likely related to infection/pneumonia.  CT findings as discussed which are suggestive of hypoperfusion complex   (shock bowel).  No CT evidence for acute gastrointestinal bleeding.  Other findings as discussed above.    Assessment and Plan:   52yo woman with PMH of anorexia nervosa for more than 30yers, FTT with low BMP, hypothyroidism, osteoporosis, chronic hyponatremia was admitted on 8/27 after a fall, found to be pancytopenic.   She also had hypoglycemia. Patient's sister is in the bedside and reports that she has been getting worse for the last 6 months and has become increasingly more frail. Patient lives alone with aides.  On admission no fever, chills, chest pain, palpitations, SOB, cough, abdominal pain, nausea, vomiting, diarrhea, constipation, urinary frequency, urgency, or dysuria, headaches.  On 8/28 became hypoglycemic and unresponsive, so was intubated and was transferred to MICU. On 8/30 extubated but reintubated on 8/31 again.   Since then has worsening of pancytopenia and also, has developed pneumonia. ET tube culture showed Stenotrophomonas.     Even though her pancytopenia could be related to poor nutrition but since she used to go out for daily walkings as per sister, will rule out tick borne diseases, no rash or bug bites as far as sister knows.   She also had strep bacteremia that could be real infection, so will rule out endocarditis causing pancytopenia. No fever but she doesn;t have a normal immune system to have normal immune response.   ET culture with Stenotrophomonas could be colonization in tube or real, since she is very sick will start her on Levaquin. Bactrim is the drug of choice but will cause more bone marrow suppression.   Even though cQT is prolonged but since she is intubated on monitor, levaquin would be a better choice, for any reason unable to continue levaquin, can start Minocycline.   Family is thinking about possible palliative extubation.     # Malnutrition   # Pancytopenia  # Respiratory failue  # Strep Bacteremia   # Stenotrophomonas pneumonia     - Repeat blood cultures x 2  NGTD   - Will follow tick PCR and serology  - Monitor CBC and ECG for cQT  - Continue Levaquin 750mg daily (covers acinetobacter pneumonia and strep bacteremia)   - Can treat about 7days depending in family decision about palliative extubation   - TTE negative for vegetations     Will follow.    Adelia Peralta MD  Division of Infectious Diseases   Please call ID service at [phone] with any question.    35 minutes spent on total encounter assessing patient, examination, chart review, counseling and coordinating care by the attending physician/nurse/care manager.

## 2023-09-06 NOTE — PROGRESS NOTE ADULT - PROVIDER SPECIALTY LIST ADULT
Critical Care
Hospitalist
Hospitalist
Infectious Disease
Nephrology
Nephrology
Critical Care
Hospitalist
Infectious Disease
Nephrology
Critical Care
Infectious Disease
Nephrology
Nephrology
Critical Care
Nephrology
Critical Care
Hospitalist
Critical Care
Hospitalist

## 2023-09-06 NOTE — PROGRESS NOTE ADULT - SUBJECTIVE AND OBJECTIVE BOX
Interval Events:  Remains intubated  Non-responsive off sedation    REVIEW OF SYSTEMS:  [ ] All other systems negative  [x] Unable to assess ROS due to poor mentation    OBJECTIVE:    ICU Vital Signs Last 24 Hrs  T(C): 35.6 (06 Sep 2023 07:43), Max: 35.7 (05 Sep 2023 16:05)  T(F): 96.1 (06 Sep 2023 07:43), Max: 96.3 (05 Sep 2023 16:05)  HR: 84 (06 Sep 2023 10:00) (70 - 116)  BP: --  BP(mean): --  ABP: 108/71 (06 Sep 2023 10:00) (60/37 - 156/96)  ABP(mean): 85 (06 Sep 2023 10:00) (48 - 121)  RR: 24 (06 Sep 2023 10:00) (12 - 34)  SpO2: 97% (06 Sep 2023 10:00) (90% - 100%)    O2 Parameters below as of 06 Sep 2023 08:00  Patient On (Oxygen Delivery Method): ventilator, AC22,300,5    O2 Concentration (%): 30      I&O's Detail    05 Sep 2023 07:01  -  06 Sep 2023 07:00  --------------------------------------------------------  IN:    Enteral Tube Flush: 600 mL    IV PiggyBack: 250 mL    IV PiggyBack: 102 mL    Nepro with Carb Steady: 840 mL    Norepinephrine: 194.9 mL  Total IN: 1986.9 mL    OUT:    Indwelling Catheter - Urethral (mL): 170 mL    Rectal Tube (mL): 50 mL  Total OUT: 220 mL    Total NET: 1766.9 mL                          7.8    2.45  )-----------( 21       ( 06 Sep 2023 05:50 )             25.2       09-06    145  |  119<H>  |  67<H>  ----------------------------<  171<H>  5.3   |  22  |  1.00    Ca    6.6<L>      06 Sep 2023 05:50  Phos  2.7     09-06  Mg     2.2     09-06    TPro  4.3<L>  /  Alb  1.5<L>  /  TBili  0.3  /  DBili  x   /  AST  35  /  ALT  73  /  AlkPhos  109  09-06          ABG - ( 05 Sep 2023 21:00 )  pH, Arterial: 7.35  pH, Blood: x     /  pCO2: 33    /  pO2: 97    / HCO3: 18    / Base Excess: -7.6  /  SaO2: 97.0          PT/INR - ( 06 Sep 2023 05:50 )   PT: 10.4 sec;   INR: 0.89 ratio         PTT - ( 06 Sep 2023 05:50 )  PTT:29.1 sec        PHYSICAL EXAM:  General: Intubated, sedated, cachectic  HEENT: NC/AT   Neck: Supple  I&O's Detail  Respiratory: CTAB. No wheezing.  Cardiovascular: RRR. No murmur. No edema  Abdomen: Soft, nondistended  Extremities: Warm  Neurological: A&Ox0              HOSPITAL MEDICATIONS:    levoFLOXacin IVPB          dextrose 50% Injectable 25 Gram(s) IV Push once  dextrose 50% Injectable 25 Gram(s) IV Push once  dextrose 50% Injectable 12.5 Gram(s) IV Push once  dextrose Oral Gel 15 Gram(s) Oral once  glucagon  Injectable 1 milliGRAM(s) IntraMuscular once      multivitamin/minerals/iron Oral Solution (CENTRUM) 15 milliLiter(s) Oral daily  thiamine IVPB 200 milliGRAM(s) IV Intermittent daily      chlorhexidine 0.12% Liquid 15 milliLiter(s) Oral Mucosa every 12 hours  chlorhexidine 2% Cloths 1 Application(s) Topical <User Schedule>        53 year old female with anorexia, severe malnutrition, hypothyroidism, osteoporosis with multiple fractures, and chronic hyponatremia, who presents with failure to thrive. Course complicated by hypoglycemia, bradycardia, encephalopathy, and hypoxia requiring multiple intubations.     Acute metabolic encephalopathy  Acute hypoxemic respiratory failure  Septic shock  Stenotrophomonas pneumonia  Anorexia   Severe protein-calorie malnutrition  Pancytopenia  Electrolyte derangements  Acute kidney injury  Distal radius fx    NEURO: Acute metabolic encephalopathy. CTH 9/4 negative for bleed. Continue to hold sedation.  PULM: Reintubated 8/31 for hypoxic respiratory failure. Continue vent support given poor mentation. Needed to resume full vent support.   CVS: Septic shock now once again requiring vasopressors; no additional pressor support. TTE without wall deficits.   GI: Continue trickle feeds via NGT  RENAL: Electrolyte derangements improving, continue to monitor and supplement as needed. Free water flushes for hypernatremia (300ml q 6 hours).  Nutrition goals to increase 10cc/hr until 40cc/hr per 20h.   HEME: Pancytopenia, suspect bone marrow suppression from severe malnutrition.  ENDO: Monitor fingersticks  ID: Sputum Cx with stenotrophomonas. One set of BCx with strep mitis. Follow up repeat BCx. Continue levofloxacin. Tick panel pending.  MSK: Distal radius fx s/p splint  CODE: Full code; to discuss goals of care with family considering no neuro improvement despite no sedation; family considering terminal palliative management.    SKIN: Multiple pressure ulcers and skin tears  LINES: ETT, NGT; L axillary a line; IJ.     Prognosis guarded    Family updated at bedside

## 2023-09-06 NOTE — CHART NOTE - NSCHARTNOTEFT_GEN_A_CORE
Called to evaluate the patient for cardiopulmonary arrest after palliative extubation.     On physical exam patient did not respond to verbal or physical stimuli. No spontaneous respirations.  Absent heart and breath sounds. Absent radial, femoral, and carotid pulses.  Pupils are fixed and dilated absent YUDI, no corneal reflex.  EKG rhythm strip shows aystole. Patient pronounced dead at 1342. Attending notified.  Family notified at bedside.

## 2023-09-06 NOTE — PROGRESS NOTE ADULT - ATTENDING COMMENTS
53 year old female with anorexia, severe malnutrition, hypothyroidism, osteoporosis with multiple fractures, and chronic hyponatremia who presented with failure to thrive. Course complicated by hypoglycemia, bradycardia, encephalopathy, and hypoxia requiring multiple intubations.     Problems  Acute metabolic encephalopathy  Acute hypoxemic respiratory failure  Septic shock  Stenotrophomonas pneumonia  Anorexia   Severe protein-calorie malnutrition  Pancytopenia  Electrolyte derangements  Acute kidney injury  Distal radius fx    Despite treatment patient remains encephalopathic and is unable to tolerate pressure support. Given lack of clinical improvement and overall poor prognosis decision made by patient's family to pursue comfort measures. Patient had previously been made DNR yesterday. I discussed palliative measures with patient's sisters at bedside. Decision made to electively extubate. Vasopressors were discontinued. Patient passed away shortly thereafter with family at bedside.
53 year old female with anorexia, severe malnutrition, hypothyroidism, osteoporosis with multiple fractures, and chronic hyponatremia, who presents with failure to thrive. Course complicated by hypoglycemia, bradycardia, encephalopathy, and hypoxia requiring multiple intubations.     Acute metabolic encephalopathy  Acute hypoxemic respiratory failure  Septic shock  Stenotrophomonas pneumonia  Anorexia   Severe protein-calorie malnutrition  Pancytopenia  Electrolyte derangements  Acute kidney injury  Distal radius fx    NEURO: Acute metabolic encephalopathy. CTH 9/4 negative for bleed. Continue to hold sedation.  PULM: Reintubated 8/31 for hypoxic respiratory failure. Continue vent support given poor mentation. Keep on full support. Did not tolerate PST today as she developed hypercapnia.  CVS: Septic shock requiring vasopressors. Vasopressors restarted today. Keep MAP > 65  GI: Continue trickle feeds via NGT  RENAL: Electrolyte derangements present. Continue to monitor and supplement as needed. Free water flushes for hypernatremia  HEME: Pancytopenia, suspect bone marrow suppression from severe malnutrition.  ENDO: Monitor fingersticks  ID: Sputum Cx with stenotrophomonas. One set of BCx with strep mitis. Follow up repeat BCx. Continue levofloxacin. Tick panel pending.  MSK: Distal radius fx s/p splint  CODE: Full code.  SKIN: Multiple pressure ulcers and skin tears  LINES: ETT, NGT; L axillary a line    Prognosis guarded    Family updated. GOC discussion ongoing.
53F with anorexia, severe malnutrition, hypothyroidism, osteoporosis with multiple fractures, chronic hyponatremia, who presents with failure to thrive. Course complicated by hypoglycemia, bradycardia and hypoxia s/p intubation. Pt now with worsening encephalopathy, septic shock, and hypoxic respiratory failure requiring reintubation on 8/31    Problems:   Metabolic encephalopathy  Acute hypoxic respiratory failure  septic shock  Stenotrophomonas PNA  anorexia with severe malnutrition  pancytopenia  electrolyte derangements   MYRNA  distal radius fx    Neuro: Pt seen by psych during ED, deemed to not have capacity to leave AMA. Now with metabolic encephalopathy 2/2 electrolyte derangements.   Pulm: Acute hypoxic respiratory failure 2/2 unresponsiveness from hypoglycemia, s/p extubation with reintubation 8/31 for worsening hypoxic respiratory failure. Wean as tolerated  CV: Septic shock requiring vasopressors. Titrate to MAP >65. Continue IVF for hypovolemia  Skin/Lines: Multiple pressure ulcers and skin tears; ETT, NGT; L axillary a line  GI: s/p NGT placement, start trickle feeds  /Renal: Electrolyte derangements improving, continue to monitor and replete prn; likely MYRNA today, monitor UOP. Start free water flushes for hypernatremia  Heme/DVT PPX: Pancytopenia likely 2/2 bone marrow suppression from severe malnutrition. SCDs for DVT ppx  Endo: hypoglycemia 2/2 poor intake now with hyperglycemia which is improving; trend FS, will tolerate hyperglycemia to 200s-300s to minimize risk of refeeding syndrome; thyroid levels consistent with subclinical hypothyroidism, will hold off on treatment at this time  ID: SpCx with stenotrophomonas. One set of BCx with strep mitis, unclear if contaminant. Central line d/c. Check repeat BCx. Change abx to levaquin. Check TTE to evaluate for endocarditis.  MSK: distal radius fx s/p splint  Ethics: Full code. Left message for sister, Gracia
53F with anorexia, severe malnutrition, hypothyroidism, osteoporosis with multiple fractures, chronic hyponatremia, who presents with failure to thrive. Course complicated by hypoglycemia, bradycardia and hypoxia s/p intubation.     Problems:   Acute hypoxic respiratory failure  anorexia with severe malnutrition  pancytopenia  hypokalemia   hyponatremia  hypoglycemia     Neuro: Pt seen by psych during ED, deemed to not have capacity to leave AMA. will reconsult psych when extubated  Pulm: Acute hypoxic respiratory failure 2/2 unresponsiveness from hypoglycemia, s/p intubation. Trend oxygenation. Wean as tolerated  CV: Bradycardia likely 2/2 hypoxia, now improved. Continue tele monitoring  Skin/Lines: Multiple pressure ulcers and skin tears; PIV; ETT (8/28-)  GI: OGT in place; will hold enteral feeds until electrolyte abnormalities corrected  /Renal: Hypokalemia and hyponatremia 2/2 malnutrition; correct potassium and phos prior to feeding; at high risk for refeeding syndrome; hyperchloremic metabolic acidosis; check urine lytes  Heme/DVT PPX: HSQ given severely low BMI  Endo: hypoglycemia 2/2 poor intake now with hyperglycemia which is improving; trend FS and if persistent can start insulin though pt is as risk for severe hypokalemia; elevated TSH with normal T4, continue levothyroxine  ID: Observe off of abx  Ethics: Full code. Plan d/w sister at bedside. Prognosis guarded.
53F with anorexia, severe malnutrition, hypothyroidism, osteoporosis with multiple fractures, chronic hyponatremia, who presents with failure to thrive. Course complicated by hypoglycemia, bradycardia and hypoxia s/p intubation. Pt now with worsening encephalopathy, septic shock, and hypoxic respiratory failure requiring reintubation on 8/31    Problems:   Metabolic encephalopathy  Acute hypoxic respiratory failure  septic shock  Aspiration PAN  anorexia with severe malnutrition  pancytopenia  electrolyte derangements   MYRNA    Neuro: Pt seen by psych during ED, deemed to not have capacity to leave AMA. Now with metabolic encephalopathy 2/2 electrolyte derangements.   Pulm: Acute hypoxic respiratory failure 2/2 unresponsiveness from hypoglycemia, s/p extubation with reintubation 8/31 for worsening hypoxic respiratory failure. Wean as tolerated  CV: Septic shock requiring vasopressors. Titrate to MAP >65. Continue IVF for hypovolemia  Skin/Lines: Multiple pressure ulcers and skin tears; L femoral central line (8/31), L femoral A line (8/31), ETT, NGT  GI: s/p NGT placement, start trickle feeds  /Renal: Electrolyte derangements improving, continue to monitor and replete prn; likely MYRNA today, monitor UOP. Start free water flushes for hypernatremia  Heme/DVT PPX: Pancytopenia likely 2/2 bone marrow suppression from severe malnutrition. SCDs for DVT ppx  Endo: hypoglycemia 2/2 poor intake now with hyperglycemia which is improving; trend FS, will tolerate hyperglycemia to 200s-300s to minimize risk of refeeding syndrome; thyroid levels consistent with subclinical hypothyroidism, will hold off on treatment at this time  ID: Continue cefepime. Blood cultures with strep, follow up species and sensitivities. Check sputum culture. Repeat BCx  Ethics: Full code. Plan d/w sister at bedside. Prognosis guarded.
53F with anorexia, severe malnutrition, hypothyroidism, osteoporosis with multiple fractures, chronic hyponatremia, who presents with failure to thrive. Course complicated by hypoglycemia, bradycardia and hypoxia s/p intubation.     Problems:   Acute hypoxic respiratory failure  anorexia with severe malnutrition  pancytopenia  hypokalemia   hyponatremia  hypoglycemia     Neuro: Pt seen by psych during ED, deemed to not have capacity to leave AMA. will reconsult psych when extubated  Pulm: Acute hypoxic respiratory failure 2/2 unresponsiveness from hypoglycemia, s/p intubation. Trend oxygenation. Wean as tolerated  CV: Bradycardia likely 2/2 hypoxia, now improved. Continue tele monitoring  Skin/Lines: Multiple pressure ulcers and skin tears; PIV; ETT (8/28-)  GI: OGT in place; will hold enteral feeds until electrolyte abnormalities corrected  /Renal: Hypokalemia and hyponatremia 2/2 malnutrition; correct potassium and phos prior to feeding; at high risk for refeeding syndrome, check labs frequently   Heme/DVT PPX: HSQ given severely low BMI  Endo: hypoglycemia 2/2 poor intake now with hyperglycemia which is improving; trend FS, will tolerate hyperglycemia to 200s-300s to minimize risk of refeeding syndrome; thyroid levels consistent with subclinical hypothyroidism, will hold off on treatment at this time  ID: Observe off of abx  Ethics: Full code. Plan d/w sister at bedside. Prognosis guarded.
53F with anorexia, severe malnutrition, hypothyroidism, osteoporosis with multiple fractures, chronic hyponatremia, who presents with failure to thrive. Course complicated by hypoglycemia, bradycardia and hypoxia s/p intubation. Pt now with worsening encephalopathy, septic shock, and hypoxic respiratory failure requiring reintubation on 8/31    Problems:   Metabolic encephalopathy  Acute hypoxic respiratory failure  septic shock  Aspiration PAN  anorexia with severe malnutrition  pancytopenia  hypokalemia   hyponatremia  hypoglycemia     Neuro: Pt seen by psych during ED, deemed to not have capacity to leave AMA. Now with metabolic encephalopathy 2/2 electrolyte derangements.   Pulm: Acute hypoxic respiratory failure 2/2 unresponsiveness from hypoglycemia, s/p extubation with reintubation 8/31 for worsening hypoxic respiratory failure. Wean as tolerated  CV: Septic shock requiring vasopressors. Titrate to MAP >65. Continue IVF for hypovolemia  Skin/Lines: Multiple pressure ulcers and skin tears; L femoral central line (8/31), L femoral A line (8/31), ETT, NGT  GI: s/p NGT placement, hold trickle feeds while in septic shock  /Renal: severe hyperkalemia 2/2 lab contamination from supplementation, mild hyperkalemia 2/2 MYRNA, trend BMP, continue IVF  Heme/DVT PPX: 1U pRBC today for Hb 6.7, no clear source of blood loss. SCDs for DVT ppx  Endo: hypoglycemia 2/2 poor intake now with hyperglycemia which is improving; trend FS, will tolerate hyperglycemia to 200s-300s to minimize risk of refeeding syndrome; thyroid levels consistent with subclinical hypothyroidism, will hold off on treatment at this time  ID: Vanc and cefepime started today for septic shock likely 2/2 PNA. Check blood and sputum cultures. Check UA  Ethics: Full code. Plan d/w sister at bedside. Prognosis guarded.
53F with anorexia, severe malnutrition, hypothyroidism, osteoporosis with multiple fractures, chronic hyponatremia, who presents with failure to thrive. Course complicated by hypoglycemia, bradycardia and hypoxia s/p intubation. Pt now with worsening encephalopathy, septic shock, and hypoxic respiratory failure requiring reintubation on 8/31    Problems:   Metabolic encephalopathy  Acute hypoxic respiratory failure  septic shock  Aspiration PAN  anorexia with severe malnutrition  pancytopenia  hypokalemia   hyponatremia  hypoglycemia   MYRNA    Neuro: Pt seen by psych during ED, deemed to not have capacity to leave AMA. Now with metabolic encephalopathy 2/2 electrolyte derangements.   Pulm: Acute hypoxic respiratory failure 2/2 unresponsiveness from hypoglycemia, s/p extubation with reintubation 8/31 for worsening hypoxic respiratory failure. Wean as tolerated  CV: Septic shock requiring vasopressors. Titrate to MAP >65. Continue IVF for hypovolemia  Skin/Lines: Multiple pressure ulcers and skin tears; L femoral central line (8/31), L femoral A line (8/31), ETT, NGT  GI: s/p NGT placement, start trickle feeds  /Renal: Electrolyte derangements improving, continue to monitor and replete prn; likely MYRNA today, monitor UOP  Heme/DVT PPX: Hb stable after transfusion. SCDs for DVT ppx  Endo: hypoglycemia 2/2 poor intake now with hyperglycemia which is improving; trend FS, will tolerate hyperglycemia to 200s-300s to minimize risk of refeeding syndrome; thyroid levels consistent with subclinical hypothyroidism, will hold off on treatment at this time  ID: Continue cefepime. Blood cultures with strep, follow up species and sensitivities. Check sputum culture  Ethics: Full code. Plan d/w sister at bedside. Prognosis guarded.
53F with anorexia, severe malnutrition, hypothyroidism, osteoporosis with multiple fractures, chronic hyponatremia, who presents with failure to thrive. Course complicated by hypoglycemia, bradycardia and hypoxia s/p intubation.     Problems:   Acute hypoxic respiratory failure  anorexia with severe malnutrition  pancytopenia  hypokalemia   hyponatremia  hypoglycemia     Neuro: Pt seen by psych during ED, deemed to not have capacity to leave AMA. Will consult psych when mental status improves  Pulm: Acute hypoxic respiratory failure 2/2 unresponsiveness from hypoglycemia, s/p extubation, wean oxygen as tolerated  CV: Bradycardia likely 2/2 hypoxia, now improved. Continue tele monitoring  Skin/Lines: Multiple pressure ulcers and skin tears; PIV; NGT  GI: s/p NGT placement, continue trickle feeds  /Renal: Hypokalemia and hyponatremia 2/2 malnutrition; correct potassium and phos prior to feeding; at high risk for refeeding syndrome, check labs frequently   Heme/DVT PPX: HSQ given severely low BMI  Endo: hypoglycemia 2/2 poor intake now with hyperglycemia which is improving; trend FS, will tolerate hyperglycemia to 200s-300s to minimize risk of refeeding syndrome; thyroid levels consistent with subclinical hypothyroidism, will hold off on treatment at this time  ID: Observe off of abx  Ethics: Full code. Plan d/w sister at bedside. Prognosis guarded.

## 2023-09-06 NOTE — PROGRESS NOTE ADULT - SUBJECTIVE AND OBJECTIVE BOX
Patient is a 53y old  Female who presents with a chief complaint of fall, pancytopenia (27 Aug 2023 17:14)       HPI:  52yo F with PMH anemia, hypothyroidism, osteoporosis, chronic hyponatremia, depression, anxiety presents to ED on 8/27/23 due to weakness and hypoglycemia.  Patient was seen at this emergency room 8/24 ago for a fall at physical therapy 8/23.  Was diagnosed with left wrist fracture at that time.  Splint was applied and seen by orthopedics.  Recommended admission due to fall risk and unsafe discharge.  Patient refused admission and left AMA.  Patient was fully aware of  fall risk and all lab abnormalities.  States she wanted to follow-up outpatient.  Today patient became weak and aide assisted her down to the floor.  Denies hitting head or any injuries from the fall because aide assisted her.  EMS was called.  Sugar on arrival per EMS was 48 and given D10.  Sugar on arrival to emergency room was 171.  Patient denies any pain or complaints.  Denies any injuries from the fall.  At this time patient is refusing any treatment or test.  States she just wants to go home and eat.  States her sugar was low because she did not eat today but she says she will eat when she gets home. Patient removed splint that was placed 2 days ago and was not wearing splint today per EMS. Patient's sister reports that she has been getting worse for the last 6 months and has become increasingly more frail. Patient lives alone with aides as per sister. Patient has ritualistic eating habits and a diet which includes eggs, fish, and vegetables. She currently takes no medications.   Renal consulted for multiple electrolyte abnormalities.  She states she wants to urinate.  Denies any N/V/SOB.  Eating. States she had edema.     No acute events noted  Intubated       PAST MEDICAL & SURGICAL HISTORY:  Anemia      Depression      Chronic musculoskeletal pain      Anorexia  at age 15 yrs      No significant past surgical history           FAMILY HISTORY:  No pertinent family history in first degree relatives    [place]    Social History:Non smoker    MEDICATIONS  (STANDING):  chlorhexidine 0.12% Liquid 15 milliLiter(s) Oral Mucosa every 12 hours  chlorhexidine 2% Cloths 1 Application(s) Topical <User Schedule>  dextrose 50% Injectable 25 Gram(s) IV Push once  dextrose 50% Injectable 25 Gram(s) IV Push once  dextrose 50% Injectable 12.5 Gram(s) IV Push once  dextrose Oral Gel 15 Gram(s) Oral once  glucagon  Injectable 1 milliGRAM(s) IntraMuscular once  levoFLOXacin IVPB 750 milliGRAM(s) IV Intermittent every 48 hours  levoFLOXacin IVPB      multivitamin/minerals/iron Oral Solution (CENTRUM) 15 milliLiter(s) Oral daily  thiamine IVPB 200 milliGRAM(s) IV Intermittent daily    MEDICATIONS  (PRN):      Allergies    No Known Allergies    Intolerances         REVIEW OF SYSTEMS:  Intubated    ICU Vital Signs Last 24 Hrs  T(C): 35.6 (06 Sep 2023 07:43), Max: 35.7 (05 Sep 2023 16:05)  T(F): 96.1 (06 Sep 2023 07:43), Max: 96.3 (05 Sep 2023 16:05)  HR: 82 (06 Sep 2023 08:30) (70 - 116)  BP: --  BP(mean): --  ABP: 101/68 (06 Sep 2023 08:30) (60/37 - 156/96)  ABP(mean): 81 (06 Sep 2023 08:30) (48 - 121)  RR: 24 (06 Sep 2023 08:30) (12 - 40)  SpO2: 97% (06 Sep 2023 08:30) (90% - 100%)    O2 Parameters below as of 06 Sep 2023 08:00  Patient On (Oxygen Delivery Method): ventilator, AC22,300,5    O2 Concentration (%): 30            PHYSICAL EXAM:    GENERAL: cachectic, frail appearing; intubated  HEAD:  Atraumatic, Normocephalic  NERVOUS SYSTEM:  sedated  CHEST/LUNG: Clear to auscultation bilaterally  HEART: Regular rate and rhythm  EXTREMITIES:  No Edema  SKIN: + Ecchymosis LE      LABS:                                                      7.8    2.45  )-----------( 21       ( 06 Sep 2023 05:50 )             25.2     09-06    145  |  119<H>  |  67<H>  ----------------------------<  171<H>  5.3   |  22  |  1.00    Ca    6.6<L>      06 Sep 2023 05:50  Phos  2.7     09-06  Mg     2.2     09-06    TPro  4.3<L>  /  Alb  1.5<L>  /  TBili  0.3  /  DBili  x   /  AST  35  /  ALT  73  /  AlkPhos  109  09-06    PT/INR - ( 06 Sep 2023 05:50 )   PT: 10.4 sec;   INR: 0.89 ratio         PTT - ( 06 Sep 2023 05:50 )  PTT:29.1 sec  Urinalysis Basic - ( 06 Sep 2023 05:50 )    Color: x / Appearance: x / SG: x / pH: x  Gluc: 171 mg/dL / Ketone: x  / Bili: x / Urobili: x   Blood: x / Protein: x / Nitrite: x   Leuk Esterase: x / RBC: x / WBC x   Sq Epi: x / Non Sq Epi: x / Bacteria: x        ABG - ( 05 Sep 2023 21:00 )  pH, Arterial: 7.35  pH, Blood: x     /  pCO2: 33    /  pO2: 97    / HCO3: 18    / Base Excess: -7.6  /  SaO2: 97.0

## 2023-09-06 NOTE — DISCHARGE NOTE FOR THE EXPIRED PATIENT - SECONDARY DIAGNOSIS.
Hypothyroidism Pancytopenia Electrolyte imbalance Left wrist fracture Acute respiratory failure with hypoxia Septic shock Acute metabolic encephalopathy Anorexia Pneumonia

## 2023-09-08 LAB
A PHAGOCYTOPH DNA BLD QL NAA+PROBE: NEGATIVE — SIGNIFICANT CHANGE UP
A PHAGOCYTOPH IGG TITR SER IF: SIGNIFICANT CHANGE UP TITER
B BURGDOR AB SER QL IA: NEGATIVE — SIGNIFICANT CHANGE UP
B MICROTI DNA BLD QL NAA+PROBE: NEGATIVE — SIGNIFICANT CHANGE UP
B MICROTI IGG TITR SER: SIGNIFICANT CHANGE UP TITER
B MIYAMOTOI GLPQ BLD QL NAA+NON-PROBE: NEGATIVE — SIGNIFICANT CHANGE UP
BABESIA DNA SPEC QL NAA+PROBE: NEGATIVE — SIGNIFICANT CHANGE UP
BABESIA DNA SPEC QL NAA+PROBE: NEGATIVE — SIGNIFICANT CHANGE UP
CULTURE RESULTS: SIGNIFICANT CHANGE UP
CULTURE RESULTS: SIGNIFICANT CHANGE UP
E CHAFFEENSIS DNA BLD QL NAA+PROBE: NEGATIVE — SIGNIFICANT CHANGE UP
E CHAFFEENSIS IGG TITR SER IF: SIGNIFICANT CHANGE UP TITER
E EWINGII DNA SPEC QL NAA+PROBE: NEGATIVE — SIGNIFICANT CHANGE UP
EHRLICHIA DNA SPEC QL NAA+PROBE: NEGATIVE — SIGNIFICANT CHANGE UP
SPECIMEN SOURCE: SIGNIFICANT CHANGE UP
SPECIMEN SOURCE: SIGNIFICANT CHANGE UP

## 2023-09-14 LAB
SAO2 % BLDA: SIGNIFICANT CHANGE UP % (ref 94–98)
SAO2 % BLDA: SIGNIFICANT CHANGE UP % (ref 94–98)
